# Patient Record
Sex: MALE | Race: WHITE | Employment: OTHER | URBAN - METROPOLITAN AREA
[De-identification: names, ages, dates, MRNs, and addresses within clinical notes are randomized per-mention and may not be internally consistent; named-entity substitution may affect disease eponyms.]

---

## 2018-05-01 ENCOUNTER — HOSPITAL ENCOUNTER (EMERGENCY)
Age: 68
Discharge: HOME | End: 2018-05-01
Attending: EMERGENCY MEDICINE
Payer: MEDICARE

## 2018-05-01 ENCOUNTER — APPOINTMENT (OUTPATIENT)
Dept: GENERAL RADIOLOGY | Age: 68
End: 2018-05-01
Attending: EMERGENCY MEDICINE
Payer: MEDICARE

## 2018-05-01 ENCOUNTER — HOSPITAL ENCOUNTER (EMERGENCY)
Age: 68
Discharge: STILL ADMITTED OR ADMINISTRATIVE | End: 2018-05-01
Attending: EMERGENCY MEDICINE

## 2018-05-01 VITALS
WEIGHT: 257 LBS | SYSTOLIC BLOOD PRESSURE: 110 MMHG | RESPIRATION RATE: 13 BRPM | HEART RATE: 75 BPM | OXYGEN SATURATION: 95 % | TEMPERATURE: 98 F | HEIGHT: 70 IN | DIASTOLIC BLOOD PRESSURE: 99 MMHG | BODY MASS INDEX: 36.79 KG/M2

## 2018-05-01 DIAGNOSIS — R07.9 CHEST PAIN, UNSPECIFIED TYPE: Primary | ICD-10-CM

## 2018-05-01 DIAGNOSIS — I48.0 PAROXYSMAL ATRIAL FIBRILLATION (HCC): ICD-10-CM

## 2018-05-01 LAB
ANION GAP SERPL CALC-SCNC: 12 MMOL/L (ref 5–15)
ATRIAL RATE: 109 BPM
ATRIAL RATE: 71 BPM
BASOPHILS # BLD: 0 K/UL (ref 0–0.1)
BASOPHILS NFR BLD: 0 % (ref 0–1)
BUN SERPL-MCNC: 19 MG/DL (ref 6–20)
BUN/CREAT SERPL: 14 (ref 12–20)
CALCIUM SERPL-MCNC: 8.8 MG/DL (ref 8.5–10.1)
CALCULATED P AXIS, ECG09: 37 DEGREES
CALCULATED R AXIS, ECG10: 30 DEGREES
CALCULATED R AXIS, ECG10: 42 DEGREES
CALCULATED T AXIS, ECG11: 26 DEGREES
CALCULATED T AXIS, ECG11: 46 DEGREES
CHLORIDE SERPL-SCNC: 106 MMOL/L (ref 97–108)
CO2 SERPL-SCNC: 24 MMOL/L (ref 21–32)
CREAT SERPL-MCNC: 1.39 MG/DL (ref 0.7–1.3)
DIAGNOSIS, 93000: NORMAL
DIAGNOSIS, 93000: NORMAL
DIFFERENTIAL METHOD BLD: ABNORMAL
EOSINOPHIL # BLD: 0 K/UL (ref 0–0.4)
EOSINOPHIL NFR BLD: 0 % (ref 0–7)
ERYTHROCYTE [DISTWIDTH] IN BLOOD BY AUTOMATED COUNT: 14.7 % (ref 11.5–14.5)
GLUCOSE SERPL-MCNC: 108 MG/DL (ref 65–100)
HCT VFR BLD AUTO: 38.4 % (ref 36.6–50.3)
HEMOCCULT STL QL: NEGATIVE
HGB BLD-MCNC: 12.9 G/DL (ref 12.1–17)
IMM GRANULOCYTES # BLD: 0 K/UL (ref 0–0.04)
IMM GRANULOCYTES NFR BLD AUTO: 0 % (ref 0–0.5)
LYMPHOCYTES # BLD: 0.9 K/UL (ref 0.8–3.5)
LYMPHOCYTES NFR BLD: 15 % (ref 12–49)
MAGNESIUM SERPL-MCNC: 1.9 MG/DL (ref 1.6–2.4)
MCH RBC QN AUTO: 31.1 PG (ref 26–34)
MCHC RBC AUTO-ENTMCNC: 33.6 G/DL (ref 30–36.5)
MCV RBC AUTO: 92.5 FL (ref 80–99)
MONOCYTES # BLD: 0.5 K/UL (ref 0–1)
MONOCYTES NFR BLD: 8 % (ref 5–13)
NEUTS SEG # BLD: 4.9 K/UL (ref 1.8–8)
NEUTS SEG NFR BLD: 77 % (ref 32–75)
NRBC # BLD: 0 K/UL (ref 0–0.01)
NRBC BLD-RTO: 0 PER 100 WBC
P-R INTERVAL, ECG05: 150 MS
PLATELET # BLD AUTO: 167 K/UL (ref 150–400)
PMV BLD AUTO: 11.1 FL (ref 8.9–12.9)
POTASSIUM SERPL-SCNC: 3.9 MMOL/L (ref 3.5–5.1)
Q-T INTERVAL, ECG07: 342 MS
Q-T INTERVAL, ECG07: 386 MS
QRS DURATION, ECG06: 76 MS
QRS DURATION, ECG06: 78 MS
QTC CALCULATION (BEZET), ECG08: 419 MS
QTC CALCULATION (BEZET), ECG08: 460 MS
RBC # BLD AUTO: 4.15 M/UL (ref 4.1–5.7)
SODIUM SERPL-SCNC: 142 MMOL/L (ref 136–145)
TROPONIN I BLD-MCNC: <0.04 NG/ML (ref 0–0.08)
TROPONIN I BLD-MCNC: <0.04 NG/ML (ref 0–0.08)
TSH SERPL DL<=0.05 MIU/L-ACNC: 1.25 UIU/ML (ref 0.36–3.74)
VENTRICULAR RATE, ECG03: 109 BPM
VENTRICULAR RATE, ECG03: 71 BPM
WBC # BLD AUTO: 6.3 K/UL (ref 4.1–11.1)

## 2018-05-01 PROCEDURE — 99285 EMERGENCY DEPT VISIT HI MDM: CPT

## 2018-05-01 PROCEDURE — 74011250636 HC RX REV CODE- 250/636: Performed by: EMERGENCY MEDICINE

## 2018-05-01 PROCEDURE — 82272 OCCULT BLD FECES 1-3 TESTS: CPT | Performed by: EMERGENCY MEDICINE

## 2018-05-01 PROCEDURE — 85025 COMPLETE CBC W/AUTO DIFF WBC: CPT | Performed by: EMERGENCY MEDICINE

## 2018-05-01 PROCEDURE — 96372 THER/PROPH/DIAG INJ SC/IM: CPT

## 2018-05-01 PROCEDURE — 83735 ASSAY OF MAGNESIUM: CPT | Performed by: EMERGENCY MEDICINE

## 2018-05-01 PROCEDURE — 84443 ASSAY THYROID STIM HORMONE: CPT | Performed by: EMERGENCY MEDICINE

## 2018-05-01 PROCEDURE — 36415 COLL VENOUS BLD VENIPUNCTURE: CPT | Performed by: EMERGENCY MEDICINE

## 2018-05-01 PROCEDURE — 71045 X-RAY EXAM CHEST 1 VIEW: CPT

## 2018-05-01 PROCEDURE — 93005 ELECTROCARDIOGRAM TRACING: CPT

## 2018-05-01 PROCEDURE — 84484 ASSAY OF TROPONIN QUANT: CPT

## 2018-05-01 PROCEDURE — 80048 BASIC METABOLIC PNL TOTAL CA: CPT | Performed by: EMERGENCY MEDICINE

## 2018-05-01 RX ORDER — ESMOLOL HYDROCHLORIDE 10 MG/ML
0-200 INJECTION, SOLUTION INTRAVENOUS
Status: DISCONTINUED | OUTPATIENT
Start: 2018-05-01 | End: 2018-05-01 | Stop reason: HOSPADM

## 2018-05-01 RX ORDER — METFORMIN HYDROCHLORIDE 1000 MG/1
1500 TABLET, FILM COATED, EXTENDED RELEASE ORAL 2 TIMES DAILY
COMMUNITY
End: 2020-06-16

## 2018-05-01 RX ORDER — ENOXAPARIN SODIUM 100 MG/ML
1 INJECTION SUBCUTANEOUS
Status: COMPLETED | OUTPATIENT
Start: 2018-05-01 | End: 2018-05-01

## 2018-05-01 RX ADMIN — ENOXAPARIN SODIUM 120 MG: 60 INJECTION SUBCUTANEOUS at 14:48

## 2018-05-01 NOTE — ED TRIAGE NOTES
Pt having chest pain that began during golf. EKG has been done, given to MD. Pt has no hx of irregular heart beat.

## 2018-05-01 NOTE — DISCHARGE INSTRUCTIONS
Atrial Fibrillation: Care Instructions  Your Care Instructions    Atrial fibrillation is an irregular and often fast heartbeat. Treating this condition is important for several reasons. It can cause blood clots, which can travel from your heart to your brain and cause a stroke. If you have a fast heartbeat, you may feel lightheaded, dizzy, and weak. An irregular heartbeat can also increase your risk for heart failure. Atrial fibrillation is often the result of another heart condition, such as high blood pressure or coronary artery disease. Making changes to improve your heart condition will help you stay healthy and active. Follow-up care is a key part of your treatment and safety. Be sure to make and go to all appointments, and call your doctor if you are having problems. It's also a good idea to know your test results and keep a list of the medicines you take. How can you care for yourself at home? Medicines  ? · Take your medicines exactly as prescribed. Call your doctor if you think you are having a problem with your medicine. You will get more details on the specific medicines your doctor prescribes. ? · If your doctor has given you a blood thinner to prevent a stroke, be sure you get instructions about how to take your medicine safely. Blood thinners can cause serious bleeding problems. ? · Do not take any vitamins, over-the-counter drugs, or herbal products without talking to your doctor first.   ? Lifestyle changes  ? · Do not smoke. Smoking can increase your chance of a stroke and heart attack. If you need help quitting, talk to your doctor about stop-smoking programs and medicines. These can increase your chances of quitting for good. ? · Eat a heart-healthy diet. ? · Stay at a healthy weight. Lose weight if you need to.   ? · Limit alcohol to 2 drinks a day for men and 1 drink a day for women. Too much alcohol can cause health problems. ? · Avoid colds and flu.  Get a pneumococcal vaccine shot. If you have had one before, ask your doctor whether you need another dose. Get a flu shot every year. If you must be around people with colds or flu, wash your hands often. Activity  ? · If your doctor recommends it, get more exercise. Walking is a good choice. Bit by bit, increase the amount you walk every day. Try for at least 30 minutes on most days of the week. You also may want to swim, bike, or do other activities. Your doctor may suggest that you join a cardiac rehabilitation program so that you can have help increasing your physical activity safely. ? · Start light exercise if your doctor says it is okay. Even a small amount will help you get stronger, have more energy, and manage stress. Walking is an easy way to get exercise. Start out by walking a little more than you did in the hospital. Gradually increase the amount you walk. ? · When you exercise, watch for signs that your heart is working too hard. You are pushing too hard if you cannot talk while you are exercising. If you become short of breath or dizzy or have chest pain, sit down and rest immediately. ? · Check your pulse regularly. Place two fingers on the artery at the palm side of your wrist, in line with your thumb. If your heartbeat seems uneven or fast, talk to your doctor. When should you call for help? Call 911 anytime you think you may need emergency care. For example, call if:  ? · You have symptoms of a heart attack. These may include:  ¨ Chest pain or pressure, or a strange feeling in the chest.  ¨ Sweating. ¨ Shortness of breath. ¨ Nausea or vomiting. ¨ Pain, pressure, or a strange feeling in the back, neck, jaw, or upper belly or in one or both shoulders or arms. ¨ Lightheadedness or sudden weakness. ¨ A fast or irregular heartbeat. After you call 911, the  may tell you to chew 1 adult-strength or 2 to 4 low-dose aspirin. Wait for an ambulance. Do not try to drive yourself.    ? · You have symptoms of a stroke. These may include:  ¨ Sudden numbness, tingling, weakness, or loss of movement in your face, arm, or leg, especially on only one side of your body. ¨ Sudden vision changes. ¨ Sudden trouble speaking. ¨ Sudden confusion or trouble understanding simple statements. ¨ Sudden problems with walking or balance. ¨ A sudden, severe headache that is different from past headaches. ? · You passed out (lost consciousness). ?Call your doctor now or seek immediate medical care if:  ? · You have new or increased shortness of breath. ? · You feel dizzy or lightheaded, or you feel like you may faint. ? · Your heart rate becomes irregular. ? · You can feel your heart flutter in your chest or skip heartbeats. Tell your doctor if these symptoms are new or worse. ? Watch closely for changes in your health, and be sure to contact your doctor if you have any problems. Where can you learn more? Go to http://kris-noman.info/. Enter U020 in the search box to learn more about \"Atrial Fibrillation: Care Instructions. \"  Current as of: September 21, 2016  Content Version: 11.4  © 3299-4784 [employer]. Care instructions adapted under license by [employer] (which disclaims liability or warranty for this information). If you have questions about a medical condition or this instruction, always ask your healthcare professional. [address] any warranty or liability for your use of this information. Chest Pain: Care Instructions  Your Care Instructions    There are many things that can cause chest pain. Some are not serious and will get better on their own in a few days. But some kinds of chest pain need more testing and treatment. Your doctor may have recommended a follow-up visit in the next 8 to 12 hours. If you are not getting better, you may need more tests or treatment.   Even though your doctor has released you, you still need to watch for any problems. The doctor carefully checked you, but sometimes problems can develop later. If you have new symptoms or if your symptoms do not get better, get medical care right away. If you have worse or different chest pain or pressure that lasts more than 5 minutes or you passed out (lost consciousness), call 911 or seek other emergency help right away. A medical visit is only one step in your treatment. Even if you feel better, you still need to do what your doctor recommends, such as going to all suggested follow-up appointments and taking medicines exactly as directed. This will help you recover and help prevent future problems. How can you care for yourself at home? · Rest until you feel better. · Take your medicine exactly as prescribed. Call your doctor if you think you are having a problem with your medicine. · Do not drive after taking a prescription pain medicine. When should you call for help? Call 911 if:  ? · You passed out (lost consciousness). ? · You have severe difficulty breathing. ? · You have symptoms of a heart attack. These may include:  ¨ Chest pain or pressure, or a strange feeling in your chest.  ¨ Sweating. ¨ Shortness of breath. ¨ Nausea or vomiting. ¨ Pain, pressure, or a strange feeling in your back, neck, jaw, or upper belly or in one or both shoulders or arms. ¨ Lightheadedness or sudden weakness. ¨ A fast or irregular heartbeat. After you call 911, the  may tell you to chew 1 adult-strength or 2 to 4 low-dose aspirin. Wait for an ambulance. Do not try to drive yourself. ?Call your doctor today if:  ? · You have any trouble breathing. ? · Your chest pain gets worse. ? · You are dizzy or lightheaded, or you feel like you may faint. ? · You are not getting better as expected. ? · You are having new or different chest pain. Where can you learn more? Go to http://kris-noman.info/.   Enter A120 in the search box to learn more about \"Chest Pain: Care Instructions. \"  Current as of: March 20, 2017  Content Version: 11.4  © 3616-9186 [employer], [employer]. Care instructions adapted under license by [employer] (which disclaims liability or warranty for this information). If you have questions about a medical condition or this instruction, always ask your healthcare professional. [place] [address] any warranty or liability for your use of this information.

## 2018-05-01 NOTE — ED PROVIDER NOTES
HPI Comments: 76 y.o. male with past medical history significant for DM, HTN, high cholesterol, hernia, sleep apnea, BPH, and obesity who presents from home with chief complaint of chest pain. Pt was playing golf this morning when he had a sudden onset of tightness across his chest lasting for ~2 hours. His pain resolved after taking \"9 Tums\" and he then began experiencing epigastric pain. He experienced some associated diaphoresis and dizziness at that time as well. Once home, he took his BP which was 82/64. His symptoms have now all resolved and he just feels fatigued. He states that he has also noticed some recent dark stools. Pt takes Amlodipine and recently decreased his dose from 2x/day to one pill/day. No hx of a-fib. No daily use of blood thinners. Pt denies nausea, SOB, leg swelling, abd pain, N/V/D, or changes in BM/urine. There are no other acute medical concerns at this time. Social hx: current some day tobacco smoker; (+) EtOH use (3 drinks/night)  PCP: Shawna Cárdenas MD    Note written by Travis Pride, as dictated by Anne Plascencia. Rio Means MD 1:02 PM    The history is provided by the patient. No  was used. Past Medical History:   Diagnosis Date    BPH (benign prostatic hyperplasia)     Diabetes mellitus     Hernia     High cholesterol     Hypertension     Obesity     Sleep apnea        Past Surgical History:   Procedure Laterality Date    RECTUM SURGERY PROCEDURE UNLISTED           Family History:   Problem Relation Age of Onset    Stroke Mother        Social History     Social History    Marital status:      Spouse name: N/A    Number of children: N/A    Years of education: N/A     Occupational History    Not on file.      Social History Main Topics    Smoking status: Current Some Day Smoker    Smokeless tobacco: Not on file    Alcohol use Yes      Comment: 3 drinks per night    Drug use: No    Sexual activity: Not on file     Other Topics Concern    Not on file     Social History Narrative    No narrative on file         ALLERGIES: Review of patient's allergies indicates no known allergies. Review of Systems   Constitutional: Positive for diaphoresis (resolved). Negative for chills and fever. HENT: Negative for ear pain and sore throat. Eyes: Negative for pain. Respiratory: Negative for chest tightness and shortness of breath. Cardiovascular: Positive for chest pain (resolved). Negative for leg swelling. Gastrointestinal: Negative for abdominal pain, diarrhea, nausea and vomiting. Genitourinary: Negative for dysuria and flank pain. Musculoskeletal: Negative for back pain. Skin: Negative for rash. Neurological: Positive for dizziness (resolved). Negative for headaches. All other systems reviewed and are negative. Vitals:    05/01/18 1515 05/01/18 1530 05/01/18 1545 05/01/18 1615   BP: 134/68 133/78 139/77 (!) 110/99   Pulse: 69 66 66 75   Resp: 16 16 17 13   Temp:       SpO2: 92% 94% 94% 95%   Weight:       Height:                Physical Exam   Constitutional: He appears well-developed and well-nourished. No distress. HENT:   Head: Normocephalic and atraumatic. Eyes: Conjunctivae are normal. Pupils are equal, round, and reactive to light. No scleral icterus. Pupils are equal. Conjunctiva normal.    Neck: Normal range of motion. Neck supple. No tracheal deviation present. Cardiovascular: Normal heart sounds and intact distal pulses. An irregularly irregular rhythm present. Tachycardia present. Exam reveals no gallop and no friction rub. No murmur heard. Radial pulses are 2+ and equal.    Pulmonary/Chest: Effort normal and breath sounds normal. No respiratory distress. He has no wheezes. He has no rales. Lungs clear. Abdominal: Soft. He exhibits no distension. There is no tenderness. There is no rebound and no guarding. Musculoskeletal: He exhibits no edema. No pedal edema.     Neurological: He is alert. Skin: Skin is warm and dry. Psychiatric: He has a normal mood and affect. Nursing note and vitals reviewed. Note written by Travis Anderson, as dictated by Abhishek Rivera. Misbah Laird MD 1:01 PM    MDM  Number of Diagnoses or Management Options  Chest pain, unspecified type:   Paroxysmal atrial fibrillation [facility]):   Diagnosis management comments: 75 yo male with rapid afib and chest pain. Converted with meds. Given lovenox as he has multiple risk factors. Chads2 score of 2    Plan: eliquis, f/u with cardiology, Return to the emergency department for new/ worsening symptoms or other concerns     Abhishek Rivera. Misbah Laird MD          ED Course       Procedures    ED EKG interpretation #1:  Rhythm: atrial fib and with rapid ventricular response; and irregular. Rate (approx.): 113; ST/T wave: nonspecific T wave abnormality. Note written by Travis Anderson, as dictated by Abhishek Rivera. Misbah Laird MD 12:31 PM    CONSULT NOTE:  2:53 PM Abhishek Rivera. Misbah Laird MD spoke with Dr. Augusta Gannon, Consult for Cardiology. Discussed available diagnostic tests and clinical findings. Dr. Augusta Gannon agrees with starting the pt on Eliquis and will f/u with him in his office on Thursday. Repeat ED EKG interpretation:  Rhythm: normal sinus rhythm; and regular . Rate (approx.): 71; Axis: normal; ST/T wave: normal.  Note written by Travis Anderson, as dictated by Abhishek Rivera.  Misbah Laird MD 1:50 AM

## 2018-05-03 ENCOUNTER — OFFICE VISIT (OUTPATIENT)
Dept: CARDIOLOGY CLINIC | Age: 68
End: 2018-05-03

## 2018-05-03 VITALS
DIASTOLIC BLOOD PRESSURE: 62 MMHG | HEIGHT: 70 IN | SYSTOLIC BLOOD PRESSURE: 150 MMHG | HEART RATE: 68 BPM | WEIGHT: 253 LBS | BODY MASS INDEX: 36.22 KG/M2

## 2018-05-03 DIAGNOSIS — D50.9 IRON DEFICIENCY ANEMIA, UNSPECIFIED IRON DEFICIENCY ANEMIA TYPE: ICD-10-CM

## 2018-05-03 DIAGNOSIS — I48.0 PAROXYSMAL ATRIAL FIBRILLATION (HCC): Primary | ICD-10-CM

## 2018-05-03 DIAGNOSIS — R07.9 CHEST PAIN, UNSPECIFIED TYPE: ICD-10-CM

## 2018-05-03 RX ORDER — DILTIAZEM HYDROCHLORIDE 180 MG/1
180 CAPSULE, COATED, EXTENDED RELEASE ORAL DAILY
Qty: 30 CAP | Refills: 12 | Status: SHIPPED | OUTPATIENT
Start: 2018-05-03 | End: 2018-05-03 | Stop reason: SDUPTHER

## 2018-05-03 RX ORDER — ASCORBIC ACID 500 MG
TABLET ORAL
COMMUNITY

## 2018-05-03 RX ORDER — TIOTROPIUM BROMIDE 18 UG/1
1 CAPSULE ORAL; RESPIRATORY (INHALATION) DAILY
COMMUNITY
End: 2018-12-10

## 2018-05-03 RX ORDER — ALBUTEROL SULFATE 90 UG/1
AEROSOL, METERED RESPIRATORY (INHALATION) AS NEEDED
COMMUNITY

## 2018-05-03 RX ORDER — RANITIDINE HCL 150 MG
150 TABLET ORAL 2 TIMES DAILY
COMMUNITY
End: 2020-06-16

## 2018-05-03 RX ORDER — ERGOCALCIFEROL 1.25 MG/1
50000 CAPSULE ORAL
COMMUNITY

## 2018-05-03 RX ORDER — GLUCOSAMINE/CHONDR SU A SOD 167-133 MG
500 CAPSULE ORAL
COMMUNITY
End: 2020-06-16

## 2018-05-03 RX ORDER — SERTRALINE HYDROCHLORIDE 100 MG/1
100 TABLET, FILM COATED ORAL DAILY
COMMUNITY

## 2018-05-03 RX ORDER — MONTELUKAST SODIUM 10 MG/1
10 TABLET ORAL DAILY
COMMUNITY

## 2018-05-03 RX ORDER — LANOLIN ALCOHOL/MO/W.PET/CERES
1000 CREAM (GRAM) TOPICAL
COMMUNITY

## 2018-05-03 RX ORDER — LOSARTAN POTASSIUM 100 MG/1
100 TABLET ORAL DAILY
COMMUNITY
End: 2018-06-26

## 2018-05-03 RX ORDER — AMLODIPINE BESYLATE 5 MG/1
5 TABLET ORAL DAILY
COMMUNITY
End: 2018-05-03

## 2018-05-03 NOTE — MR AVS SNAPSHOT
[facility] 
[phone] Patient: Giselle Hilliard MRN: [id number] VFO:[birth year] Visit Information Date & Time Provider Department Dept. Phone Encounter #  
 5/3/2018 10:00 AM Shashi Colin MD [facility] [phone] [id number] Your Appointments 5/21/2018  9:00 AM  
ECHO CARDIOGRAMS 2D with ECHO STGUILLERMINA  
[facility] ([facility] SCHEDULING) Appt Note: echo at 9am per dr Quintin Cohen dx cp 2 day s-card  ht 5'10 wt 512 [facility] [address] [place] [address]  
[phone]  
  
   
 [address] [address]  
  
    
 5/21/2018 10:00 AM  
NUCLEAR MEDICINE with NUCLEAR, JOSE MANUEL  
[facility] ([address]) Appt Note: echo at 9am per dr Quintin Cohen dx cp 2 day s-card  ht 5'10 wt 512 [facility] 600 [facility] [address]  
[phone]  
  
   
 [address] [address]  
  
    
 6/26/2018 10:00 AM  
ESTABLISHED PATIENT with Shashi Colin MD  
[facility] ([address]) Appt Note: 6 mo fu  
 [address]  
[address] [facility] [address] [address] Upcoming Health Maintenance Date Due Hepatitis C Screening 1950 HEMOGLOBIN A1C Q6M 1950 FOOT EXAM Q1 3/2/1960 MICROALBUMIN Q1 3/2/1960 EYE EXAM RETINAL OR DILATED Q1 3/2/1960 DTaP/Tdap/Td series (1 - Tdap) 3/2/1971 ZOSTER VACCINE AGE 60> 1/2/2010 LIPID PANEL Q1 12/12/2012 GLAUCOMA SCREENING Q2Y 3/2/2015 Pneumococcal 65+ Low/Medium Risk (1 of 2 - PCV13) 3/2/2015 MEDICARE YEARLY EXAM 5/1/2018 Influenza Age 5 to Adult 8/1/2018 FOBT Q 1 YEAR AGE 50-75 5/1/2019 Allergies as of 5/3/2018  Review Complete On: 5/3/2018 By: Master Jacome MD  
 No Known Allergies Current Immunizations  Never Reviewed No immunizations on file. Not reviewed this visit You Were Diagnosed With   
  
 Codes Comments Paroxysmal atrial fibrillation (HCC)    -  Primary ICD-10-CM: I48.0 ICD-9-CM: 427.31 Iron deficiency anemia, unspecified iron deficiency anemia type     ICD-10-CM: D50.9 ICD-9-CM: 280. 9 Chest pain, unspecified type     ICD-10-CM: R07.9 ICD-9-CM: 786.50 Vitals BP Pulse Height(growth percentile) Weight(growth percentile) BMI Smoking Status 150/62 68 5' 10\" (1.778 m) 253 lb (114.8 kg) 36.3 kg/m2 Former Smoker Vitals History BMI and BSA Data Body Mass Index Body Surface Area  
 36.3 kg/m 2 2.38 m 2 Preferred Pharmacy Pharmacy Name Phone [address] [phone] Your Updated Medication List  
  
   
This list is accurate as of 5/3/18 10:46 AM.  Always use your most recent med list.  
  
  
  
  
 ALLER-BOSTON PO Take  by mouth. apixaban 5 mg tablet Commonly known as:  Verlee Infield Take 1 Tab by mouth two (2) times a day for 30 days. CRESTOR 40 mg tablet Generic drug:  rosuvastatin Take 40 mg by mouth daily. NON FORMULARY  
  
 cyanocobalamin 1,000 mcg tablet Take 1,000 mcg by mouth daily. dilTIAZem  mg ER capsule Commonly known as:  CARDIZEM CD Take 1 Cap by mouth daily. ergocalciferol 50,000 unit capsule Commonly known as:  ERGOCALCIFEROL Take 50,000 Units by mouth. FISH OIL 1,000 mg Cap Generic drug:  omega-3 fatty acids-vitamin e Take 1 Cap by mouth daily. losartan 100 mg tablet Commonly known as:  COZAAR Take 100 mg by mouth daily. metFORMIN 1,000 mg Tg24 24 hour tablet Commonly known as:  Leeta Malcolm Take 2,000 mg by mouth.  
  
 montelukast 10 mg tablet Commonly known as:  SINGULAIR  
 Take 10 mg by mouth daily. nicotinic acid 500 mg tablet Commonly known as:  NIACIN Take 500 mg by mouth Daily (before breakfast). NOVOLIN N SC  
25 Units by SubCUTAneous route two (2) times a day. omeprazole 40 mg capsule Commonly known as:  PRILOSEC Take 40 mg by mouth daily. POLY-IRON PO Take  by mouth. PROAIR HFA 90 mcg/actuation inhaler Generic drug:  albuterol Take  by inhalation. sertraline 100 mg tablet Commonly known as:  ZOLOFT Take 50 mg by mouth daily. SPIRIVA WITH HANDIHALER 18 mcg inhalation capsule Generic drug:  tiotropium Take 1 Cap by inhalation daily. tamsulosin 0.4 mg capsule Commonly known as:  FLOMAX Take 0.4 mg by mouth two (2) times a day. VITAMIN C 500 mg tablet Generic drug:  ascorbic acid (vitamin C) Take  by mouth. ZANTAC 150 mg tablet Generic drug:  raNITIdine Take 150 mg by mouth two (2) times a day. Prescriptions Sent to Pharmacy Refills  
 dilTIAZem CD (CARDIZEM CD) 180 mg ER capsule 12 Sig: Take 1 Cap by mouth daily. Class: Normal  
 Pharmacy: [facility] [address] [place] [address] [address] AT [address] Ph #: [phone] Route: Oral  
 apixaban (ELIQUIS) 5 mg tablet 3 Sig: Take 1 Tab by mouth two (2) times a day for 30 days. Class: Normal  
 Pharmacy: [address] [place] [address], [address] [place] [address] Ph #: [phone] Route: Oral  
  
Introducing [facility]! [employer] introduces TxCell patient portal. Now you can access parts of your medical record, email your doctor's office, and request medication refills online. 1. In your internet browser, go to [email or web address] 2. Click on the First Time User? Click Here link in the Sign In box. You will see the New Member Sign Up page. 3. Enter your TxCell Access Code exactly as it appears below.  You will not need to use this code after youve completed the sign-up process. If you do not sign up before the expiration date, you must request a new code. · [employer] Access Code: [email or web address] Expires: 7/30/2018 12:38 PM 
 
4. Enter the last four digits of your Social Security Number (xxxx) and Date of Birth (mm/dd/yyyy) as indicated and click Submit. You will be taken to the next sign-up page. 5. Create a [employer] ID. This will be your [employer] login ID and cannot be changed, so think of one that is secure and easy to remember. 6. Create a [employer] password. You can change your password at any time. 7. Enter your Password Reset Question and Answer. This can be used at a later time if you forget your password. 8. Enter your e-mail address. You will receive e-mail notification when new information is available in [address]. 9. Click Sign Up. You can now view and download portions of your medical record. 10. Click the Download Summary menu link to download a portable copy of your medical information. If you have questions, please visit the Frequently Asked Questions section of the [employer] website. Remember, [employer] is NOT to be used for urgent needs. For medical emergencies, dial 911. Now available from your iPhone and Android! Please provide this summary of care documentation to your next provider. Your primary care clinician is listed as [employer]. If you have any questions after today's visit, please call [phone].

## 2018-05-03 NOTE — PROGRESS NOTES
Tracee Gilliam MD. [facility]              Patient: Delmy Thomas  : 1950      Today's Date: 5/3/2018          HISTORY OF PRESENT ILLNESS:     History of Present Illness:  Mr. Angelica Tyler is referred for chest pain from ER. Dr. Santiago Doctor noted \"74 y.o. male with past medical history significant for DM, HTN, high cholesterol, hernia, sleep apnea, BPH, and obesity who presents from home with chief complaint of chest pain. Pt was playing golf this morning when he had a sudden onset of tightness across his chest lasting for ~2 hours. His pain resolved after taking \"9 Tums\" and he then began experiencing epigastric pain. He experienced some associated diaphoresis and dizziness at that time as well. Once home, he took his BP which was 82/64. His symptoms have now all resolved and he just feels fatigued. He states that he has also noticed some recent dark stools. Pt takes Amlodipine and recently decreased his dose from 2x/day to one pill/day\"    He says this has happened a few times - plays golf and has chest pain - he takes Tums and usually better - but that one day it persisted. Dizzy. No sig SOB or radiation.     EKG 18 in ER showed afib on arrival and was then back in NSR spontaneously,             PAST MEDICAL HISTORY:     Past Medical History:   Diagnosis Date    Atrial fibrillation ([address]     Afib 18 - spont converted to NSR    BPH (benign prostatic hyperplasia)     COPD (chronic obstructive pulmonary disease) (HCC)     mild COPD - 30 pack year smoking     Diabetes mellitus     Hernia of unspecified site of abdominal cavity without mention of obstruction or gangrene     High cholesterol     Hypertension     Iron deficiency anemia     JW visit     Obesity     Sleep apnea     uses CPAP       Past Surgical History:   Procedure Laterality Date    RECTUM SURGERY PROCEDURE UNLISTED             MEDICATIONS:     Current Outpatient Prescriptions   Medication Sig Dispense Refill    losartan (COZAAR) 100 mg tablet Take 100 mg by mouth daily.  sertraline (ZOLOFT) 100 mg tablet Take 50 mg by mouth daily.  cetirizine HCl (ALLER-BOSTON PO) Take  by mouth.  montelukast (SINGULAIR) 10 mg tablet Take 10 mg by mouth daily.  amLODIPine (NORVASC) 5 mg tablet Take 5 mg by mouth daily.  tiotropium (SPIRIVA WITH HANDIHALER) 18 mcg inhalation capsule Take 1 Cap by inhalation daily.  nicotinic acid (NIACIN) 500 mg tablet Take 500 mg by mouth Daily (before breakfast).  raNITIdine (ZANTAC) 150 mg tablet Take 150 mg by mouth two (2) times a day.  iron polysaccharide complex (POLY-IRON PO) Take  by mouth.  insulin NPH human isophane (NOVOLIN N SC) 25 Units by SubCUTAneous route two (2) times a day.  ascorbic acid, vitamin C, (VITAMIN C) 500 mg tablet Take  by mouth.  ergocalciferol (ERGOCALCIFEROL) 50,000 unit capsule Take 50,000 Units by mouth.  cyanocobalamin 1,000 mcg tablet Take 1,000 mcg by mouth daily.  albuterol (PROAIR HFA) 90 mcg/actuation inhaler Take  by inhalation.  metFORMIN (GLUMETZA) 1,000 mg TG24 24 hour tablet Take 2,000 mg by mouth.  apixaban (ELIQUIS) 5 mg tablet Take 1 Tab by mouth two (2) times a day for 30 days. 60 Tab 0    omeprazole (PRILOSEC) 40 mg capsule Take 40 mg by mouth daily.  tamsulosin (FLOMAX) 0.4 mg capsule Take 0.4 mg by mouth two (2) times a day.  rosuvastatin (CRESTOR) 40 mg tablet Take 40 mg by mouth daily. NON FORMULARY       omega-3 fatty acids-vitamin e (FISH OIL) 1,000 mg Cap Take 1 Cap by mouth daily.          No Known Allergies          SOCIAL HISTORY:     Social History   Substance Use Topics    Smoking status: Former Smoker     Quit date: 6/3/1990    Smokeless tobacco: Never Used    Alcohol use Yes      Comment: 3 drinks per night         FAMILY HISTORY:     Family History   Problem Relation Age of Onset    Stroke Mother              REVIEW OF SYMPTOMS:     Review of Symptoms:  Constitutional: Negative for fever, chills  HEENT: Negative for nosebleeds, tinnitus, and vision changes. Respiratory: Negative for cough, wheezing  +AUSTIN  Cardiovascular: Negative for orthopnea, claudication, syncope, and PND. Gastrointestinal: Negative for abdominal pain, diarrhea, melena. Genitourinary: Negative for dysuria  Musculoskeletal: Negative for myalgias. Skin: Negative for rash  Heme: No problems bleeding recently   Neurological: Negative for speech change and focal weakness. PHYSICAL EXAM:     Physical Exam:  Visit Vitals    /62    Pulse 68    Ht 5' 10\" (1.778 m)    Wt 253 lb (114.8 kg)    BMI 36.3 kg/m2     Patient appears generally well, mood and affect are appropriate and pleasant. HEENT:  Hearing intact, non-icteric, normocephalic, atraumatic. Neck Exam: Supple, No JVD or carotid bruits. Lung Exam: Clear to auscultation, even breath sounds. Cardiac Exam: Regular rate and rhythm with no murmur  Abdomen: Soft, non-tender, normal bowel sounds. No bruits or masses. Extremities: Moves all ext well. No lower extremity edema. Vascular: 2+ dorsalis pedis pulses bilaterally.   Psych: Appropriate affect  Neuro - Grossly intact        LABS / OTHER STUDIES:       Lab Results   Component Value Date/Time    Sodium 142 05/01/2018 12:58 PM    Potassium 3.9 05/01/2018 12:58 PM    Chloride 106 05/01/2018 12:58 PM    CO2 24 05/01/2018 12:58 PM    Anion gap 12 05/01/2018 12:58 PM    Glucose 108 (H) 05/01/2018 12:58 PM    BUN 19 05/01/2018 12:58 PM    Creatinine 1.39 (H) 05/01/2018 12:58 PM    BUN/Creatinine ratio 14 05/01/2018 12:58 PM    GFR est AA >60 05/01/2018 12:58 PM    GFR est non-AA 51 (L) 05/01/2018 12:58 PM    Calcium 8.8 05/01/2018 12:58 PM     Lab Results   Component Value Date/Time    WBC 6.3 05/01/2018 12:58 PM    HGB 12.9 05/01/2018 12:58 PM    HCT 38.4 05/01/2018 12:58 PM    PLATELET 176 75/37/3893 12:58 PM    MCV 92.5 05/01/2018 12:58 PM       Lab Results Component Value Date/Time    TSH 1.25 05/01/2018 12:59 PM             CARDIAC DIAGNOSTICS:     Cardiac Evaluation Includes:  Exercise cardiolite 2011 - 1. Objectively normal treadmill stress test (patient reached only 79% of  maximal predicted heart rate, thus decreasing the sensitivity of the test). His max workload was 9.1 METS. 2. Atypical, mild chest pain. 3. Normal exercise gated myocardial perfusion study without inducible  Ischemia. 4. Normal systolic function with an left ventricular ejection fraction of   77%.        EKG 5/1/18 - Afib with RVR,   EKG 5/1/18 - NSR, normal           ASSESSMENT AND PLAN:     Assessment and Plan:  1) Afib 5/1/18 on ER presentation - converted to NSR on his own  - [id number] score is 3 (has 5% stroke risk per year)   - switch norvasc to cardizem 180 mg    - had anemia in past - he says workup was OK years ago and has been on iron since then - will get prior records to review from Dr. Juares Los office   - Continue Eliquis 5 mg BID (reviewed risks/benefits)  - Check an echo     2) Chest pain   - symptoms worrisome for angina   - Check 2 day exercise cardiolite   - he has many CAD risk factors     3) HTN  - med change as above    4) Dyslipidemia  - on Crestor     5) Phone FU after testing. See me back in 6 weeks   Vacation next week to [place]. Is retired. Ksenia Holt MD, [address]  Ph: [phone]   Ph [phone]        ADDENDUM   5/4/2018  Labs 2/5/16 - Iron 52, ferritin 20, Hgb 12.8  Labs 2/27/18 - A1c 7.2,ferritin 29, iron 77, Hgb 13.4, plt 187, chol 140, , LDL 63, HDL 42, CMP OK,     Records from Dr. Lora Adair reviewed - her note states that patient had iron deficiency anemia - but had negative GI workup with Dr. Evangelina Ly and had multiple stools that were negative heme. ADDENDUM   5/23/2018  Echo 5/21/18 - LVEF 60%. RV normal.  Mild MR.  AV sclerosis     Lexiscan Cardiolite 5/21/18 - normal MPI, LVEF 68%. Will have nurse call with essentially normal results.

## 2018-05-21 ENCOUNTER — CLINICAL SUPPORT (OUTPATIENT)
Dept: CARDIOLOGY CLINIC | Age: 68
End: 2018-05-21

## 2018-05-21 DIAGNOSIS — R07.9 CHEST PAIN, UNSPECIFIED TYPE: Primary | ICD-10-CM

## 2018-05-21 DIAGNOSIS — I48.0 PAROXYSMAL ATRIAL FIBRILLATION (HCC): ICD-10-CM

## 2018-05-21 DIAGNOSIS — E78.5 HYPERLIPIDEMIA, UNSPECIFIED HYPERLIPIDEMIA TYPE: Chronic | ICD-10-CM

## 2018-05-21 DIAGNOSIS — I10 HYPERTENSION, UNSPECIFIED TYPE: Chronic | ICD-10-CM

## 2018-05-21 NOTE — PROGRESS NOTES
See scanned report. Dr. Boss Husbands ordered study and Dr. Boss Husbands read study. ID verified per protocol. Explained procedure to patient. Obtaining IV access, radiation exposure, risks and discomforts (for exercise- change to lexiwalk stress). , waiting between injection(s) and obtaining images. All concerns and questions addressed prior to obtaining consent test. Patient developed dizziness and dyspnea during test. O2 3L/M nc applied during sterssing portion and removed in recovery. At 4 minutes in recovery, patient without symptoms or complaints voiced.

## 2018-05-30 ENCOUNTER — TELEPHONE (OUTPATIENT)
Dept: CARDIOLOGY CLINIC | Age: 68
End: 2018-05-30

## 2018-05-30 NOTE — TELEPHONE ENCOUNTER
He was calling pleased his test results are normal but is still questioning where his chest pain may have come from.

## 2018-05-30 NOTE — TELEPHONE ENCOUNTER
----- Message from Celina Gee MD sent at 5/23/2018 10:55 PM EDT -----  Regarding: please call patient  Please call patient. Echo 5/21/18 - LVEF 60%. RV normal.  Mild MR.  AV sclerosis     Lexiscan Cardiolite 5/21/18 - normal MPI, LVEF 68%. Will have nurse call with essentially normal results.      Thanks,  Nando Marques

## 2018-05-31 NOTE — TELEPHONE ENCOUNTER
MD Edwige Zafar, RN       Caller: Unspecified (Yesterday, 12:23 PM)                     Called him - if chest pain worsens - then call me and we could workup further - see me in 6 weeks.

## 2018-06-07 ENCOUNTER — TELEPHONE (OUTPATIENT)
Dept: CARDIOLOGY CLINIC | Age: 68
End: 2018-06-07

## 2018-06-07 NOTE — TELEPHONE ENCOUNTER
Pt is calling in regards to his prescription that was sent to [facility] He can be reached @ [phone].      Thanks

## 2018-06-08 DIAGNOSIS — I48.0 PAROXYSMAL ATRIAL FIBRILLATION (HCC): Primary | ICD-10-CM

## 2018-06-08 NOTE — TELEPHONE ENCOUNTER
Talked to pt about an hour ago, going to [facility] for samples of eliquis.

## 2018-06-26 ENCOUNTER — OFFICE VISIT (OUTPATIENT)
Dept: CARDIOLOGY CLINIC | Age: 68
End: 2018-06-26

## 2018-06-26 VITALS
HEART RATE: 76 BPM | WEIGHT: 249.4 LBS | SYSTOLIC BLOOD PRESSURE: 142 MMHG | DIASTOLIC BLOOD PRESSURE: 76 MMHG | BODY MASS INDEX: 35.71 KG/M2 | HEIGHT: 70 IN

## 2018-06-26 DIAGNOSIS — I10 HYPERTENSION, UNSPECIFIED TYPE: Primary | Chronic | ICD-10-CM

## 2018-06-26 DIAGNOSIS — I48.0 PAROXYSMAL ATRIAL FIBRILLATION (HCC): ICD-10-CM

## 2018-06-26 DIAGNOSIS — E78.5 HYPERLIPIDEMIA, UNSPECIFIED HYPERLIPIDEMIA TYPE: Chronic | ICD-10-CM

## 2018-06-26 RX ORDER — AMLODIPINE BESYLATE 5 MG/1
5 TABLET ORAL DAILY
COMMUNITY
End: 2018-06-26

## 2018-06-26 RX ORDER — LOSARTAN POTASSIUM 50 MG/1
50 TABLET ORAL DAILY
Qty: 90 TAB | Refills: 3 | Status: SHIPPED | OUTPATIENT
Start: 2018-06-26 | End: 2019-01-10

## 2018-06-26 NOTE — MR AVS SNAPSHOT
[address] [facility] 600 70 [facility] 
[phone] Patient: Matthias Sheehan MRN: [id number] TWO:0/3/9932 Visit Information Date & Time Provider Department Dept. Phone Encounter #  
 6/26/2018 10:00 AM Isaiah Swain MD [facility] Jyoti Westfall [phone] [id number] Your Appointments 1/25/2019  9:40 AM  
ESTABLISHED PATIENT with Isaiah Swain MD  
[facility] Appt Note: 6 mo fu  
 [address] [facility] [address] [address] [address] Upcoming Health Maintenance Date Due Hepatitis C Screening 1950 FOOT EXAM Q1 3/2/1960 EYE EXAM RETINAL OR DILATED Q1 3/2/1960 DTaP/Tdap/Td series (1 - Tdap) 3/2/1971 ZOSTER VACCINE AGE 60> 1/2/2010 LIPID PANEL Q1 12/12/2012 GLAUCOMA SCREENING Q2Y 3/2/2015 Pneumococcal 65+ Low/Medium Risk (1 of 2 - PCV13) 3/2/2015 MEDICARE YEARLY EXAM 5/1/2018 Influenza Age 5 to Adult 8/1/2018 HEMOGLOBIN A1C Q6M 8/28/2018 MICROALBUMIN Q1 2/28/2019 FOBT Q 1 YEAR AGE 50-75 5/1/2019 Allergies as of 6/26/2018  Review Complete On: 6/26/2018 By: Isaiah Swain MD  
 No Known Allergies Current Immunizations  Never Reviewed No immunizations on file. Not reviewed this visit You Were Diagnosed With   
  
 Codes Comments Hypertension, unspecified type    -  Primary ICD-10-CM: I10 
ICD-9-CM: 401.9 Hyperlipidemia, unspecified hyperlipidemia type     ICD-10-CM: E78.5 ICD-9-CM: 272.4 Paroxysmal atrial fibrillation (HCC)     ICD-10-CM: I48.0 ICD-9-CM: 427.31 Vitals BP Pulse Height(growth percentile) Weight(growth percentile) BMI Smoking Status 142/76 (BP 1 Location: Left arm) 76 5' 10\" (1.778 m) 249 lb 6.4 oz (113.1 kg) 35.79 kg/m2 Former Smoker Vitals History BMI and BSA Data Body Mass Index Body Surface Area 35.79 kg/m 2 2.36 m 2 Preferred Pharmacy Pharmacy Name Phone [facility] [address] [place] [address] [address] [phone] Your Updated Medication List  
  
   
This list is accurate as of 6/26/18 10:44 AM.  Always use your most recent med list.  
  
  
  
  
 ALLER-BOSTON PO Take  by mouth two (2) times a day. apixaban 5 mg tablet Commonly known as:  Verl Nailer Take 1 Tab by mouth two (2) times a day for 30 days. CARTIA  mg ER capsule Generic drug:  dilTIAZem CD  
TAKE 1 CAPSULE BY MOUTH DAILY CRESTOR 40 mg tablet Generic drug:  rosuvastatin Take 40 mg by mouth daily. NON FORMULARY  
  
 cyanocobalamin 1,000 mcg tablet Take 1,000 mcg by mouth every seven (7) days. ergocalciferol 50,000 unit capsule Commonly known as:  ERGOCALCIFEROL Take 50,000 Units by mouth every seven (7) days. FISH OIL 1,000 mg Cap Generic drug:  omega-3 fatty acids-vitamin e Take 1 Cap by mouth daily. losartan 50 mg tablet Commonly known as:  COZAAR Take 1 Tab by mouth daily. metFORMIN 1,000 mg Tg24 24 hour tablet Commonly known as:  Torsten August Take 2,000 mg by mouth.  
  
 montelukast 10 mg tablet Commonly known as:  SINGULAIR Take 10 mg by mouth daily. nicotinic acid 500 mg tablet Commonly known as:  NIACIN Take 500 mg by mouth Daily (before breakfast). NOVOLIN N SC  
25 Units by SubCUTAneous route two (2) times a day. omeprazole 40 mg capsule Commonly known as:  PRILOSEC Take 40 mg by mouth two (2) times a day. POLY-IRON PO Take 150 mg by mouth two (2) times a day. PROAIR HFA 90 mcg/actuation inhaler Generic drug:  albuterol Take  by inhalation as needed. sertraline 100 mg tablet Commonly known as:  ZOLOFT Take 50 mg by mouth daily. SPIRIVA WITH HANDIHALER 18 mcg inhalation capsule Generic drug:  tiotropium Take 1 Cap by inhalation daily. tamsulosin 0.4 mg capsule Commonly known as:  FLOMAX Take 0.4 mg by mouth two (2) times a day. VITAMIN C 500 mg tablet Generic drug:  ascorbic acid (vitamin C) Take  by mouth every seven (7) days. ZANTAC 150 mg tablet Generic drug:  raNITIdine Take 150 mg by mouth two (2) times a day. Prescriptions Sent to Pharmacy Refills  
 losartan (COZAAR) 50 mg tablet 3 Sig: Take 1 Tab by mouth daily. Class: Normal  
 Pharmacy: [address] [place] [address] Ph #: [phone] Route: Oral  
 apixaban (ELIQUIS) 5 mg tablet 3 Sig: Take 1 Tab by mouth two (2) times a day for 30 days. Class: Normal  
 Pharmacy: [address] [place] [address] Ph #: [phone] Route: Oral  
  
Introducing [facility]! [employer] introduces [employer] patient portal. Now you can access parts of your medical record, email your doctor's office, and request medication refills online. 1. In your internet browser, go to [email or web address] 2. Click on the First Time User? Click Here link in the Sign In box. You will see the New Member Sign Up page. 3. Enter your [employer] Access Code exactly as it appears below. You will not need to use this code after youve completed the sign-up process. If you do not sign up before the expiration date, you must request a new code. · [employer] Access Code: [email or web address] Expires: 7/30/2018 12:38 PM 
 
4. Enter the last four digits of your Social Security Number (xxxx) and Date of Birth (mm/dd/yyyy) as indicated and click Submit. You will be taken to the next sign-up page. 5. Create a [employer] ID. This will be your [employer] login ID and cannot be changed, so think of one that is secure and easy to remember. 6. Create a [employer] password. You can change your password at any time. 7. Enter your Password Reset Question and Answer. This can be used at a later time if you forget your password. 8. Enter your e-mail address. You will receive e-mail notification when new information is available in [address] 9. Click Sign Up. You can now view and download portions of your medical record. 10. Click the Download Summary menu link to download a portable copy of your medical information. If you have questions, please visit the Frequently Asked Questions section of the [employer] website. Remember, [employer] is NOT to be used for urgent needs. For medical emergencies, dial 911. Now available from your iPhone and Android! Please provide this summary of care documentation to your next provider. Your primary care clinician is listed as Inocencia Choudhury. If you have any questions after today's visit, please call [phone].

## 2018-06-26 NOTE — PROGRESS NOTES
Linda Brown MD. [facility]              Patient: Alex Dejesus  : 1950      Today's Date: 2018            HISTORY OF PRESENT ILLNESS:     History of Present Illness:  Here for follow-up. He is doing OK he thinks. A little dizzy when he gets up - goes away quickly. No CP with Tums. Breathing is good. BP a little high at home. Feels well overall. PAST MEDICAL HISTORY:     Past Medical History:   Diagnosis Date    Atrial fibrillation ([address]     Afib 18 - spont converted to NSR    BPH (benign prostatic hyperplasia)     COPD (chronic obstructive pulmonary disease) (HCC)     mild COPD - 30 pack year smoking     Diabetes mellitus     Hernia of unspecified site of abdominal cavity without mention of obstruction or gangrene     High cholesterol     Hypertension     Iron deficiency anemia     JW visit     Obesity     Sleep apnea     uses CPAP         Past Surgical History:   Procedure Laterality Date    RECTUM SURGERY PROCEDURE UNLISTED             MEDICATIONS:     Current Outpatient Prescriptions   Medication Sig Dispense Refill    amLODIPine (NORVASC) 5 mg tablet Take 5 mg by mouth daily.  apixaban (ELIQUIS) 5 mg tablet Take 1 Tab by mouth two (2) times a day for 30 days. 180 Tab 3    sertraline (ZOLOFT) 100 mg tablet Take 50 mg by mouth daily.  cetirizine HCl (ALLER-BOSTON PO) Take  by mouth two (2) times a day.  montelukast (SINGULAIR) 10 mg tablet Take 10 mg by mouth daily.  tiotropium (SPIRIVA WITH HANDIHALER) 18 mcg inhalation capsule Take 1 Cap by inhalation daily.  nicotinic acid (NIACIN) 500 mg tablet Take 500 mg by mouth Daily (before breakfast).  raNITIdine (ZANTAC) 150 mg tablet Take 150 mg by mouth two (2) times a day.  iron polysaccharide complex (POLY-IRON PO) Take 150 mg by mouth two (2) times a day.  insulin NPH human isophane (NOVOLIN N SC) 25 Units by SubCUTAneous route two (2) times a day.       ascorbic acid, vitamin C, (VITAMIN C) 500 mg tablet Take  by mouth every seven (7) days.  ergocalciferol (ERGOCALCIFEROL) 50,000 unit capsule Take 50,000 Units by mouth every seven (7) days.  cyanocobalamin 1,000 mcg tablet Take 1,000 mcg by mouth every seven (7) days.  albuterol (PROAIR HFA) 90 mcg/actuation inhaler Take  by inhalation as needed.  CARTIA  mg ER capsule TAKE 1 CAPSULE BY MOUTH DAILY 90 Cap 3    metFORMIN (GLUMETZA) 1,000 mg TG24 24 hour tablet Take 2,000 mg by mouth.  omeprazole (PRILOSEC) 40 mg capsule Take 40 mg by mouth two (2) times a day.  tamsulosin (FLOMAX) 0.4 mg capsule Take 0.4 mg by mouth two (2) times a day.  rosuvastatin (CRESTOR) 40 mg tablet Take 40 mg by mouth daily. NON FORMULARY       omega-3 fatty acids-vitamin e (FISH OIL) 1,000 mg Cap Take 1 Cap by mouth daily. No Known Allergies          SOCIAL HISTORY:     Social History   Substance Use Topics    Smoking status: Former Smoker     Quit date: 6/3/1990    Smokeless tobacco: Never Used    Alcohol use Yes      Comment: 3 drinks per night           FAMILY HISTORY:     Family History   Problem Relation Age of Onset    Stroke Mother             REVIEW OF SYMPTOMS:      Review of Symptoms:  Constitutional: Negative for fever, chills  HEENT: Negative for nosebleeds, tinnitus, and vision changes. Respiratory: Negative for cough, wheezing  +AUSTIN  Cardiovascular: Negative for orthopnea, claudication, syncope, and PND. Gastrointestinal: Negative for abdominal pain, diarrhea, melena. Genitourinary: Negative for dysuria  Musculoskeletal: Negative for myalgias.    Skin: Negative for rash  Heme: No problems bleeding recently   Neurological: Negative for speech change and focal weakness.            PHYSICAL EXAM:      Physical Exam:  Visit Vitals    /76 (BP 1 Location: Left arm)    Pulse 76    Ht 5' 10\" (1.778 m)    Wt 249 lb 6.4 oz (113.1 kg)    BMI 35.79 kg/m2       Patient appears generally well, mood and affect are appropriate and pleasant. HEENT:  Hearing intact, non-icteric, normocephalic, atraumatic. Neck Exam: Supple, No JVD or carotid bruits. Lung Exam: Clear to auscultation, even breath sounds. Cardiac Exam: Regular rate and rhythm with 3/6 systolic murmur  Abdomen: Soft, non-tender, normal bowel sounds. No bruits or masses. Extremities: Moves all ext well. No lower extremity edema. Vascular: 2+ dorsalis pedis pulses bilaterally. Psych: Appropriate affect  Neuro - Grossly intact           LABS / OTHER STUDIES:               Lab Results   Component Value Date/Time     Sodium 142 05/01/2018 12:58 PM     Potassium 3.9 05/01/2018 12:58 PM     Chloride 106 05/01/2018 12:58 PM     CO2 24 05/01/2018 12:58 PM     Anion gap 12 05/01/2018 12:58 PM     Glucose 108 (H) 05/01/2018 12:58 PM     BUN 19 05/01/2018 12:58 PM     Creatinine 1.39 (H) 05/01/2018 12:58 PM     BUN/Creatinine ratio 14 05/01/2018 12:58 PM     GFR est AA >60 05/01/2018 12:58 PM     GFR est non-AA 51 (L) 05/01/2018 12:58 PM     Calcium 8.8 05/01/2018 12:58 PM            Lab Results   Component Value Date/Time     WBC 6.3 05/01/2018 12:58 PM     HGB 12.9 05/01/2018 12:58 PM     HCT 38.4 05/01/2018 12:58 PM     PLATELET 949 73/09/1890 12:58 PM     MCV 92.5 05/01/2018 12:58 PM               Lab Results   Component Value Date/Time     TSH 1.25 05/01/2018 12:59 PM      Component      Latest Ref Rng & Units 5/1/2018           1:22 PM   Occult blood, stool      NEG   NEGATIVE        Labs 2/5/16 - Iron 52, ferritin 20, Hgb 12.8  Labs 2/27/18 - A1c 7.2,ferritin 29, iron 77, Hgb 13.4, plt 187, chol 140, , LDL 63, HDL 42, CMP OK,            CARDIAC DIAGNOSTICS:      Cardiac Evaluation Includes:  Exercise cardiolite 2011 - 1. Objectively normal treadmill stress test (patient reached only 79% of  maximal predicted heart rate, thus decreasing the sensitivity of the test). His max workload was 9.1 METS.  2. Atypical, mild chest pain. 3. Normal exercise gated myocardial perfusion study without inducible  Ischemia. 4. Normal systolic function with an left ventricular ejection fraction of   77%.    Echo 5/21/18 - LVEF 60%. RV normal.  Mild MR.  AV sclerosis   Lexiscan Cardiolite 5/21/18 - normal MPI, LVEF 68%.       EKG 5/1/18 - Afib with RVR,   EKG 5/1/18 - NSR, normal               ASSESSMENT AND PLAN:      Assessment and Plan:  1) Afib 5/1/18 on ER presentation - converted to NSR on his own  - [id number] score is 3 (has 5% stroke risk per year)   - Stop Norvasc since on Cardizem (he was confused about med changes)   - Continue Eliquis 5 mg BID (reviewed risks/benefits)     2) Chest pain   - he has many CAD risk factors   - Lexiscan Cardiolite 5/21/18 - normal MPI, LVEF 68%. - Symptoms got better with Tums and is now playing golf without chest pain      3) HTN  - BP a little high at home sometimes   - will stop norvasc since he is on cardizem (also has some constipation)   - restart losartan at 50 mg daily. - if dizziness gets worse, he is to let me know - goal BP ideally < 130/80      4) Dyslipidemia  - on Crestor   - prior lipids OK      5) Anemia  - Records from Dr. Varsha Mccain reviewed - her note states that patient had iron deficiency anemia - but had negative GI workup with Dr. Melisa Morgan and had multiple stools that were negative heme. - hgb has been stable on iron   - follow CBC on Eliquis     6) See me in 6 months. Patient expressed understanding of the plan - questions were answered. Vacationed in [place] 2018. Is retired.   [address] Ludivina Castillo MD, [address]  [address] [place] [address], [facility]               [address]  [address], [place] Juarez[address]  Ph: [phone]  [phone]        ADDENDUM   8/30/2018  Reviewed his home BP's. Home BP looks good. Will have nurse let him know.

## 2018-07-09 ENCOUNTER — TELEPHONE (OUTPATIENT)
Dept: CARDIOLOGY CLINIC | Age: 68
End: 2018-07-09

## 2018-07-09 RX ORDER — DILTIAZEM HYDROCHLORIDE 180 MG/1
CAPSULE, COATED, EXTENDED RELEASE ORAL
Qty: 90 CAP | Refills: 3 | Status: SHIPPED | OUTPATIENT
Start: 2018-07-09 | End: 2018-10-03 | Stop reason: DRUGHIGH

## 2018-07-09 NOTE — TELEPHONE ENCOUNTER
Pt needing Felicitas Ackerman to call him back about his medication and [employer]. He can be reached at [phone].  [employer]

## 2018-07-10 NOTE — TELEPHONE ENCOUNTER
Per Angel Givens is available w/o PA. The prescription was cxl'd but does not state by whom or why. Pharm tech will reactivate rx.  copay will be approx $369.11 and pt has not met deductible yet and is considered tier 3 .   Pt notified and info faxed to [address] [place] [address] card   Group: [id number]  ID: [id number]

## 2018-07-23 ENCOUNTER — TELEPHONE (OUTPATIENT)
Dept: CARDIOLOGY CLINIC | Age: 68
End: 2018-07-23

## 2018-07-23 NOTE — TELEPHONE ENCOUNTER
Returned call. Patient requested information on Eliquis alternatives. Gave him Xarelto, Pradaxa, and coumadin. Patient will contact his prescription provider and notify our office of his choice. Patient also needs to verify that if he chooses coumadin that he will not be charged a copay since he is not seeing a physician.

## 2018-07-23 NOTE — TELEPHONE ENCOUNTER
Pt is requesting alternative medication for Eliquis due to it being too expensive. Wants to ask questions about getting on another medication.    Phone: [phone]

## 2018-07-24 NOTE — TELEPHONE ENCOUNTER
Pt calling to speak to you regarding the conversation he had yesterday about his medication. He said you would know what he's talking about. He can be reached @  [phone].      Thanks

## 2018-07-25 RX ORDER — WARFARIN SODIUM 5 MG/1
5 TABLET ORAL DAILY
Qty: 90 TAB | Refills: 0 | Status: SHIPPED | OUTPATIENT
Start: 2018-07-25 | End: 2018-09-14 | Stop reason: ALTCHOICE

## 2018-07-25 NOTE — TELEPHONE ENCOUNTER
Pt following up on yesterdays call, requesting his Coumadin(new medication) to be sent to THE [facility], to replace his Eliquis due to a cost difference. Phone #[phone].   Thanks

## 2018-08-09 ENCOUNTER — TELEPHONE (OUTPATIENT)
Dept: CARDIOLOGY CLINIC | Age: 68
End: 2018-08-09

## 2018-08-09 ENCOUNTER — CLINICAL SUPPORT (OUTPATIENT)
Dept: CARDIOLOGY CLINIC | Age: 68
End: 2018-08-09

## 2018-08-09 DIAGNOSIS — I48.91 ATRIAL FIBRILLATION, UNSPECIFIED TYPE (HCC): ICD-10-CM

## 2018-08-09 DIAGNOSIS — I10 ESSENTIAL HYPERTENSION: Chronic | ICD-10-CM

## 2018-08-09 DIAGNOSIS — Z79.01 LONG TERM (CURRENT) USE OF ANTICOAGULANTS: Primary | ICD-10-CM

## 2018-08-09 LAB
INR BLD: 1.2
PT POC: 14 SECONDS
VALID INTERNAL CONTROL?: YES

## 2018-08-09 NOTE — TELEPHONE ENCOUNTER
Pt was in the office for protime. Tuesday, playing golf. Dizzy, blurred vision, weak-quit playing and rode in cart . Drank 1 quart PowerAid Zero (no sugar added), then 2 quarts water (so he doesn't believe he was dehydrated). BP was 90/56     Been ok since.

## 2018-08-09 NOTE — TELEPHONE ENCOUNTER
Called [facility] to attempt a tier reduction for pt's Eliquis. As of now, the copay for 30 day supply is approx $48 per mo. Ref # is [id number], response may take 3 days.

## 2018-08-09 NOTE — TELEPHONE ENCOUNTER
MD Manolo Ortiz, RN        Caller: Unspecified (Today,  3:06 PM)                     Follow and let us know what BP is in a couple of days.

## 2018-08-09 NOTE — TELEPHONE ENCOUNTER
He will wait right now because he as picked up an additional insurance and will see what happens with that.   Will let me know

## 2018-08-14 ENCOUNTER — CLINICAL SUPPORT (OUTPATIENT)
Dept: CARDIOLOGY CLINIC | Age: 68
End: 2018-08-14

## 2018-08-14 DIAGNOSIS — I48.91 ATRIAL FIBRILLATION, UNSPECIFIED TYPE (HCC): ICD-10-CM

## 2018-08-14 DIAGNOSIS — Z79.01 LONG TERM (CURRENT) USE OF ANTICOAGULANTS: Primary | ICD-10-CM

## 2018-08-14 DIAGNOSIS — I10 ESSENTIAL HYPERTENSION: Chronic | ICD-10-CM

## 2018-08-14 LAB
INR BLD: 3.4
INR, EXTERNAL: 3.4 (ref 2–3)
PT POC: 40.5 SECONDS
VALID INTERNAL CONTROL?: YES

## 2018-08-17 ENCOUNTER — TELEPHONE (OUTPATIENT)
Dept: CARDIOLOGY CLINIC | Age: 68
End: 2018-08-17

## 2018-08-17 NOTE — TELEPHONE ENCOUNTER
Pt notified I have not seen BP report as I am in a different location today but will give a call Monday when I get them. He is fine with this.

## 2018-08-17 NOTE — TELEPHONE ENCOUNTER
Pt is requesting a call back in regards to his bp readings that he states he gave you.    Phone: [phone]

## 2018-08-20 ENCOUNTER — DOCUMENTATION ONLY (OUTPATIENT)
Dept: CARDIOLOGY CLINIC | Age: 68
End: 2018-08-20

## 2018-08-20 NOTE — PROGRESS NOTES
He said he waited 3 days to allow warfarin to be out of his system and went to [facility] to get the eliquis based on the [facility] letter of approval on the tier reduction. It now only costs $2.00!

## 2018-08-31 ENCOUNTER — TELEPHONE (OUTPATIENT)
Dept: CARDIOLOGY CLINIC | Age: 68
End: 2018-08-31

## 2018-08-31 NOTE — TELEPHONE ENCOUNTER
If he is having more recurrent afib, then needs to see me to see what we can do about that.    Thanks,   [employer]

## 2018-08-31 NOTE — TELEPHONE ENCOUNTER
He said if the  would call either tues or Wednesday AM to set up something for later that would be great. He is going away Thursday so it will have to be when he returns. BUT CALL TUES OR WED TO SET UP.

## 2018-08-31 NOTE — TELEPHONE ENCOUNTER
----- Message from Zhou Morris MD sent at 8/30/2018 10:52 PM EDT -----  Regarding: please call pt  Please call. Reviewed his home BP's. Home BP looks good. Will have nurse let him know.        Thanks,  SK

## 2018-08-31 NOTE — TELEPHONE ENCOUNTER
He said he is pleased the BP is okay, however, he still has a problem with going into a fib when he gets active. Is questioning what he can do about that?

## 2018-09-14 ENCOUNTER — TELEPHONE (OUTPATIENT)
Dept: CARDIOLOGY CLINIC | Age: 68
End: 2018-09-14

## 2018-09-14 NOTE — TELEPHONE ENCOUNTER
He needs samples until he can get worked out why his script went from $2.00 to $140. I tried to explain to him about the \"donut hole\" gap. He still wants to speak w/[employer] and I provided samples.

## 2018-09-14 NOTE — TELEPHONE ENCOUNTER
Pt would like to know if there are any Eliquis samples in the office that he can come by and .   Phone: [phone]

## 2018-09-20 ENCOUNTER — OFFICE VISIT (OUTPATIENT)
Dept: CARDIOLOGY CLINIC | Age: 68
End: 2018-09-20

## 2018-09-20 VITALS
BODY MASS INDEX: 36.08 KG/M2 | HEART RATE: 72 BPM | DIASTOLIC BLOOD PRESSURE: 72 MMHG | WEIGHT: 252 LBS | HEIGHT: 70 IN | SYSTOLIC BLOOD PRESSURE: 146 MMHG | RESPIRATION RATE: 16 BRPM

## 2018-09-20 DIAGNOSIS — I48.91 ATRIAL FIBRILLATION, UNSPECIFIED TYPE (HCC): Primary | ICD-10-CM

## 2018-09-20 DIAGNOSIS — I10 HYPERTENSION, UNSPECIFIED TYPE: Chronic | ICD-10-CM

## 2018-09-20 RX ORDER — TIOTROPIUM BROMIDE INHALATION SPRAY 3.12 UG/1
2 SPRAY, METERED RESPIRATORY (INHALATION) DAILY
COMMUNITY
Start: 2018-09-12 | End: 2020-06-16

## 2018-09-20 NOTE — PROGRESS NOTES
Chief Complaint Patient presents with  Irregular Heart Beat  
  AFIB, pt states he was golfing a month ago and felt like he was going into AFIB  Hypertension  Cholesterol Problem Hyperlipidemia 1. Have you been to the ER, urgent care clinic since your last visit? Hospitalized since your last visit? No 
 
2. Have you seen or consulted any other health care providers outside of the [facility] since your last visit? Include any pap smears or colon screening.  No

## 2018-09-20 NOTE — PROGRESS NOTES
Danielle Harley MD. [facility] Patient: Ellie Kwan : 1950 Today's Date: 2018 HISTORY OF PRESENT ILLNESS:  
 
History of Present Illness: 
Here for follow-up. About a month ago - He was golfing one day - started getting dizzy - pulse was fast - resolved on its own. BP ~ 142/75 at home. Has indigestion type complaints - better with Tums. No exertional chest pain. PAST MEDICAL HISTORY:  
 
Past Medical History:  
Diagnosis Date  Atrial fibrillation ([address] Afib 18 - spont converted to NSR  
 BPH (benign prostatic hyperplasia)  COPD (chronic obstructive pulmonary disease) (HCC) mild COPD - 30 pack year smoking  Diabetes mellitus  Hernia of unspecified site of abdominal cavity without mention of obstruction or gangrene  High cholesterol  Hypertension  Iron deficiency anemia [facility] visit  Obesity  Sleep apnea   
 uses CPAP Past Surgical History:  
Procedure Laterality Date  RECTUM SURGERY PROCEDURE UNLISTED MEDICATIONS:  
 
Current Outpatient Prescriptions Medication Sig Dispense Refill  sodium chloride (SALINE NASAL NA) by Nasal route as needed.  SPIRIVA RESPIMAT 2.5 mcg/actuation inhaler 2 Puffs daily.  apixaban (ELIQUIS) 5 mg tablet Take 1 Tab by mouth two (2) times a day. 56 Tab 0  
 dilTIAZem CD (CARTIA XT) 180 mg ER capsule TAKE 1 CAPSULE BY MOUTH DAILY 90 Cap 3  
 losartan (COZAAR) 50 mg tablet Take 1 Tab by mouth daily. 90 Tab 3  
 sertraline (ZOLOFT) 100 mg tablet Take 50 mg by mouth daily.  cetirizine HCl (ALLER-BOSTON PO) Take  by mouth daily.  montelukast (SINGULAIR) 10 mg tablet Take 10 mg by mouth daily.  tiotropium (SPIRIVA WITH HANDIHALER) 18 mcg inhalation capsule Take 1 Cap by inhalation daily.  nicotinic acid (NIACIN) 500 mg tablet Take 500 mg by mouth Daily (before breakfast).  raNITIdine (ZANTAC) 150 mg tablet Take 150 mg by mouth two (2) times a day.  iron polysaccharide complex (POLY-IRON PO) Take 150 mg by mouth two (2) times a day.  insulin NPH human isophane (NOVOLIN N SC) 25 Units by SubCUTAneous route two (2) times a day.  ascorbic acid, vitamin C, (VITAMIN C) 500 mg tablet Take  by mouth every seven (7) days.  ergocalciferol (ERGOCALCIFEROL) 50,000 unit capsule Take 50,000 Units by mouth every seven (7) days. Indications: PREVENTION OF VITAMIN D DEFICIENCY  cyanocobalamin 1,000 mcg tablet Take 1,000 mcg by mouth every seven (7) days. Indications: PREVENTION OF VITAMIN B12 DEFICIENCY    
 albuterol (PROAIR HFA) 90 mcg/actuation inhaler Take  by inhalation as needed.  metFORMIN (GLUMETZA) 1,000 mg TG24 24 hour tablet Take 1,500 mg by mouth.  omeprazole (PRILOSEC) 40 mg capsule Take 40 mg by mouth two (2) times a day.  tamsulosin (FLOMAX) 0.4 mg capsule Take 0.4 mg by mouth two (2) times a day.  rosuvastatin (CRESTOR) 40 mg tablet Take 40 mg by mouth daily. NON FORMULARY  omega-3 fatty acids-vitamin e (FISH OIL) 1,000 mg Cap Take 1 Cap by mouth daily. No Known Allergies SOCIAL HISTORY:  
 
Social History Substance Use Topics  Smoking status: Former Smoker Quit date: 6/3/1990  Smokeless tobacco: Never Used  Alcohol use Yes Comment: 3 drinks per night FAMILY HISTORY:  
 
Family History Problem Relation Age of Onset  Stroke Mother   
 
 
 
  
  
REVIEW OF SYMPTOMS:  
   
Review of Symptoms: 
Constitutional: Negative for fever, chills HEENT: Negative for nosebleeds, tinnitus, and vision changes. Respiratory: Negative for cough, wheezing  +[place] Cardiovascular: Negative for orthopnea, claudication, syncope, and PND. Gastrointestinal: Negative for abdominal pain, diarrhea, melena. Genitourinary: Negative for dysuria Musculoskeletal: Negative for myalgias. Skin: Negative for rash Heme: No problems bleeding recently Neurological: Negative for speech change and focal weakness.  
   
   
   
PHYSICAL EXAM:  
   
Physical Exam: 
Visit Vitals  /72 (BP 1 Location: Right arm, BP Patient Position: Sitting)  Pulse 72  Resp 16  
 Ht 5' 10\" (1.778 m)  Wt 252 lb (114.3 kg)  BMI 36.16 kg/m2  
 
 
  
Patient appears generally well, mood and affect are appropriate and pleasant. HEENT:  Hearing intact, non-icteric, normocephalic, atraumatic. Neck Exam: Supple, No JVD or carotid bruits. Lung Exam: Clear to auscultation, even breath sounds. Cardiac Exam: Regular rate and rhythm with 3/6 systolic murmur Abdomen: Soft, non-tender, normal bowel sounds. No bruits or masses. Extremities: Moves all ext well. No lower extremity edema. Vascular: 2+ dorsalis pedis pulses bilaterally. Psych: Appropriate affect Neuro - Grossly intact    
   
   
LABS / OTHER STUDIES:  
   
   
         
Lab Results Component Value Date/Time  
   Sodium 142 05/01/2018 12:58 PM  
   Potassium 3.9 05/01/2018 12:58 PM  
   Chloride 106 05/01/2018 12:58 PM  
   CO2 24 05/01/2018 12:58 PM  
   Anion gap 12 05/01/2018 12:58 PM  
   Glucose 108 (H) 05/01/2018 12:58 PM  
   BUN 19 05/01/2018 12:58 PM  
   Creatinine 1.39 (H) 05/01/2018 12:58 PM  
   BUN/Creatinine ratio 14 05/01/2018 12:58 PM  
   GFR est AA >60 05/01/2018 12:58 PM  
   GFR est non-AA 51 (L) 05/01/2018 12:58 PM  
   Calcium 8.8 05/01/2018 12:58 PM  
   
         
Lab Results Component Value Date/Time  
   WBC 6.3 05/01/2018 12:58 PM  
   HGB 12.9 05/01/2018 12:58 PM  
   HCT 38.4 05/01/2018 12:58 PM  
   PLATELET 418 47/57/1272 12:58 PM  
   MCV 92.5 05/01/2018 12:58 PM  
   
   
         
Lab Results Component Value Date/Time  
   TSH 1.25 05/01/2018 12:59 PM  
   
Component Latest Ref Rng & Units 5/1/2018  
    1:22 PM  
Occult blood, stool     NEG   NEGATIVE  
  
   
 Labs 2/5/16 - Iron 52, ferritin 20, Hgb 12.8 Labs 2/27/18 - A1c 7.2,ferritin 29, iron 77, Hgb 13.4, plt 187, chol 140, , LDL 63, HDL 42, CMP OK,  
   
 
   
   
CARDIAC DIAGNOSTICS:  
   
Cardiac Evaluation Includes: 
Exercise cardiolite 2011 - 1. Objectively normal treadmill stress test (patient reached only 79% of  maximal predicted heart rate, thus decreasing the sensitivity of the test). His max workload was 9.1 METS. 2. Atypical, mild chest pain.  3. Normal exercise gated myocardial perfusion study without inducible  Ischemia. 4. Normal systolic function with an left ventricular ejection fraction of   77%.   
Echo 5/21/18 - LVEF 60%.  RV normal.  Mild MR.  AV sclerosis Lexiscan Cardiolite 5/21/18 - normal MPI, LVEF 68%.    
   
EKG 5/1/18 - Afib with RVR,  EKG 5/1/18 - NSR, normal  
   
   
   
   
ASSESSMENT AND PLAN:  
   
Assessment and Plan: 
1) Afib 5/1/18 on ER presentation - converted to NSR on his own 
- He thinks he has had 8-10 other episodes of Afib which have been undocumented - [id number] score is 3 (has 5% stroke risk per year) - Continue Cardizem and Eliquis 5 mg BID (reviewed risks/benefits) - Check a 30 day event monitor to confirm that he indeed is having PAF (and then refer to EP for further management). 2) Chest pain  
- he has many CAD risk factors - Lexiscan Cardiolite 5/21/18 - normal MPI, LVEF 68%. - Symptoms got better with Tums and is now playing golf without chest pain  
   
3) HTN 
- BP ~ 142/75 at home and then drops after taking his meds - he thinks it may go down when active (but does not check it) - cont meds and follow   
   
4) Dyslipidemia 
- on Crestor - prior lipids OK  
   
5) Anemia 
- Records from Dr. Sukhi Kulkarni reviewed - her note states that patient had iron deficiency anemia - but had negative GI workup with Dr. Pardeep Frost and had multiple stools that were negative heme.   
- hgb has been stable on iron  
- follow CBC on Eliquis  
  
 6) See me in 3 months. Patient expressed understanding of the plan - questions were answered. Vacationed in [place] [facility] [place] retired.  
Dia Patel MD, [address] [facility] [address] [id number] [facility], [address]                [id number] [facility], [address] [place] [address] Ph: [phone]                                                              [phone] 
   
 
ADDENDUM   10/3/2018 Event Monitor 10/2/18 - Afib at at 6:30 PM (-110) and at 7 PM (HR 80-90 bpm) Event monitor shows PAF. Will increase Cardizem to 300 mg daily for tighter HR control. Follow BP at home. Will have nurse call with results and plan. Have him see EP in a few weeks to help manage Afib further.

## 2018-09-21 ENCOUNTER — CLINICAL SUPPORT (OUTPATIENT)
Dept: CARDIOLOGY CLINIC | Age: 68
End: 2018-09-21

## 2018-09-21 DIAGNOSIS — R00.0 TACHYCARDIA: Primary | ICD-10-CM

## 2018-09-21 DIAGNOSIS — I48.91 ATRIAL FIBRILLATION, UNSPECIFIED TYPE (HCC): ICD-10-CM

## 2018-09-27 ENCOUNTER — TELEPHONE (OUTPATIENT)
Dept: CARDIOLOGY CLINIC | Age: 68
End: 2018-09-27

## 2018-09-27 NOTE — TELEPHONE ENCOUNTER
Called pt back about his loop monitor ordered by Dr Rip Chaparro. He thought he only had to wear loop for 3 days. Explained it is for 30 days to rule out afib. Explained how the monitor works & what to do. He will call office to see if Dr Rip Chaparro is getting anything along the way to see if he can stop wearing earlier. Pt verified he understands instructions.

## 2018-10-03 ENCOUNTER — TELEPHONE (OUTPATIENT)
Dept: CARDIOLOGY CLINIC | Age: 68
End: 2018-10-03

## 2018-10-03 RX ORDER — DILTIAZEM HYDROCHLORIDE 300 MG/1
300 CAPSULE, COATED, EXTENDED RELEASE ORAL DAILY
Qty: 90 CAP | Refills: 2 | Status: SHIPPED | OUTPATIENT
Start: 2018-10-03 | End: 2021-06-24 | Stop reason: SDUPTHER

## 2018-10-03 NOTE — TELEPHONE ENCOUNTER
----- Message from Nola Moreland MD sent at 10/3/2018  4:29 PM EDT -----  Regarding: please call patient and get Rx and appt  Please call. Needs Rx below and appt. Event Monitor 10/2/18 - Afib at at 6:30 PM (-110) and at 7 PM (HR 80-90 bpm)    Event monitor shows PAF. Will increase Cardizem to 300 mg daily for tighter HR control. Follow BP at home. Will have nurse call with results and plan. Have him see EP in a few weeks to help manage Afib further.      Thanks,  SK

## 2018-10-15 ENCOUNTER — TELEPHONE (OUTPATIENT)
Dept: CARDIOLOGY CLINIC | Age: 68
End: 2018-10-15

## 2018-10-15 NOTE — TELEPHONE ENCOUNTER
Pt called asking about the results of his heart monitor. He said he wanted to make sure the results were okay before he sent the monitor back. Pt can be reached @ [phone].      Thanks

## 2018-10-15 NOTE — TELEPHONE ENCOUNTER
Will forward to Dr Dasia Meehan. Pt has had monitor since 9/30/18 and is seeing Dr Aide Hutchins 11/7/18 for a fib.

## 2018-10-18 ENCOUNTER — TELEPHONE (OUTPATIENT)
Dept: CARDIOLOGY CLINIC | Age: 68
End: 2018-10-18

## 2018-10-18 NOTE — TELEPHONE ENCOUNTER
Patient stated his insurance would not fill his amlodipine so he needs the approval that he no longer needs to take this   Phone: [phone]

## 2018-10-22 NOTE — TELEPHONE ENCOUNTER
Informed pt that last week I had rec'd a faxed request for the amlodipine and I manually faxed back to them that this was stopped in June.

## 2018-11-01 NOTE — PROGRESS NOTES
LOV:9/20/18(Arabella)  Reason For Visit: New Patient, Atrial Fib  Referred By: Dr. Boston Torres  EKG:NO   Event Monitor:9/30/2018 - 10/30/2018

## 2018-11-05 ENCOUNTER — TELEPHONE (OUTPATIENT)
Dept: CARDIOLOGY CLINIC | Age: 68
End: 2018-11-05

## 2018-11-05 NOTE — TELEPHONE ENCOUNTER
pt is calling in regards to his colonoscopy. Unfortunately pt is experiencing blood in his urine and patient had to r/s his colonosopy. Pt stated that since he is on eliquis he wanted to know when will be the best time to start back on his medication.  And pt also wanted nurse to know that he is also in the process of making an appointment w/ a urologist.   phone# [phone]

## 2018-11-05 NOTE — TELEPHONE ENCOUNTER
Hold eliquis until bleeding stops.  When better, restart next day and see what happens - if bleeds again, then would have  To stop again. Stephanie Javed him keep us updated.    Thanks,   SK

## 2018-11-05 NOTE — TELEPHONE ENCOUNTER
He stopped eliquis Saturday pm in prep for colonoscopy this AM, however, yesterday he made two trips to bathroom to void and second time there was blood. He said the first time he went to bathroom, it did not feel like he finished going and when he voided the second time is when he noted the blood. He contacted his PCP on call- was instructed to go to [facility] for urinalysis which he did and it came back positive. He is in the process of getting a urology appt. Questioning if he should continue with eliquis.  He DID take last night's dose but none yet this AM.

## 2018-11-07 ENCOUNTER — OFFICE VISIT (OUTPATIENT)
Dept: CARDIOLOGY CLINIC | Age: 68
End: 2018-11-07

## 2018-11-07 VITALS
RESPIRATION RATE: 12 BRPM | WEIGHT: 249 LBS | HEIGHT: 70 IN | HEART RATE: 73 BPM | BODY MASS INDEX: 35.65 KG/M2 | OXYGEN SATURATION: 99 % | DIASTOLIC BLOOD PRESSURE: 68 MMHG | SYSTOLIC BLOOD PRESSURE: 138 MMHG

## 2018-11-07 DIAGNOSIS — I48.91 ATRIAL FIBRILLATION, UNSPECIFIED TYPE (HCC): Primary | ICD-10-CM

## 2018-11-07 RX ORDER — SULFAMETHOXAZOLE AND TRIMETHOPRIM 800; 160 MG/1; MG/1
TABLET ORAL
COMMUNITY
Start: 2018-11-04 | End: 2018-12-10

## 2018-11-07 RX ORDER — FLECAINIDE ACETATE 50 MG/1
50 TABLET ORAL 2 TIMES DAILY
Qty: 60 TAB | Refills: 3 | Status: SHIPPED | OUTPATIENT
Start: 2018-11-07 | End: 2018-11-07 | Stop reason: SDUPTHER

## 2018-11-07 NOTE — PROGRESS NOTES
1. Have you been to the ER, urgent care clinic since your last visit? Hospitalized since your last visit? Yes When: 11/4/2018 Where: [facility] Reason for visit: Back pain/ Blood in Urine    2. Have you seen or consulted any other health care providers outside of the [address] [place] [address] since your last visit? Include any pap smears or colon screening. No    Chief Complaint   Patient presents with    Irregular Heart Beat     77 y/o male reports to office from visit with Dr. Leonid Benoit. LOV: 9/20/2018. Pt denies any dizziness, Chest pain, SOB. Pt reports Med Rec. Completed. Pt reports would like to discuss pre-op for future colonoscopy.        Visit Vitals  /68 (BP 1 Location: Left arm, BP Patient Position: Sitting)   Pulse 73   Resp 12   Ht 5' 10\" (1.778 m)   Wt 249 lb (112.9 kg)   SpO2 99%   BMI 35.73 kg/m²

## 2018-11-07 NOTE — PROGRESS NOTES
HISTORY OF PRESENTING ILLNESS      Ruben Hayes is a 76 y.o. male with hypertenstion, diabetes, COPD, obesity and PAF who is refererred for further management of atrial fibrillation. He was seen in the ER in 5/2018 for AF with RVR; however, converted to NSR on his own. He reports previous episodes of palpitations. He was started on Cardizem and Eliquis 5 mg BID for a CHADsVASC of 3. Previous echocardiogram demonstrated preserved LV function and stress testing failed to demonstrated ischemia. He reports recent hematuria for which she was evaluated at an urgent care. He was started on Bactrim for possible urinary tract infection and was advised to hold his Eliquis. He is due to restart Eliquis today. He also has a history of anemia for which a colonoscopy in the past was unrevealing. He has a possible hiatal hernia and is planned for upper and lower scope. He has had multiple episodes of atrial fibrillation on his 30-day monitor associated with tachycardia. He is asymptomatic during these episodes.          ACTIVE PROBLEM LIST     Patient Active Problem List    Diagnosis Date Noted    Atrial fibrillation ([address]     COPD (chronic obstructive pulmonary disease) ([address]     Hypertension     Iron deficiency anemia     Chest pain, unspecified 12/12/2011    DM (diabetes mellitus) ([address] 12/12/2011    HTN (hypertension) 12/12/2011    Hyperlipidemia 12/12/2011    Obesity (BMI 30-39.9) 12/12/2011    Tobacco abuse 12/12/2011           PAST MEDICAL HISTORY     Past Medical History:   Diagnosis Date    Atrial fibrillation ([address]     Afib 5/1/18 - spont converted to NSR    BPH (benign prostatic hyperplasia)     COPD (chronic obstructive pulmonary disease) (HCC)     mild COPD - 30 pack year smoking     Diabetes mellitus     Hernia of unspecified site of abdominal cavity without mention of obstruction or gangrene     High cholesterol     Hypertension     Iron deficiency anemia     JW visit     Obesity  Sleep apnea     uses CPAP           PAST SURGICAL HISTORY     Past Surgical History:   Procedure Laterality Date    RECTUM SURGERY PROCEDURE UNLISTED            ALLERGIES     No Known Allergies       FAMILY HISTORY     Family History   Problem Relation Age of Onset    Stroke Mother     negative for cardiac disease       SOCIAL HISTORY     Social History     Socioeconomic History    Marital status:      Spouse name: Not on file    Number of children: Not on file    Years of education: Not on file    Highest education level: Not on file   Social Needs    Financial resource strain: Not on file    Food insecurity - worry: Not on file    Food insecurity - inability: Not on file   [employer] needs - medical: Not on file   [employer] needs - non-medical: Not on file   Occupational History    Not on file   Tobacco Use    Smoking status: Former Smoker     Last attempt to quit: 6/3/1990     Years since quittin.4    Smokeless tobacco: Never Used   Substance and Sexual Activity    Alcohol use: Yes     Comment: 3 drinks per night    Drug use: No    Sexual activity: Not on file   Other Topics Concern    Not on file   Social History Narrative    Not on file         MEDICATIONS     Current Outpatient Medications   Medication Sig    dilTIAZem CD (CARDIZEM CD) 300 mg ER capsule Take 1 Cap by mouth daily.  sodium chloride (SALINE NASAL NA) by Nasal route as needed.  SPIRIVA RESPIMAT 2.5 mcg/actuation inhaler 2 Puffs daily.  apixaban (ELIQUIS) 5 mg tablet Take 1 Tab by mouth two (2) times a day.  losartan (COZAAR) 50 mg tablet Take 1 Tab by mouth daily.  sertraline (ZOLOFT) 100 mg tablet Take 50 mg by mouth daily.  cetirizine HCl (ALLER-BOSTON PO) Take  by mouth daily.  montelukast (SINGULAIR) 10 mg tablet Take 10 mg by mouth daily.  tiotropium (SPIRIVA WITH HANDIHALER) 18 mcg inhalation capsule Take 1 Cap by inhalation daily.     nicotinic acid (NIACIN) 500 mg tablet Take 500 mg by mouth Daily (before breakfast).  raNITIdine (ZANTAC) 150 mg tablet Take 150 mg by mouth two (2) times a day.  iron polysaccharide complex (POLY-IRON PO) Take 150 mg by mouth two (2) times a day.  insulin NPH human isophane (NOVOLIN N SC) 25 Units by SubCUTAneous route two (2) times a day.  ascorbic acid, vitamin C, (VITAMIN C) 500 mg tablet Take  by mouth every seven (7) days.  ergocalciferol (ERGOCALCIFEROL) 50,000 unit capsule Take 50,000 Units by mouth every seven (7) days. Indications: PREVENTION OF VITAMIN D DEFICIENCY    cyanocobalamin 1,000 mcg tablet Take 1,000 mcg by mouth every seven (7) days. Indications: PREVENTION OF VITAMIN B12 DEFICIENCY    albuterol (PROAIR HFA) 90 mcg/actuation inhaler Take  by inhalation as needed.  metFORMIN (GLUMETZA) 1,000 mg TG24 24 hour tablet Take 1,500 mg by mouth.  omeprazole (PRILOSEC) 40 mg capsule Take 40 mg by mouth two (2) times a day.  tamsulosin (FLOMAX) 0.4 mg capsule Take 0.4 mg by mouth two (2) times a day.  rosuvastatin (CRESTOR) 40 mg tablet Take 40 mg by mouth daily. NON FORMULARY     omega-3 fatty acids-vitamin e (FISH OIL) 1,000 mg Cap Take 1 Cap by mouth daily. No current facility-administered medications for this visit. I have reviewed the nurses notes, vitals, problem list, allergy list, medical history, family, social history and medications. REVIEW OF SYMPTOMS      General: Pt denies excessive weight gain or loss. Pt is able to conduct ADL's  HEENT: Denies blurred vision, headaches, hearing loss, epistaxis and difficulty swallowing. Respiratory: Denies cough, congestion, shortness of breath, AUSTIN, wheezing or stridor.   Cardiovascular: Denies precordial pain, palpitations, edema or PND  Gastrointestinal: Denies poor appetite, indigestion, abdominal pain or blood in stool  Genitourinary: Denies hematuria, dysuria, increased urinary frequency  Musculoskeletal: Denies joint pain or swelling from muscles or joints  Neurologic: Denies tremor, paresthesias, headache, or sensory motor disturbance  Psychiatric: Denies confusion, insomnia, depression  Integumentray: Denies rash, itching or ulcers. Hematologic: Denies easy bruising, bleeding       PHYSICAL EXAMINATION      There were no vitals filed for this visit. General: Well developed, in no acute distress. HEENT: No jaundice, oral mucosa moist, no oral ulcers  Neck: Supple, no stiffness, no lymphadenopathy, supple  Heart:  Normal S1/S2 negative S3 or S4. Regular, no murmur, gallop or rub, no jugular venous distention  Respiratory: Clear bilaterally x 4, no wheezing or rales  Abdomen:   Soft, non-tender, bowel sounds are active.   Extremities:  No edema, normal cap refill, no cyanosis. Musculoskeletal: No clubbing, no deformities  Neuro: A&Ox3, speech clear, gait stable, cooperative, no focal neurologic deficits  Skin: Skin color is normal. No rashes or lesions. Non diaphoretic, moist.  Vascular: 2+ pulses symmetric in all extremities       DIAGNOSTIC DATA      EKG:        LABORATORY DATA      Lab Results   Component Value Date/Time    WBC 6.3 05/01/2018 12:58 PM    HGB 12.9 05/01/2018 12:58 PM    HCT 38.4 05/01/2018 12:58 PM    PLATELET 405 16/65/2721 12:58 PM    MCV 92.5 05/01/2018 12:58 PM      Lab Results   Component Value Date/Time    Sodium 142 05/01/2018 12:58 PM    Potassium 3.9 05/01/2018 12:58 PM    Chloride 106 05/01/2018 12:58 PM    CO2 24 05/01/2018 12:58 PM    Anion gap 12 05/01/2018 12:58 PM    Glucose 108 (H) 05/01/2018 12:58 PM    BUN 19 05/01/2018 12:58 PM    Creatinine 1.39 (H) 05/01/2018 12:58 PM    BUN/Creatinine ratio 14 05/01/2018 12:58 PM    GFR est AA >60 05/01/2018 12:58 PM    GFR est non-AA 51 (L) 05/01/2018 12:58 PM    Calcium 8.8 05/01/2018 12:58 PM           ASSESSMENT      1. Atrial fibrillation   A. Paroxysmal  2. Hypertension  3. Dyslipidemia  4. Anemia  5. Hematuria  6. Urinary tract infection?        PLAN     Trial of restarting Eliquis. If intolerant may consider left atrial appendage occlusion in the future. Discussed options of a trial of an antiarrhythmic drug versus AF ablation due to his recurrent episodes of atrial fibrillation with rapid ventricular response. He wishes to proceed with a trial of drug therapy. We will start flecainide 50 mg twice daily and repeat a 30-day monitor to evaluate for efficacy. FOLLOW-UP     1 month      Thank you, Jerry Velasco MD and Dr. Matthew Flores for allowing me to participate in the care of this extraordinarily pleasant male. Please do not hesitate to contact me for further questions/concerns.          Trent Gilbert MD  Cardiac Electrophysiology / Cardiology    [facility]  [address] [facility], [address]  [phone] / [phone] Fax   [phone] / [phone] Fax

## 2018-11-08 RX ORDER — FLECAINIDE ACETATE 50 MG/1
50 TABLET ORAL 2 TIMES DAILY
Qty: 180 TAB | Refills: 3 | Status: SHIPPED | OUTPATIENT
Start: 2018-11-08 | End: 2018-11-16 | Stop reason: ALTCHOICE

## 2018-11-08 NOTE — TELEPHONE ENCOUNTER
Requested Prescriptions     Signed Prescriptions Disp Refills    flecainide (TAMBOCOR) 50 mg tablet 180 Tab 3     Sig: Take 1 Tab by mouth two (2) times a day. Authorizing Provider: Anjali Ivan     Ordering User: Geno LING     90 day refill per patient request and verbal order Dr. Alphonzo Nissen.

## 2018-11-14 ENCOUNTER — TELEPHONE (OUTPATIENT)
Dept: CARDIOLOGY CLINIC | Age: 68
End: 2018-11-14

## 2018-11-14 NOTE — TELEPHONE ENCOUNTER
Returned call. Spoke with patient. Stated since starting Flecainide he has noticed increased SOB, dizziness and body aches. Dizziness noticed when he gets up or turns his head. Held morning dose of  Flecainide. Requesting to try new medication as he feels he is intolerant of Flecainide. Please advise.

## 2018-11-14 NOTE — TELEPHONE ENCOUNTER
Pt called to discuss new medication he was recently prescribed that is causing SOB, Dizziness, back pian, & sleep concerns. Pt expressed he currently doesn't experience any SOB, but wanted to discus symptoms with nurse.   Phone #[phone]  Thanks

## 2018-11-16 ENCOUNTER — TELEPHONE (OUTPATIENT)
Dept: CARDIOLOGY CLINIC | Age: 68
End: 2018-11-16

## 2018-11-16 DIAGNOSIS — I48.91 ATRIAL FIBRILLATION, UNSPECIFIED TYPE (HCC): Primary | ICD-10-CM

## 2018-11-16 DIAGNOSIS — I48.91 ATRIAL FIBRILLATION, UNSPECIFIED TYPE (HCC): ICD-10-CM

## 2018-11-16 RX ORDER — PROPAFENONE HYDROCHLORIDE 225 MG/1
225 CAPSULE, EXTENDED RELEASE ORAL 2 TIMES DAILY
Qty: 60 CAP | Refills: 3 | Status: SHIPPED | OUTPATIENT
Start: 2018-11-16 | End: 2018-11-16 | Stop reason: SDUPTHER

## 2018-11-16 NOTE — TELEPHONE ENCOUNTER
Returned call. Spoke with patient. Patient confirmed he has been off of Flecainide since our last telephone conversation. Will start rythmol 225 bid. Instructed to call office with any concerns. Requested Prescriptions     Signed Prescriptions Disp Refills    propafenone SR (RYTHMOL SR) 225 mg SR capsule 60 Cap 3     Sig: Take 1 Cap by mouth two (2) times a day.      Authorizing Provider: Eligio Becerra     Ordering User: Holger Arenas

## 2018-11-19 RX ORDER — PROPAFENONE HYDROCHLORIDE 225 MG/1
CAPSULE, EXTENDED RELEASE ORAL
Qty: 180 CAP | Refills: 3 | Status: SHIPPED | OUTPATIENT
Start: 2018-11-19 | End: 2018-12-10

## 2018-11-19 NOTE — TELEPHONE ENCOUNTER
Requested Prescriptions     Signed Prescriptions Disp Refills    propafenone SR (RYTHMOL SR) 225 mg SR capsule 180 Cap 3     Sig: TAKE 1 CAPSULE BY MOUTH TWICE DAILY     Authorizing Provider: Eligio Becerra     Ordering User: Paul Persaud     Refill per verbal order Dr. Caleb Keys.

## 2018-12-04 NOTE — PROGRESS NOTES
LOV:11/7/18  Reason For Visit: Follow Up, Paroxysmal Atrial Fib  EKG: YES (Propafenone started 11/191/8)  Event Monitor:9/30/2018 - 10/30/2018

## 2018-12-10 ENCOUNTER — TELEPHONE (OUTPATIENT)
Dept: CARDIOLOGY CLINIC | Age: 68
End: 2018-12-10

## 2018-12-10 ENCOUNTER — OFFICE VISIT (OUTPATIENT)
Dept: CARDIOLOGY CLINIC | Age: 68
End: 2018-12-10

## 2018-12-10 VITALS
HEART RATE: 74 BPM | RESPIRATION RATE: 16 BRPM | SYSTOLIC BLOOD PRESSURE: 158 MMHG | DIASTOLIC BLOOD PRESSURE: 64 MMHG | BODY MASS INDEX: 36.22 KG/M2 | HEIGHT: 70 IN | WEIGHT: 253 LBS

## 2018-12-10 DIAGNOSIS — I48.0 PAROXYSMAL ATRIAL FIBRILLATION (HCC): ICD-10-CM

## 2018-12-10 DIAGNOSIS — I10 HYPERTENSION, UNSPECIFIED TYPE: Primary | Chronic | ICD-10-CM

## 2018-12-10 RX ORDER — FLECAINIDE ACETATE 50 MG/1
50 TABLET ORAL 2 TIMES DAILY
COMMUNITY
Start: 2018-11-08 | End: 2019-01-10

## 2018-12-10 NOTE — PROGRESS NOTES
HISTORY OF PRESENTING ILLNESS      Veria Sever is a 76 y.o. male with hypertenstion, diabetes, COPD, obesity and PAF who is refererred for further management of atrial fibrillation. He was seen in the ER in 5/2018 for AF with RVR; however, converted to NSR on his own. He reports previous episodes of palpitations. He was started on Cardizem and Eliquis 5 mg BID for a CHADsVASC of 3. Previous echocardiogram demonstrated preserved LV function and stress testing failed to demonstrated ischemia. He experienced some hematuria during a urinary tract infection which is now resolved. He has resumed Eliquis without recurrent hematuria. Previous monitoring demonstrated episodes of atrial fibrillation that were asymptomatic. He is currently on flecainide and diltiazem however is somewhat uncertain whether he is taking both of these.        ACTIVE PROBLEM LIST     Patient Active Problem List    Diagnosis Date Noted    Atrial fibrillation ([address]     COPD (chronic obstructive pulmonary disease) ([address]     Hypertension     Iron deficiency anemia     Chest pain, unspecified 12/12/2011    DM (diabetes mellitus) ([address] 12/12/2011    HTN (hypertension) 12/12/2011    Hyperlipidemia 12/12/2011    Obesity (BMI 30-39.9) 12/12/2011    Tobacco abuse 12/12/2011           PAST MEDICAL HISTORY     Past Medical History:   Diagnosis Date    Atrial fibrillation ([address]     Afib 5/1/18 - spont converted to NSR    BPH (benign prostatic hyperplasia)     COPD (chronic obstructive pulmonary disease) (HCC)     mild COPD - 30 pack year smoking     Diabetes mellitus     Hernia of unspecified site of abdominal cavity without mention of obstruction or gangrene     High cholesterol     Hypertension     Iron deficiency anemia     JW visit     Obesity     Sleep apnea     uses CPAP           PAST SURGICAL HISTORY     Past Surgical History:   Procedure Laterality Date    RECTUM SURGERY PROCEDURE UNLISTED            ALLERGIES No Known Allergies       FAMILY HISTORY     Family History   Problem Relation Age of Onset    Stroke Mother     negative for cardiac disease       SOCIAL HISTORY     Social History     Socioeconomic History    Marital status:      Spouse name: Not on file    Number of children: Not on file    Years of education: Not on file    Highest education level: Not on file   Tobacco Use    Smoking status: Former Smoker     Last attempt to quit: 6/3/1990     Years since quittin.5    Smokeless tobacco: Never Used   Substance and Sexual Activity    Alcohol use: Yes     Comment: 3 drinks per night    Drug use: No         MEDICATIONS     Current Outpatient Medications   Medication Sig    flecainide (TAMBOCOR) 50 mg tablet Take 50 mg by mouth two (2) times a day.  dilTIAZem CD (CARDIZEM CD) 300 mg ER capsule Take 1 Cap by mouth daily.  sodium chloride (SALINE NASAL NA) by Nasal route as needed.  SPIRIVA RESPIMAT 2.5 mcg/actuation inhaler 2 Puffs daily.  apixaban (ELIQUIS) 5 mg tablet Take 1 Tab by mouth two (2) times a day.  losartan (COZAAR) 50 mg tablet Take 1 Tab by mouth daily.  sertraline (ZOLOFT) 100 mg tablet Take 50 mg by mouth daily.  cetirizine HCl (ALLER-BOSTON PO) Take  by mouth two (2) times daily as needed.  montelukast (SINGULAIR) 10 mg tablet Take 10 mg by mouth daily.  nicotinic acid (NIACIN) 500 mg tablet Take 500 mg by mouth Daily (before breakfast).  raNITIdine (ZANTAC) 150 mg tablet Take 150 mg by mouth two (2) times a day.  iron polysaccharide complex (POLY-IRON PO) Take 150 mg by mouth two (2) times a day.  insulin NPH human isophane (NOVOLIN N SC) 30 Units by SubCUTAneous route two (2) times a day.  ascorbic acid, vitamin C, (VITAMIN C) 500 mg tablet Take  by mouth every seven (7) days.  ergocalciferol (ERGOCALCIFEROL) 50,000 unit capsule Take 50,000 Units by mouth every seven (7) days.  Indications: PREVENTION OF VITAMIN D DEFICIENCY    cyanocobalamin 1,000 mcg tablet Take 1,000 mcg by mouth every seven (7) days. Indications: PREVENTION OF VITAMIN B12 DEFICIENCY    albuterol (PROAIR HFA) 90 mcg/actuation inhaler Take  by inhalation as needed.  metFORMIN (GLUMETZA) 1,000 mg TG24 24 hour tablet Take 1,500 mg by mouth.  omeprazole (PRILOSEC) 40 mg capsule Take 40 mg by mouth two (2) times a day.  tamsulosin (FLOMAX) 0.4 mg capsule Take 0.4 mg by mouth two (2) times a day.  rosuvastatin (CRESTOR) 40 mg tablet Take 40 mg by mouth daily. NON FORMULARY     omega-3 fatty acids-vitamin e (FISH OIL) 1,000 mg Cap Take 1 Cap by mouth daily. No current facility-administered medications for this visit. I have reviewed the nurses notes, vitals, problem list, allergy list, medical history, family, social history and medications. REVIEW OF SYMPTOMS      General: Pt denies excessive weight gain or loss. Pt is able to conduct ADL's  HEENT: Denies blurred vision, headaches, hearing loss, epistaxis and difficulty swallowing. Respiratory: Denies cough, congestion, shortness of breath, AUSTIN, wheezing or stridor. Cardiovascular: Denies precordial pain, palpitations, edema or PND  Gastrointestinal: Denies poor appetite, indigestion, abdominal pain or blood in stool  Genitourinary: Denies hematuria, dysuria, increased urinary frequency  Musculoskeletal: Denies joint pain or swelling from muscles or joints  Neurologic: Denies tremor, paresthesias, headache, or sensory motor disturbance  Psychiatric: Denies confusion, insomnia, depression  Integumentray: Denies rash, itching or ulcers. Hematologic: Denies easy bruising, bleeding       PHYSICAL EXAMINATION      Vitals:    12/10/18 1100   BP: 158/64   Pulse: 74   Resp: 16   Weight: 253 lb (114.8 kg)   Height: 5' 10\" (1.778 m)     General: Well developed, in no acute distress.   HEENT: No jaundice, oral mucosa moist, no oral ulcers  Neck: Supple, no stiffness, no lymphadenopathy, supple  Heart:  Normal S1/S2 negative S3 or S4. Regular, no murmur, gallop or rub, no jugular venous distention  Respiratory: Clear bilaterally x 4, no wheezing or rales  Abdomen:   Soft, non-tender, bowel sounds are active.   Extremities:  No edema, normal cap refill, no cyanosis. Musculoskeletal: No clubbing, no deformities  Neuro: A&Ox3, speech clear, gait stable, cooperative, no focal neurologic deficits  Skin: Skin color is normal. No rashes or lesions. Non diaphoretic, moist.  Vascular: 2+ pulses symmetric in all extremities       DIAGNOSTIC DATA      EKG: Sinus rhythm       LABORATORY DATA      Lab Results   Component Value Date/Time    WBC 6.3 05/01/2018 12:58 PM    HGB 12.9 05/01/2018 12:58 PM    HCT 38.4 05/01/2018 12:58 PM    PLATELET 257 60/77/4451 12:58 PM    MCV 92.5 05/01/2018 12:58 PM      Lab Results   Component Value Date/Time    Sodium 142 05/01/2018 12:58 PM    Potassium 3.9 05/01/2018 12:58 PM    Chloride 106 05/01/2018 12:58 PM    CO2 24 05/01/2018 12:58 PM    Anion gap 12 05/01/2018 12:58 PM    Glucose 108 (H) 05/01/2018 12:58 PM    BUN 19 05/01/2018 12:58 PM    Creatinine 1.39 (H) 05/01/2018 12:58 PM    BUN/Creatinine ratio 14 05/01/2018 12:58 PM    GFR est AA >60 05/01/2018 12:58 PM    GFR est non-AA 51 (L) 05/01/2018 12:58 PM    Calcium 8.8 05/01/2018 12:58 PM           ASSESSMENT       1. Atrial fibrillation              A. Paroxysmal  2. Hypertension  3. Dyslipidemia  4. Anemia  5. Hematuria - resolved       PLAN     Continue current drug regimen. Patient will notify us of whether he is actually taking diltiazem as well as flecainide. FOLLOW-UP       Thank you, Patrice Buckner MD for allowing me to participate in the care of this extraordinarily pleasant male. Please do not hesitate to contact me for further questions/concerns.          Margaret Lyon MD  Cardiac Electrophysiology / Cardiology    [address]  [address] [address], [facility], [address] [place] [address],[address]  [address]    [facility]  [phone] / [phone] Fax   [phone] / [phone] Fax

## 2018-12-10 NOTE — TELEPHONE ENCOUNTER
Called patient. Unable to take phone call. Called to verify his current medications. Also wanted to discuss having a 2 week monitor set up for him in January per Dr. Flower Cline.

## 2018-12-10 NOTE — PROGRESS NOTES
Chief Complaint   Patient presents with    Irregular Heart Beat     PAF    Other     Montior never ordered    Medication Evaluation     Patient states he stopped taking medication prescribed while taking anitobiotics for kidney infection few weeeks ago      Visit Vitals  /64 (BP 1 Location: Right arm, BP Patient Position: Sitting)   Pulse 74   Resp 16   Ht 5' 10\" (1.778 m)   Wt 253 lb (114.8 kg)   BMI 36.30 kg/m²

## 2018-12-27 ENCOUNTER — APPOINTMENT (OUTPATIENT)
Dept: CT IMAGING | Age: 68
End: 2018-12-27
Attending: EMERGENCY MEDICINE
Payer: MEDICARE

## 2018-12-27 ENCOUNTER — HOSPITAL ENCOUNTER (EMERGENCY)
Age: 68
Discharge: HOME | End: 2018-12-27
Attending: EMERGENCY MEDICINE
Payer: MEDICARE

## 2018-12-27 ENCOUNTER — APPOINTMENT (OUTPATIENT)
Dept: GENERAL RADIOLOGY | Age: 68
End: 2018-12-27
Attending: EMERGENCY MEDICINE
Payer: MEDICARE

## 2018-12-27 VITALS
TEMPERATURE: 97.8 F | OXYGEN SATURATION: 96 % | BODY MASS INDEX: 30.06 KG/M2 | WEIGHT: 210 LBS | SYSTOLIC BLOOD PRESSURE: 152 MMHG | DIASTOLIC BLOOD PRESSURE: 88 MMHG | RESPIRATION RATE: 18 BRPM | HEART RATE: 90 BPM | HEIGHT: 70 IN

## 2018-12-27 DIAGNOSIS — R00.2 PALPITATIONS: Primary | ICD-10-CM

## 2018-12-27 LAB
ALBUMIN SERPL-MCNC: 3.7 G/DL (ref 3.5–5)
ALBUMIN/GLOB SERPL: 1.1 {RATIO} (ref 1.1–2.2)
ALP SERPL-CCNC: 71 U/L (ref 45–117)
ALT SERPL-CCNC: 26 U/L (ref 12–78)
ANION GAP SERPL CALC-SCNC: 13 MMOL/L (ref 5–15)
APPEARANCE UR: CLEAR
AST SERPL-CCNC: 23 U/L (ref 15–37)
ATRIAL RATE: 102 BPM
BACTERIA URNS QL MICRO: NEGATIVE /HPF
BASOPHILS # BLD: 0 K/UL (ref 0–0.1)
BASOPHILS NFR BLD: 0 % (ref 0–1)
BILIRUB SERPL-MCNC: 0.2 MG/DL (ref 0.2–1)
BILIRUB UR QL: NEGATIVE
BNP SERPL-MCNC: 77 PG/ML (ref 0–125)
BUN SERPL-MCNC: 15 MG/DL (ref 6–20)
BUN/CREAT SERPL: 13 (ref 12–20)
CALCIUM SERPL-MCNC: 8.9 MG/DL (ref 8.5–10.1)
CALCULATED R AXIS, ECG10: 46 DEGREES
CALCULATED T AXIS, ECG11: 45 DEGREES
CHLORIDE SERPL-SCNC: 106 MMOL/L (ref 97–108)
CO2 SERPL-SCNC: 26 MMOL/L (ref 21–32)
COLOR UR: ABNORMAL
COMMENT, HOLDF: NORMAL
CREAT SERPL-MCNC: 1.16 MG/DL (ref 0.7–1.3)
DIAGNOSIS, 93000: NORMAL
DIFFERENTIAL METHOD BLD: ABNORMAL
EOSINOPHIL # BLD: 0.1 K/UL (ref 0–0.4)
EOSINOPHIL NFR BLD: 1 % (ref 0–7)
EPITH CASTS URNS QL MICRO: ABNORMAL /LPF
ERYTHROCYTE [DISTWIDTH] IN BLOOD BY AUTOMATED COUNT: 14.3 % (ref 11.5–14.5)
GLOBULIN SER CALC-MCNC: 3.5 G/DL (ref 2–4)
GLUCOSE SERPL-MCNC: 104 MG/DL (ref 65–100)
GLUCOSE UR STRIP.AUTO-MCNC: NEGATIVE MG/DL
HCT VFR BLD AUTO: 37.3 % (ref 36.6–50.3)
HGB BLD-MCNC: 11.7 G/DL (ref 12.1–17)
HGB UR QL STRIP: NEGATIVE
IMM GRANULOCYTES # BLD: 0 K/UL (ref 0–0.04)
IMM GRANULOCYTES NFR BLD AUTO: 0 % (ref 0–0.5)
KETONES UR QL STRIP.AUTO: ABNORMAL MG/DL
LEUKOCYTE ESTERASE UR QL STRIP.AUTO: NEGATIVE
LYMPHOCYTES # BLD: 1 K/UL (ref 0.8–3.5)
LYMPHOCYTES NFR BLD: 13 % (ref 12–49)
MAGNESIUM SERPL-MCNC: 1.8 MG/DL (ref 1.6–2.4)
MCH RBC QN AUTO: 28 PG (ref 26–34)
MCHC RBC AUTO-ENTMCNC: 31.4 G/DL (ref 30–36.5)
MCV RBC AUTO: 89.2 FL (ref 80–99)
MONOCYTES # BLD: 0.6 K/UL (ref 0–1)
MONOCYTES NFR BLD: 8 % (ref 5–13)
MUCOUS THREADS URNS QL MICRO: ABNORMAL /LPF
NEUTS SEG # BLD: 5.9 K/UL (ref 1.8–8)
NEUTS SEG NFR BLD: 78 % (ref 32–75)
NITRITE UR QL STRIP.AUTO: NEGATIVE
NRBC # BLD: 0 K/UL (ref 0–0.01)
NRBC BLD-RTO: 0 PER 100 WBC
PH UR STRIP: 6 [PH] (ref 5–8)
PLATELET # BLD AUTO: 185 K/UL (ref 150–400)
PMV BLD AUTO: 10.7 FL (ref 8.9–12.9)
POTASSIUM SERPL-SCNC: 3.7 MMOL/L (ref 3.5–5.1)
PROT SERPL-MCNC: 7.2 G/DL (ref 6.4–8.2)
PROT UR STRIP-MCNC: 30 MG/DL
Q-T INTERVAL, ECG07: 408 MS
QRS DURATION, ECG06: 92 MS
QTC CALCULATION (BEZET), ECG08: 467 MS
RBC # BLD AUTO: 4.18 M/UL (ref 4.1–5.7)
RBC #/AREA URNS HPF: ABNORMAL /HPF (ref 0–5)
SAMPLES BEING HELD,HOLD: NORMAL
SODIUM SERPL-SCNC: 145 MMOL/L (ref 136–145)
SP GR UR REFRACTOMETRY: 1.02 (ref 1–1.03)
TROPONIN I SERPL-MCNC: <0.05 NG/ML
TROPONIN I SERPL-MCNC: <0.05 NG/ML
TSH SERPL DL<=0.05 MIU/L-ACNC: 1.84 UIU/ML (ref 0.36–3.74)
UR CULT HOLD, URHOLD: NORMAL
UROBILINOGEN UR QL STRIP.AUTO: 1 EU/DL (ref 0.2–1)
VENTRICULAR RATE, ECG03: 79 BPM
WBC # BLD AUTO: 7.6 K/UL (ref 4.1–11.1)
WBC URNS QL MICRO: ABNORMAL /HPF (ref 0–4)

## 2018-12-27 PROCEDURE — 71045 X-RAY EXAM CHEST 1 VIEW: CPT

## 2018-12-27 PROCEDURE — 80053 COMPREHEN METABOLIC PANEL: CPT

## 2018-12-27 PROCEDURE — 81001 URINALYSIS AUTO W/SCOPE: CPT

## 2018-12-27 PROCEDURE — 99285 EMERGENCY DEPT VISIT HI MDM: CPT

## 2018-12-27 PROCEDURE — 84484 ASSAY OF TROPONIN QUANT: CPT

## 2018-12-27 PROCEDURE — 93005 ELECTROCARDIOGRAM TRACING: CPT

## 2018-12-27 PROCEDURE — 83735 ASSAY OF MAGNESIUM: CPT

## 2018-12-27 PROCEDURE — 36415 COLL VENOUS BLD VENIPUNCTURE: CPT

## 2018-12-27 PROCEDURE — 84443 ASSAY THYROID STIM HORMONE: CPT

## 2018-12-27 PROCEDURE — 85025 COMPLETE CBC W/AUTO DIFF WBC: CPT

## 2018-12-27 PROCEDURE — 83880 ASSAY OF NATRIURETIC PEPTIDE: CPT

## 2018-12-27 PROCEDURE — 70450 CT HEAD/BRAIN W/O DYE: CPT

## 2018-12-27 NOTE — DISCHARGE INSTRUCTIONS
Palpitations: Care Instructions  Your Care Instructions    Heart palpitations are the uncomfortable sensation that your heart is beating fast or irregularly. You might feel pounding or fluttering in your chest. It might feel like your heart is skipping a beat. Although palpitations may be caused by a heart problem, they also occur because of stress, fatigue, or use of alcohol, caffeine, or nicotine. Many medicines, including diet pills, antihistamines, decongestants, and some herbal products, can cause heart palpitations. Nearly everyone has palpitations from time to time. Depending on your symptoms, your doctor may need to do more tests to try to find the cause of your palpitations. Follow-up care is a key part of your treatment and safety. Be sure to make and go to all appointments, and call your doctor if you are having problems. It's also a good idea to know your test results and keep a list of the medicines you take. How can you care for yourself at home? · Avoid caffeine, nicotine, and excess alcohol. · Do not take illegal drugs, such as methamphetamines and cocaine. · Do not take weight loss or diet medicines unless you talk with your doctor first.  · Get plenty of sleep. · Do not overeat. · If you have palpitations again, take deep breaths and try to relax. This may slow a racing heart. · If you start to feel lightheaded, lie down to avoid injuries that might result if you pass out and fall down. · Keep a record of your palpitations and bring it to your next doctor's appointment. Write down:  ? The date and time. ? Your pulse. (If your heart is beating fast, it may be hard to count your pulse.)  ? What you were doing when the palpitations started. ? How long the palpitations lasted. ? Any other symptoms. · If an activity causes palpitations, slow down or stop. Talk to your doctor before you do that activity again. · Take your medicines exactly as prescribed.  Call your doctor if you think you are having a problem with your medicine. When should you call for help? Call 911 anytime you think you may need emergency care. For example, call if:    · You passed out (lost consciousness).     · You have symptoms of a heart attack. These may include:  ? Chest pain or pressure, or a strange feeling in the chest.  ? Sweating. ? Shortness of breath. ? Pain, pressure, or a strange feeling in the back, neck, jaw, or upper belly or in one or both shoulders or arms. ? Lightheadedness or sudden weakness. ? A fast or irregular heartbeat. After you call 911, the  may tell you to chew 1 adult-strength or 2 to 4 low-dose aspirin. Wait for an ambulance. Do not try to drive yourself.     · You have symptoms of a stroke. These may include:  ? Sudden numbness, tingling, weakness, or loss of movement in your face, arm, or leg, especially on only one side of your body. ? Sudden vision changes. ? Sudden trouble speaking. ? Sudden confusion or trouble understanding simple statements. ? Sudden problems with walking or balance. ? A sudden, severe headache that is different from past headaches.    Call your doctor now or seek immediate medical care if:    · You have heart palpitations and:  ? Are dizzy or lightheaded, or you feel like you may faint. ? Have new or increased shortness of breath.    Watch closely for changes in your health, and be sure to contact your doctor if:    · You continue to have heart palpitations. Where can you learn more? Go to http://kris-noman.info/. Enter R508 in the search box to learn more about \"Palpitations: Care Instructions. \"  Current as of: December 6, 2017  Content Version: 11.8  © 8765-3006 [employer]. Care instructions adapted under license by [employer] (which disclaims liability or warranty for this information).  If you have questions about a medical condition or this instruction, always ask your healthcare professional. [employer] disclaims any warranty or liability for your use of this information.

## 2018-12-27 NOTE — ED TRIAGE NOTES
Patient c/o an episode after awakening this morning feeling like he was in \"the zone\". Patient states he felt \"woozy, weak all over, sweating, and shaky\". Patient states it lasted about 30minutes. Patient states he was concerned he was having a heart attack.

## 2018-12-27 NOTE — ED PROVIDER NOTES
76 y.o. male with past medical history significant for diabetes, hypercholesterolemia, HTN, hernia, sleep apnea, BPH, obesity, iron deficiency anemia, COPD, and atrial fibrillation who presents from home with chief complaint of fatigue. Patient states that he woke up early this morning to go deer hunting. While stopping at a [employer] this morning, patient claims that he \"zoned out\" and was forced to sit in his truck for 30 minutes outside of the store. He recalls that he \"knew where he was\" but did not think that he \"could get up. \"  He complains of diaphoresis at that time as well as a \"whoozy\" and dizzy feeling. He adds that his hands were shaking and that his heart was \"quivering\" and racing. He mentions having associated fatigue and generalized weakness. He also notes that he had dry mouth during this episode. Patient states that he drove home once he felt better. Of note, patient admits to previous h/o atrial fibrillation. He states that he went into \"a-fib 13 times when evaluated for a month with a monitor. \"  He takes Eliquis for his a-fib. Patient adds that he has a hiatal hernia that has not yet been repaired and has a colonoscopy scheduled for next month. Patient denies taking medications that make him feel weak or dizzy often. He does note, however, that he experiences dizziness occasionally. Patient has previously visited the ED on 5/1/18 for chest pain and paroxysmal a-fib. Patient denies chest pain, SOB, vision changes, headache, nausea, and vomiting. There are no other acute medical concerns at this time. Social hx: former tobacco use; positive alcohol use (patient states that he has 2 to 4 single mixed drinks every evening); negative drug use   PCP: Tracy Kan MD    Note written by Travis Mishra, as dictated by Zachariah Jara MD 7:55 AM        The history is provided by the patient. No  was used.         Past Medical History:   Diagnosis Date    Atrial fibrillation ([facility] [address]     Afib 18 - spont converted to NSR    BPH (benign prostatic hyperplasia)     COPD (chronic obstructive pulmonary disease) ([facility])     mild COPD - 30 pack year smoking     Diabetes mellitus     Hernia of unspecified site of abdominal cavity without mention of obstruction or gangrene     High cholesterol     Hypertension     Iron deficiency anemia     JW visit     Obesity     Sleep apnea     uses CPAP       Past Surgical History:   Procedure Laterality Date    RECTUM SURGERY PROCEDURE UNLISTED           Family History:   Problem Relation Age of Onset    Stroke Mother        Social History     Socioeconomic History    Marital status:      Spouse name: Not on file    Number of children: Not on file    Years of education: Not on file    Highest education level: Not on file   Social Needs    Financial resource strain: Not on file    Food insecurity - worry: Not on file    Food insecurity - inability: Not on file   [employer] needs - medical: Not on file   [employer] needs - non-medical: Not on file   Occupational History    Not on file   Tobacco Use    Smoking status: Former Smoker     Last attempt to quit: 6/3/1990     Years since quittin.5    Smokeless tobacco: Never Used   Substance and Sexual Activity    Alcohol use: Yes     Comment: 3 drinks per night    Drug use: No    Sexual activity: Not on file   Other Topics Concern    Not on file   Social History Narrative    Not on file         ALLERGIES: Patient has no known allergies. Review of Systems   Constitutional: Positive for diaphoresis and fatigue. Negative for activity change, chills and fever. HENT: Negative for nosebleeds, sore throat, trouble swallowing and voice change. Dry mouth   Eyes: Negative for visual disturbance. Respiratory: Negative for chest tightness and shortness of breath.     Cardiovascular: Positive for palpitations (\"heart racing and quivering\"). Negative for chest pain. Gastrointestinal: Negative for abdominal pain, constipation, diarrhea, nausea and vomiting. Genitourinary: Negative for difficulty urinating, dysuria, hematuria and urgency. Musculoskeletal: Negative for back pain and neck stiffness. Skin: Negative for color change. Allergic/Immunologic: Negative for immunocompromised state. Neurological: Positive for dizziness, tremors (\"hands shaking\") and weakness (generalized). Negative for seizures, syncope, numbness and headaches. Psychiatric/Behavioral: Negative for behavioral problems, confusion, hallucinations, self-injury and suicidal ideas. All other systems reviewed and are negative. There were no vitals filed for this visit. Physical Exam   Constitutional: He is oriented to person, place, and time. He appears well-developed and well-nourished. No distress. HENT:   Head: Normocephalic and atraumatic. Eyes: Pupils are equal, round, and reactive to light. Neck: Normal range of motion. Neck supple. Cardiovascular: Normal rate and normal heart sounds. An irregular rhythm present. Exam reveals no gallop and no friction rub. No murmur heard. Pulmonary/Chest: Effort normal and breath sounds normal. No respiratory distress. He has no wheezes. Abdominal: Soft. Bowel sounds are normal. He exhibits no distension. There is no tenderness. There is no rebound and no guarding. Musculoskeletal: Normal range of motion. Neurological: He is alert and oriented to person, place, and time. Skin: Skin is warm. No rash noted. He is not diaphoretic. Psychiatric: He has a normal mood and affect. His behavior is normal. Judgment and thought content normal.   Nursing note and vitals reviewed.      Note written by Travis hPan, as dictated by Carmelo Hall MD 7:55 AM      MDM     This is a 71-year-old male with past medical history, review of systems, physical exam as above, presenting with complaints of a 30 minute episode approximately 2 hours prior to arrival, where he describes \"being in a zone\" further described as disorientation, that resolved spontaneously. The patient endorses waking in his usual state of health, dry mouth this morning, without other symptoms such as chest pain, shortness of breath, nausea or vomiting, focal weakness, or paresthesia. He does endorse an episode of palpitations during this period. Patient denies recent illness. Physical exam is remarkable for well-appearing male, in no acute distress, with nonfocal neurologic exam, clear breath sounds, nontender abdomen, regular rate, irregular rhythm without murmurs gallops or rubs. Differential includes electrolyte abnormality, dehydration, arrhythmia, CVA, versus TIA. Patient's exam is reassuring, no report of focal neurologic signs during this episode, less likely to be CNS in nature. Plan to obtain CMP, CBC, EKG chest x-ray, cardiac enzymes, magnesium, BNP, head CT. We will reevaluate, and make a disposition based on the patient's diagnostics and response to therapy. Procedures    ED EKG interpretation:  Rhythm: atrial fib;  Rate (approx.): 79 bpm;  ST/T wave: No ST changes; Upright T waves; No signs of ischemia; Note written by Travis Clark, as dictated by Trena Carnes MD 7:54 AM      PROGRESS NOTE:  11:14 AM  No acute changes in labwork. 2 negative troponin tests. Patient remains asymptomatic. Will discharge home with PCP and cardiology follow-up. Return precautions given. 11:15 AM  Patient's results have been reviewed with them. Patient and/or family have verbally conveyed their understanding and agreement of the patient's signs, symptoms, diagnosis, treatment and prognosis and additionally agree to follow up as recommended or return to the Emergency Room should their condition change prior to follow-up.   Discharge instructions have also been provided to the patient with some educational information regarding their diagnosis as well a list of reasons why they would want to return to the ER prior to their follow-up appointment should their condition change.

## 2018-12-31 ENCOUNTER — CLINICAL SUPPORT (OUTPATIENT)
Dept: CARDIOLOGY CLINIC | Age: 68
End: 2018-12-31

## 2018-12-31 DIAGNOSIS — I48.0 PAROXYSMAL ATRIAL FIBRILLATION (HCC): ICD-10-CM

## 2019-01-09 ENCOUNTER — TELEPHONE (OUTPATIENT)
Dept: CARDIOLOGY CLINIC | Age: 69
End: 2019-01-09

## 2019-01-09 NOTE — TELEPHONE ENCOUNTER
Patient stated [facility] will be sending over a tier reduction  For for his eliquis he wanted to be sure we got it and we are working on it   Phone: [phone]

## 2019-01-10 ENCOUNTER — DOCUMENTATION ONLY (OUTPATIENT)
Dept: CARDIOLOGY CLINIC | Age: 69
End: 2019-01-10

## 2019-01-10 ENCOUNTER — OFFICE VISIT (OUTPATIENT)
Dept: CARDIOLOGY CLINIC | Age: 69
End: 2019-01-10

## 2019-01-10 VITALS
HEIGHT: 70 IN | DIASTOLIC BLOOD PRESSURE: 64 MMHG | WEIGHT: 256 LBS | HEART RATE: 68 BPM | BODY MASS INDEX: 36.65 KG/M2 | RESPIRATION RATE: 16 BRPM | SYSTOLIC BLOOD PRESSURE: 150 MMHG

## 2019-01-10 DIAGNOSIS — I10 ESSENTIAL HYPERTENSION: ICD-10-CM

## 2019-01-10 DIAGNOSIS — I48.0 PAROXYSMAL ATRIAL FIBRILLATION (HCC): Primary | ICD-10-CM

## 2019-01-10 PROBLEM — E66.01 SEVERE OBESITY (HCC): Status: ACTIVE | Noted: 2019-01-10

## 2019-01-10 RX ORDER — FLECAINIDE ACETATE 100 MG/1
100 TABLET ORAL 2 TIMES DAILY
Qty: 180 TAB | Refills: 3 | Status: SHIPPED | OUTPATIENT
Start: 2019-01-10 | End: 2021-11-01 | Stop reason: SDUPTHER

## 2019-01-10 RX ORDER — LOSARTAN POTASSIUM 100 MG/1
TABLET ORAL
Qty: 30 TAB | Refills: 6
Start: 2019-01-10 | End: 2019-02-21

## 2019-01-10 NOTE — PROGRESS NOTES
Kerry Dc MD. [facility] [place]              Patient: Aylin Ward  : 1950      Today's Date: 1/10/2019            HISTORY OF PRESENT ILLNESS:     History of Present Illness:  Here for FU. He was in the ER on 18 - felt weak in a zone for 30 min - lorraine to ER - felt fine, but EKG showed Afib. Wearing heart monitor since then. He feels fine now. Played golf Tuesday. No CP or SOB past week. PAST MEDICAL HISTORY:     Past Medical History:   Diagnosis Date    Atrial fibrillation ([address]     Afib 18 - spont converted to NSR    BPH (benign prostatic hyperplasia)     COPD (chronic obstructive pulmonary disease) ([facility])     mild COPD - 30 pack year smoking     Diabetes mellitus     Hernia of unspecified site of abdominal cavity without mention of obstruction or gangrene     High cholesterol     Hypertension     Iron deficiency anemia     JW visit     Obesity     Sleep apnea     uses CPAP       Past Surgical History:   Procedure Laterality Date    RECTUM SURGERY PROCEDURE UNLISTED             MEDICATIONS:     Current Outpatient Medications   Medication Sig Dispense Refill    losartan (COZAAR) 100 mg tablet Take half a tablet 30 Tab 6    flecainide (TAMBOCOR) 50 mg tablet Take 50 mg by mouth two (2) times a day.  dilTIAZem CD (CARDIZEM CD) 300 mg ER capsule Take 1 Cap by mouth daily. 90 Cap 2    SPIRIVA RESPIMAT 2.5 mcg/actuation inhaler 2 Puffs daily.  apixaban (ELIQUIS) 5 mg tablet Take 1 Tab by mouth two (2) times a day. 56 Tab 0    sertraline (ZOLOFT) 100 mg tablet Take 50 mg by mouth daily.  cetirizine HCl (ALLER-BOSTON PO) Take  by mouth two (2) times daily as needed.  montelukast (SINGULAIR) 10 mg tablet Take 10 mg by mouth daily.  nicotinic acid (NIACIN) 500 mg tablet Take 500 mg by mouth Daily (before breakfast).  raNITIdine (ZANTAC) 150 mg tablet Take 150 mg by mouth two (2) times a day.       iron polysaccharide complex (POLY-IRON PO) Take 150 mg by mouth two (2) times a day.  insulin NPH human isophane (NOVOLIN N SC) 30 Units by SubCUTAneous route two (2) times a day.  ascorbic acid, vitamin C, (VITAMIN C) 500 mg tablet Take  by mouth every seven (7) days.  ergocalciferol (ERGOCALCIFEROL) 50,000 unit capsule Take 50,000 Units by mouth every seven (7) days. Indications: PREVENTION OF VITAMIN D DEFICIENCY      cyanocobalamin 1,000 mcg tablet Take 1,000 mcg by mouth every seven (7) days. Indications: PREVENTION OF VITAMIN B12 DEFICIENCY      albuterol (PROAIR HFA) 90 mcg/actuation inhaler Take  by inhalation as needed.  metFORMIN (GLUMETZA) 1,000 mg TG24 24 hour tablet Take 1,500 mg by mouth two (2) times a day.  omeprazole (PRILOSEC) 40 mg capsule Take 40 mg by mouth two (2) times a day.  tamsulosin (FLOMAX) 0.4 mg capsule Take 0.4 mg by mouth two (2) times a day.  rosuvastatin (CRESTOR) 40 mg tablet Take 40 mg by mouth daily. NON FORMULARY       omega-3 fatty acids-vitamin e (FISH OIL) 1,000 mg Cap Take 1 Cap by mouth daily.  sodium chloride (SALINE NASAL NA) by Nasal route as needed. No Known Allergies        SOCIAL HISTORY:     Social History     Tobacco Use    Smoking status: Former Smoker     Last attempt to quit: 6/3/1990     Years since quittin.6    Smokeless tobacco: Never Used   Substance Use Topics    Alcohol use: Yes     Comment: 3 drinks per night    Drug use: No         FAMILY HISTORY:     Family History   Problem Relation Age of Onset    Stroke Mother                 REVIEW OF SYMPTOMS:       Review of Symptoms:  Constitutional: Negative for fever, chills  HEENT: Negative for nosebleeds, tinnitus, and vision changes. Respiratory: Negative for cough, wheezing  +[place]  Cardiovascular: Negative for orthopnea, claudication, syncope, and PND. Gastrointestinal: Negative for abdominal pain, diarrhea, melena.    Genitourinary: Negative for dysuria  Musculoskeletal: Negative for myalgias. Skin: Negative for rash  Heme: No problems bleeding recently   Neurological: Negative for speech change and focal weakness.               PHYSICAL EXAM:       Physical Exam:  Visit Vitals  /64 (BP 1 Location: Right arm, BP Patient Position: Sitting)   Pulse 68   Resp 16   Ht 5' 10\" (1.778 m)   Wt 256 lb (116.1 kg)   BMI 36.73 kg/m²        Patient appears generally well, mood and affect are appropriate and pleasant. HEENT:  Hearing intact, non-icteric, normocephalic, atraumatic. Neck Exam: Supple, No JVD or carotid bruits. Lung Exam: Clear to auscultation, even breath sounds. Cardiac Exam: Regular rate and rhythm with 3/6 systolic murmur  Abdomen: Soft, non-tender, normal bowel sounds. No bruits or masses. Extremities: Moves all ext well. No lower extremity edema. Psych: Appropriate affect  Neuro - Grossly intact              LABS / OTHER STUDIES:         Lab Results   Component Value Date/Time    Sodium 145 12/27/2018 08:02 AM    Potassium 3.7 12/27/2018 08:02 AM    Chloride 106 12/27/2018 08:02 AM    CO2 26 12/27/2018 08:02 AM    Anion gap 13 12/27/2018 08:02 AM    Glucose 104 (H) 12/27/2018 08:02 AM    BUN 15 12/27/2018 08:02 AM    Creatinine 1.16 12/27/2018 08:02 AM    BUN/Creatinine ratio 13 12/27/2018 08:02 AM    GFR est AA >60 12/27/2018 08:02 AM    GFR est non-AA >60 12/27/2018 08:02 AM    Calcium 8.9 12/27/2018 08:02 AM    Bilirubin, total 0.2 12/27/2018 08:02 AM    AST (SGOT) 23 12/27/2018 08:02 AM    Alk.  phosphatase 71 12/27/2018 08:02 AM    Protein, total 7.2 12/27/2018 08:02 AM    Albumin 3.7 12/27/2018 08:02 AM    Globulin 3.5 12/27/2018 08:02 AM    A-G Ratio 1.1 12/27/2018 08:02 AM    ALT (SGPT) 26 12/27/2018 08:02 AM     Lab Results   Component Value Date/Time    WBC 7.6 12/27/2018 08:02 AM    HGB 11.7 (L) 12/27/2018 08:02 AM    HCT 37.3 12/27/2018 08:02 AM    PLATELET 171 58/99/5906 08:02 AM    MCV 89.2 12/27/2018 08:02 AM       Lab Results   Component Value Date/Time Cholesterol, total 113 12/12/2011 06:15 AM    HDL Cholesterol 37 12/12/2011 06:15 AM    LDL, calculated 11.8 12/12/2011 06:15 AM    VLDL, calculated 64.2 12/12/2011 06:15 AM    Triglyceride 321 (H) 12/12/2011 06:15 AM    CHOL/HDL Ratio 3.1 12/12/2011 06:15 AM           Labs 2/5/16 - Iron 52, ferritin 20, Hgb 12.8  Labs 2/27/18 - A1c 7.2,ferritin 29, iron 77, Hgb 13.4, plt 187, chol 140, , LDL 63, HDL 42, CMP OK,                  CARDIAC DIAGNOSTICS:       Cardiac Evaluation Includes:  Exercise cardiolite 2011 - 1. Objectively normal treadmill stress test (patient reached only 79% of  maximal predicted heart rate, thus decreasing the sensitivity of the test). His max workload was 9.1 METS. 2. Atypical, mild chest pain.  3. Normal exercise gated myocardial perfusion study without inducible  Ischemia. 4. Normal systolic function with an left ventricular ejection fraction of   77%.    Echo 5/21/18 - LVEF 60%.  RV normal.  Mild MR.  AV sclerosis   Lexiscan Cardiolite 5/21/18 - normal MPI, LVEF 68%. Event Monitor 10/2/18 - Afib at at 6:30 PM (-110) and at 7 PM (HR 80-90 bpm)          EKG 5/1/18 - Afib with RVR,   EKG 5/1/18 - NSR, normal   EKG 12/10/18 - NSR, normal  EKG 12/27/18 - Afib,. HR 79                  ASSESSMENT AND PLAN:       Assessment and Plan:  1) PAF  - Afib 5/1/18 on ER presentation - converted to NSR on his own  - He had 10 episodes of symptoms prior then   - [id number] score is 3 (has 5% stroke risk per year)   - Continue Eliquis 5 mg BID   - Event Monitor 10/2/18 - Afib at at 6:30 PM (-110) and at 7 PM (HR 80-90 bpm)  - Eventually increased Cardizem and added flecainide to manage Afib. He has seen Dr. Kelly Lennon   - On 12/27/18 he was in ER for weakness - was in Afib then - doing fine since then --> wearing a heart monitor now   - On 1/10/19 - feels well. HR is regular. Will increase flecainide to 100 mg BID to help prevent Afib (EKG check 3 days).      2) History of chest pain   - he has many CAD risk factors   - Lexiscan Cardiolite 5/21/18 - normal MPI, LVEF 68%. - Symptoms got better with Tums and is now playing golf without chest pain       3) HTN  - BP mildly high   - Will work on diet to help lower BP  - Will consider med changes later       4) Dyslipidemia  - on Crestor   - prior lipids OK       5) Anemia  - Records from Dr. Alie Clark reviewed - her note states that patient had iron deficiency anemia - but had negative GI workup with Dr. Terence Freitas and had multiple stools that were negative heme. - hgb has been stable on iron   - follow CBC on Eliquis      6) See me in 1 month.   Patient expressed understanding of the plan - questions were answered.      Vacationed in [employer].  Is retired.   Jeannine Jeffery MD, [facility] [address], [address]                [id number] [place] [address]. [address] [facility], [facility] [address] [facility]  Ph: [phone]                                                             [phone]        ADDENDUM   1/31/2019  Event Monitor 1/7/19-1/20/19 - Had multiple episodes of Afib with RVR; Did have Afib with -130 on 1/11, 1/17, and 1/19. Will have nurse call. Will try and add metoprolol 25 mg BID to his regimen if he is agreeable. Will see him in a couple of weeks as planned for further management.

## 2019-01-10 NOTE — PROGRESS NOTES
Faxed completed renewal application for [place] for pt's eliquis [place] [facility]

## 2019-01-10 NOTE — PROGRESS NOTES
Chief Complaint   Patient presents with    Hypertension    Irregular Heart Beat     PAF     Visit Vitals  /64 (BP 1 Location: Right arm, BP Patient Position: Sitting)   Pulse 68   Resp 16   Ht 5' 10\" (1.778 m)   Wt 256 lb (116.1 kg)   BMI 36.73 kg/m²

## 2019-01-18 ENCOUNTER — CLINICAL SUPPORT (OUTPATIENT)
Dept: CARDIOLOGY CLINIC | Age: 69
End: 2019-01-18

## 2019-01-18 DIAGNOSIS — I48.91 ATRIAL FIBRILLATION, UNSPECIFIED TYPE (HCC): Primary | ICD-10-CM

## 2019-01-22 ENCOUNTER — TELEPHONE (OUTPATIENT)
Dept: CARDIOLOGY CLINIC | Age: 69
End: 2019-01-22

## 2019-01-22 NOTE — TELEPHONE ENCOUNTER
----- Message from Celina Gee MD sent at 1/19/2019  2:35 AM EST -----  Regarding: please let patient know  EKG looks fine - NSR and QTc OK - will have my nurse let patient know.     Thanks,  SK

## 2019-02-01 ENCOUNTER — TELEPHONE (OUTPATIENT)
Dept: CARDIOLOGY CLINIC | Age: 69
End: 2019-02-01

## 2019-02-01 RX ORDER — METOPROLOL TARTRATE 25 MG/1
25 TABLET, FILM COATED ORAL 2 TIMES DAILY
Qty: 60 TAB | Refills: 3 | Status: SHIPPED | OUTPATIENT
Start: 2019-02-01 | End: 2019-02-01 | Stop reason: SDUPTHER

## 2019-02-01 NOTE — TELEPHONE ENCOUNTER
----- Message from Celina Gee MD sent at 1/31/2019 11:28 PM EST -----  Regarding: please call patient  Please call. Event Monitor 1/7/19-1/20/19 - Had multiple episodes of Afib with RVR; Did have Afib with -130 on 1/11, 1/17, and 1/19. Will have nurse call. Will try and add metoprolol 25 mg BID to his regimen if he is agreeable. Will see him in a couple of weeks as planned for further management.        Thanks,  SK

## 2019-02-03 RX ORDER — METOPROLOL TARTRATE 25 MG/1
TABLET, FILM COATED ORAL
Qty: 180 TAB | Refills: 3 | Status: SHIPPED | OUTPATIENT
Start: 2019-02-03 | End: 2019-02-21

## 2019-02-21 ENCOUNTER — OFFICE VISIT (OUTPATIENT)
Dept: CARDIOLOGY CLINIC | Age: 69
End: 2019-02-21

## 2019-02-21 VITALS
WEIGHT: 256.2 LBS | DIASTOLIC BLOOD PRESSURE: 60 MMHG | OXYGEN SATURATION: 98 % | HEART RATE: 54 BPM | BODY MASS INDEX: 36.68 KG/M2 | HEIGHT: 70 IN | SYSTOLIC BLOOD PRESSURE: 120 MMHG

## 2019-02-21 DIAGNOSIS — I48.91 ATRIAL FIBRILLATION, UNSPECIFIED TYPE (HCC): Primary | ICD-10-CM

## 2019-02-21 DIAGNOSIS — I10 HYPERTENSION, UNSPECIFIED TYPE: ICD-10-CM

## 2019-02-21 DIAGNOSIS — D50.9 IRON DEFICIENCY ANEMIA, UNSPECIFIED IRON DEFICIENCY ANEMIA TYPE: ICD-10-CM

## 2019-02-21 NOTE — PROGRESS NOTES
Patient has shortness of breath occasionally Patient says he gets lightheaded occasionally Visit Vitals /60 (BP 1 Location: Right arm, BP Patient Position: Sitting) Pulse (!) 54 Ht 5' 10\" (1.778 m) Wt 256 lb 3.2 oz (116.2 kg) SpO2 98% BMI 36.76 kg/m²

## 2019-02-21 NOTE — PROGRESS NOTES
Kerry Dc MD. [facility] Patient: Aylin Ward : 1950 Today's Date: 2019 HISTORY OF PRESENT ILLNESS:  
 
History of Present Illness: 
Here for follow-up. Last Afib spell was probably 18. No HR sensations since then. Feels pretty goof past month. PAST MEDICAL HISTORY:  
 
Past Medical History:  
Diagnosis Date  Atrial fibrillation ([address] Afib 18 - spont converted to NSR  
 BPH (benign prostatic hyperplasia)  COPD (chronic obstructive pulmonary disease) (HCC) mild COPD - 30 pack year smoking  Diabetes mellitus  Hernia of unspecified site of abdominal cavity without mention of obstruction or gangrene  High cholesterol  Hypertension  Iron deficiency anemia [facility] visit  Obesity  Sleep apnea   
 uses CPAP Past Surgical History:  
Procedure Laterality Date  RECTUM SURGERY PROCEDURE UNLISTED MEDICATIONS:  
 
Current Outpatient Medications Medication Sig Dispense Refill  losartan (COZAAR) 100 mg tablet Take half a tablet 30 Tab 6  flecainide (TAMBOCOR) 100 mg tablet Take 1 Tab by mouth two (2) times a day. 180 Tab 3  
 dilTIAZem CD (CARDIZEM CD) 300 mg ER capsule Take 1 Cap by mouth daily. 90 Cap 2  
 sodium chloride (SALINE NASAL NA) by Nasal route as needed.  SPIRIVA RESPIMAT 2.5 mcg/actuation inhaler 2 Puffs daily.  apixaban (ELIQUIS) 5 mg tablet Take 1 Tab by mouth two (2) times a day. 56 Tab 0  
 sertraline (ZOLOFT) 100 mg tablet Take 50 mg by mouth daily.  cetirizine HCl (ALLER-BOSTON PO) Take  by mouth two (2) times daily as needed.  montelukast (SINGULAIR) 10 mg tablet Take 10 mg by mouth daily.  nicotinic acid (NIACIN) 500 mg tablet Take 500 mg by mouth Daily (before breakfast).  raNITIdine (ZANTAC) 150 mg tablet Take 150 mg by mouth two (2) times a day.     
 iron polysaccharide complex (POLY-IRON PO) Take 150 mg by mouth two (2) times a day.  insulin NPH human isophane (NOVOLIN N SC) 30 Units by SubCUTAneous route two (2) times a day.  ascorbic acid, vitamin C, (VITAMIN C) 500 mg tablet Take  by mouth every seven (7) days.  ergocalciferol (ERGOCALCIFEROL) 50,000 unit capsule Take 50,000 Units by mouth every seven (7) days. Indications: PREVENTION OF VITAMIN D DEFICIENCY  cyanocobalamin 1,000 mcg tablet Take 1,000 mcg by mouth every seven (7) days. Indications: PREVENTION OF VITAMIN B12 DEFICIENCY    
 albuterol (PROAIR HFA) 90 mcg/actuation inhaler Take  by inhalation as needed.  metFORMIN (GLUMETZA) 1,000 mg TG24 24 hour tablet Take 1,500 mg by mouth two (2) times a day.  omeprazole (PRILOSEC) 40 mg capsule Take 40 mg by mouth two (2) times a day.  tamsulosin (FLOMAX) 0.4 mg capsule Take 0.4 mg by mouth two (2) times a day.  rosuvastatin (CRESTOR) 40 mg tablet Take 40 mg by mouth daily. NON FORMULARY  omega-3 fatty acids-vitamin e (FISH OIL) 1,000 mg Cap Take 1 Cap by mouth daily. No Known Allergies SOCIAL HISTORY:  
 
Social History Tobacco Use  Smoking status: Former Smoker Last attempt to quit: 6/3/1990 Years since quittin.7  Smokeless tobacco: Never Used Substance Use Topics  Alcohol use: Yes Comment: 3 drinks per night  Drug use: No  
 
 
 
FAMILY HISTORY:  
 
Family History Problem Relation Age of Onset  Stroke Mother   
 
 
 
  
REVIEW OF SYMPTOMS:  
   
Review of Symptoms: 
Constitutional: Negative for fever, chills HEENT: Negative for nosebleeds, tinnitus, and vision changes. Respiratory: Negative for cough, wheezing  +AUSTIN Cardiovascular: Negative for orthopnea, claudication, syncope, and PND. Gastrointestinal: Negative for abdominal pain, diarrhea, melena. Genitourinary: Negative for dysuria Musculoskeletal: Negative for myalgias. Skin: Negative for rash Heme: No problems bleeding recently Neurological: Negative for speech change and focal weakness.  
   
   
   
PHYSICAL EXAM:  
   
Physical Exam: 
Visit Vitals /60 (BP 1 Location: Right arm, BP Patient Position: Sitting) Pulse (!) 54 Ht 5' 10\" (1.778 m) Wt 256 lb 3.2 oz (116.2 kg) SpO2 98% BMI 36.76 kg/m²  
 
 
  
Patient appears generally well, mood and affect are appropriate and pleasant. HEENT:  Hearing intact, non-icteric, normocephalic, atraumatic. Neck Exam: Supple, No JVD Lung Exam: Clear to auscultation, even breath sounds. Cardiac Exam: Regular rate and rhythm with 2/6 systolic murmur Abdomen: Soft, non-tender, normal bowel sounds. + obese Extremities: Moves all ext well. No lower extremity edema. Psych: Appropriate affect Neuro - Grossly intact    
   
   
LABS / OTHER STUDIES:  
   
  
     
Lab Results Component Value Date/Time  
  Sodium 145 12/27/2018 08:02 AM  
  Potassium 3.7 12/27/2018 08:02 AM  
  Chloride 106 12/27/2018 08:02 AM  
  CO2 26 12/27/2018 08:02 AM  
  Anion gap 13 12/27/2018 08:02 AM  
  Glucose 104 (H) 12/27/2018 08:02 AM  
  BUN 15 12/27/2018 08:02 AM  
  Creatinine 1.16 12/27/2018 08:02 AM  
  BUN/Creatinine ratio 13 12/27/2018 08:02 AM  
  GFR est AA >60 12/27/2018 08:02 AM  
  GFR est non-AA >60 12/27/2018 08:02 AM  
  Calcium 8.9 12/27/2018 08:02 AM  
  Bilirubin, total 0.2 12/27/2018 08:02 AM  
  AST (SGOT) 23 12/27/2018 08:02 AM  
  Alk. phosphatase 71 12/27/2018 08:02 AM  
  Protein, total 7.2 12/27/2018 08:02 AM  
  Albumin 3.7 12/27/2018 08:02 AM  
  Globulin 3.5 12/27/2018 08:02 AM  
  A-G Ratio 1.1 12/27/2018 08:02 AM  
  ALT (SGPT) 26 12/27/2018 08:02 AM  
  
     
Lab Results Component Value Date/Time  
  WBC 7.6 12/27/2018 08:02 AM  
  HGB 11.7 (L) 12/27/2018 08:02 AM  
  HCT 37.3 12/27/2018 08:02 AM  
  PLATELET 504 28/27/8427 08:02 AM  
  MCV 89.2 12/27/2018 08:02 AM  
  
  
     
Lab Results Component Value Date/Time  
  Cholesterol, total 113 12/12/2011 06:15 AM  
   HDL Cholesterol 37 12/12/2011 06:15 AM  
  LDL, calculated 11.8 12/12/2011 06:15 AM  
  VLDL, calculated 64.2 12/12/2011 06:15 AM  
  Triglyceride 321 (H) 12/12/2011 06:15 AM  
  CHOL/HDL Ratio 3.1 12/12/2011 06:15 AM  
  
   
  
Labs 2/5/16 - Iron 52, ferritin 20, Hgb 12.8 Labs 2/27/18 - A1c 7.2,ferritin 29, iron 77, Hgb 13.4, plt 187, chol 140, , LDL 63, HDL 42, CMP OK,  
   
  
   
   
CARDIAC DIAGNOSTICS:  
   
Cardiac Evaluation Includes: 
Exercise cardiolite 2011 - 1. Objectively normal treadmill stress test (patient reached only 79% of  maximal predicted heart rate, thus decreasing the sensitivity of the test). His max workload was 9.1 METS. 2. Atypical, mild chest pain.  3. Normal exercise gated myocardial perfusion study without inducible  Ischemia. 4. Normal systolic function with an left ventricular ejection fraction of   77%.   
Echo 5/21/18 - LVEF 60%.  RV normal.  Mild MR.  AV sclerosis Lexiscan Cardiolite 5/21/18 - normal MPI, LVEF 68%.  
  
Event Monitor 10/2/18 - Afib at at 6:30 PM (-110) and at 7 PM (HR 80-90 bpm)   Event Monitor 1/7/19-1/20/19 - Had multiple episodes of Afib with RVR; Did have Afib with -130 on 1/11, 1/17, and 1/19.  
    
  
EKG 5/1/18 - Afib with RVR,  EKG 5/1/18 - NSR, normal  
EKG 12/10/18 - NSR, normal 
EKG 12/27/18 - Afib,. HR 79 EKG 1/18/19 - NSR, normal.  HR 68. EKG 2/21/19 - NSR, normal  
   
   
   
   
ASSESSMENT AND PLAN:  
   
Assessment and Plan: 
1) PAF 
- Afib 5/1/18 on ER presentation - converted to NSR on his own 
- He had 10 episodes of symptoms prior then - [id number] score is 3 (has 5% stroke risk per year) - Continue Eliquis 5 mg BID  
- Event Monitor 10/2/18 - Afib at at 6:30 PM (-110) and at 7 PM (HR 80-90 bpm) - Eventually increased Cardizem and added flecainide to manage Afib. He has seen Dr. Cecilia Miller - On 12/27/18 he was in ER for weakness - was in Afib then - On 1/10/19 - feels well. HR is regular. Will increase flecainide to 100 mg BID to help prevent Afib (EKG check 3 days). - Event Monitor 1/7/19-1/20/19 - Had multiple episodes of Afib with RVR; Did have Afib with -130 on 1/11, 1/17, and 1/19.  
- On 2/21/19 - No symptomatic Afib since 12/27/18 but asymptomatic runs of Afib on his event monitor (see above). Will continue Cardizem and Flecainide. Will have him see Dr. Mariya Boyle for FU and further Afib management.   
2) History of chest pain  
- he has many CAD risk factors - Lexiscan Cardiolite 5/21/18 - normal MPI, LVEF 68%. - Symptoms got better with Tums and is now playing golf without chest pain - On 2/21/19 - No CP lately  
   
3) HTN 
- On 2/21/19 - BP running low at times - Will stop losartan for now - follow BP at home 
   
4) Dyslipidemia 
- on Crestor - prior lipids OK  
   
5) Anemia 
- Records from Dr. Daley Beams reviewed - her note states that patient had iron deficiency anemia - but had negative GI workup with Dr. Olga Plaza and had multiple stools that were negative heme. - hgb has been stable on iron  
- follow CBC on Eliquis   
6) See EP. See me in 6 month.   Patient expressed understanding of the plan - questions were answered.     
Vacationed in [address]  Is retired.  
Marshall Howe MD, [address], [address]                [id number] [facility], [address] [place] [address] Ph: [phone]                                                             [phone]

## 2019-03-02 LAB
BUN SERPL-MCNC: 15 MG/DL (ref 8–27)
BUN/CREAT SERPL: 15 (ref 10–24)
CALCIUM SERPL-MCNC: 9.2 MG/DL (ref 8.6–10.2)
CHLORIDE SERPL-SCNC: 106 MMOL/L (ref 96–106)
CO2 SERPL-SCNC: 23 MMOL/L (ref 20–29)
CREAT SERPL-MCNC: 0.98 MG/DL (ref 0.76–1.27)
ERYTHROCYTE [DISTWIDTH] IN BLOOD BY AUTOMATED COUNT: 17.2 % (ref 12.3–15.4)
GLUCOSE SERPL-MCNC: 121 MG/DL (ref 65–99)
HCT VFR BLD AUTO: 32.3 % (ref 37.5–51)
HGB BLD-MCNC: 10 G/DL (ref 13–17.7)
MCH RBC QN AUTO: 26.4 PG (ref 26.6–33)
MCHC RBC AUTO-ENTMCNC: 31 G/DL (ref 31.5–35.7)
MCV RBC AUTO: 85 FL (ref 79–97)
PLATELET # BLD AUTO: 213 X10E3/UL (ref 150–379)
POTASSIUM SERPL-SCNC: 4.9 MMOL/L (ref 3.5–5.2)
RBC # BLD AUTO: 3.79 X10E6/UL (ref 4.14–5.8)
SODIUM SERPL-SCNC: 144 MMOL/L (ref 134–144)
WBC # BLD AUTO: 5.2 X10E3/UL (ref 3.4–10.8)

## 2019-03-02 NOTE — PROGRESS NOTES
Please call patient and send him labs. Hgb has dropped from prior study. Please have him FU with his PCP for this.

## 2019-03-04 ENCOUNTER — TELEPHONE (OUTPATIENT)
Dept: CARDIOLOGY CLINIC | Age: 69
End: 2019-03-04

## 2019-03-04 NOTE — TELEPHONE ENCOUNTER
----- Message from Irlanda Goel MD sent at 3/2/2019  9:02 AM EST -----  Please call patient and send him labs. Hgb has dropped from prior study. Please have him FU with his PCP for this.

## 2019-03-08 ENCOUNTER — OFFICE VISIT (OUTPATIENT)
Dept: CARDIOLOGY CLINIC | Age: 69
End: 2019-03-08

## 2019-03-08 VITALS
SYSTOLIC BLOOD PRESSURE: 150 MMHG | HEART RATE: 56 BPM | BODY MASS INDEX: 36.94 KG/M2 | RESPIRATION RATE: 18 BRPM | HEIGHT: 70 IN | DIASTOLIC BLOOD PRESSURE: 70 MMHG | WEIGHT: 258 LBS | OXYGEN SATURATION: 95 %

## 2019-03-08 DIAGNOSIS — I48.91 ATRIAL FIBRILLATION, UNSPECIFIED TYPE (HCC): Primary | ICD-10-CM

## 2019-03-08 NOTE — PROGRESS NOTES
Room # 9  Referral Dr. Sarah Velez AFib  Visit Vitals  /70 (BP 1 Location: Left arm, BP Patient Position: Sitting)   Pulse (!) 56   Resp 18   Ht 5' 10\" (1.778 m)   Wt 258 lb (117 kg)   SpO2 95%   BMI 37.02 kg/m²

## 2019-03-08 NOTE — PROGRESS NOTES
HISTORY OF PRESENTING ILLNESS      Isabel Rios is a 71 y.o. male  with hypertension, diabetes, hyperlipidemia, obesity, COPD, PATRICIA and PAF who is referred for further management of atrial fibrillation. Previous echocardiogram demonstrated preserved LV function and stress testing failed to demonstrated ischemia. He favored a trial of drug therapy rather than ablation and thus was started on flecainide. His dose was increased to 100 mg twice daily after being noted to have episodes of asymptomatic rapid ventricular response on a monitor. He has been on Eliquis for CVA risk reduction however has had issues with anemia in the past for which workup has been unrevealing. His previous colonoscopies revealed polyps. He is also required holidays from Eliquis due to urinary tract infections. He recently presented to the emergency room for fatigue and was again noted to be anemic.        ACTIVE PROBLEM LIST     Patient Active Problem List    Diagnosis Date Noted    Severe obesity ([address] 01/10/2019    Atrial fibrillation (HCC)     COPD [facility]ronic obstructive [facility] disease) [facility]     Hypertension     Iron deficiency anemia     Chest pain, unspecified 12/12/2011    DM (diabetes mellitus) ([address] 12/12/2011    HTN (hypertension) 12/12/2011    Hyperlipidemia 12/12/2011    Obesity (BMI 30-39.9) 12/12/2011    Tobacco abuse 12/12/2011           PAST MEDICAL HISTORY     Past Medical History:   Diagnosis Date    Atrial fibrillation ([address]     Afib 5/1/18 - spont converted to NSR    BPH (benign prostatic hyperplasia)     COPD (chronic obstructive pulmonary disease) (HCC)     mild COPD - 30 pack year smoking     Diabetes mellitus     Hernia of unspecified site of abdominal cavity without mention of obstruction or gangrene     High cholesterol     Hypertension     Iron deficiency anemia     [facility] visit     Obesity     Sleep apnea     uses CPAP           PAST SURGICAL HISTORY     Past Surgical History: Procedure Laterality Date    RECTUM SURGERY PROCEDURE UNLISTED            ALLERGIES     No Known Allergies       FAMILY HISTORY     Family History   Problem Relation Age of Onset    Stroke Mother     negative for cardiac disease       SOCIAL HISTORY     Social History     Socioeconomic History    Marital status:      Spouse name: Not on file    Number of children: Not on file    Years of education: Not on file    Highest education level: Not on file   Tobacco Use    Smoking status: Former Smoker     Last attempt to quit: 6/3/1990     Years since quittin.7    Smokeless tobacco: Never Used   Substance and Sexual Activity    Alcohol use: Yes     Comment: 3 drinks per night    Drug use: No         MEDICATIONS     Current Outpatient Medications   Medication Sig    flecainide (TAMBOCOR) 100 mg tablet Take 1 Tab by mouth two (2) times a day.  dilTIAZem CD (CARDIZEM CD) 300 mg ER capsule Take 1 Cap by mouth daily.  sodium chloride (SALINE NASAL NA) by Nasal route as needed.  SPIRIVA RESPIMAT 2.5 mcg/actuation inhaler 2 Puffs daily.  apixaban (ELIQUIS) 5 mg tablet Take 1 Tab by mouth two (2) times a day.  sertraline (ZOLOFT) 100 mg tablet Take 50 mg by mouth daily.  cetirizine HCl (ALLER-BOSTON PO) Take  by mouth two (2) times daily as needed.  montelukast (SINGULAIR) 10 mg tablet Take 10 mg by mouth daily.  nicotinic acid (NIACIN) 500 mg tablet Take 500 mg by mouth Daily (before breakfast).  raNITIdine (ZANTAC) 150 mg tablet Take 150 mg by mouth two (2) times a day.  iron polysaccharide complex (POLY-IRON PO) Take 150 mg by mouth two (2) times a day.  insulin NPH human isophane (NOVOLIN N SC) 30 Units by SubCUTAneous route two (2) times a day.  ascorbic acid, vitamin C, (VITAMIN C) 500 mg tablet Take  by mouth every seven (7) days.  ergocalciferol (ERGOCALCIFEROL) 50,000 unit capsule Take 50,000 Units by mouth every seven (7) days.  Indications: PREVENTION OF VITAMIN D DEFICIENCY    cyanocobalamin 1,000 mcg tablet Take 1,000 mcg by mouth every seven (7) days. Indications: PREVENTION OF VITAMIN B12 DEFICIENCY    albuterol (PROAIR HFA) 90 mcg/actuation inhaler Take  by inhalation as needed.  metFORMIN (GLUMETZA) 1,000 mg TG24 24 hour tablet Take 1,500 mg by mouth two (2) times a day.  omeprazole (PRILOSEC) 40 mg capsule Take 40 mg by mouth two (2) times a day.  tamsulosin (FLOMAX) 0.4 mg capsule Take 0.4 mg by mouth two (2) times a day.  rosuvastatin (CRESTOR) 40 mg tablet Take 40 mg by mouth daily. NON FORMULARY     omega-3 fatty acids-vitamin e (FISH OIL) 1,000 mg Cap Take 1 Cap by mouth daily. No current facility-administered medications for this visit. I have reviewed the nurses notes, vitals, problem list, allergy list, medical history, family, social history and medications. REVIEW OF SYMPTOMS      General: Pt denies excessive weight gain or loss. Pt is able to conduct ADL's  HEENT: Denies blurred vision, headaches, hearing loss, epistaxis and difficulty swallowing. Respiratory: Denies cough, congestion, shortness of breath, AUSTIN, wheezing or stridor. Cardiovascular: Denies precordial pain, palpitations, edema or PND  Gastrointestinal: Denies poor appetite, indigestion, abdominal pain or blood in stool  Genitourinary: Denies hematuria, dysuria, increased urinary frequency  Musculoskeletal: Denies joint pain or swelling from muscles or joints  Neurologic: Denies tremor, paresthesias, headache, or sensory motor disturbance  Psychiatric: Denies confusion, insomnia, depression  Integumentray: Denies rash, itching or ulcers. Hematologic: Denies easy bruising, bleeding       PHYSICAL EXAMINATION      There were no vitals filed for this visit. General: Well developed, in no acute distress. HEENT: No jaundice, oral mucosa moist, no oral ulcers  Neck: Supple, no stiffness, no lymphadenopathy, supple  Heart:  Normal S1/S2 negative S3 or S4. Regular, no murmur, gallop or rub, no jugular venous distention  Respiratory: Clear bilaterally x 4, no wheezing or rales  Abdomen:   Soft, non-tender, bowel sounds are active.   Extremities:  No edema, normal cap refill, no cyanosis. Musculoskeletal: No clubbing, no deformities  Neuro: A&Ox3, speech clear, gait stable, cooperative, no focal neurologic deficits  Skin: Skin color is normal. No rashes or lesions. Non diaphoretic, moist.  Vascular: 2+ pulses symmetric in all extremities       DIAGNOSTIC DATA      EKG:        LABORATORY DATA      Lab Results   Component Value Date/Time    WBC 5.2 03/01/2019 11:20 AM    HGB 10.0 (L) 03/01/2019 11:20 AM    HCT 32.3 (L) 03/01/2019 11:20 AM    PLATELET 831 24/28/6362 11:20 AM    MCV 85 03/01/2019 11:20 AM      Lab Results   Component Value Date/Time    Sodium 144 03/01/2019 11:20 AM    Potassium 4.9 03/01/2019 11:20 AM    Chloride 106 03/01/2019 11:20 AM    CO2 23 03/01/2019 11:20 AM    Anion gap 13 12/27/2018 08:02 AM    Glucose 121 (H) 03/01/2019 11:20 AM    BUN 15 03/01/2019 11:20 AM    Creatinine 0.98 03/01/2019 11:20 AM    BUN/Creatinine ratio 15 03/01/2019 11:20 AM    GFR est AA 91 03/01/2019 11:20 AM    GFR est non-AA 79 03/01/2019 11:20 AM    Calcium 9.2 03/01/2019 11:20 AM    Bilirubin, total 0.2 12/27/2018 08:02 AM    AST (SGOT) 23 12/27/2018 08:02 AM    Alk. phosphatase 71 12/27/2018 08:02 AM    Protein, total 7.2 12/27/2018 08:02 AM    Albumin 3.7 12/27/2018 08:02 AM    Globulin 3.5 12/27/2018 08:02 AM    A-G Ratio 1.1 12/27/2018 08:02 AM    ALT (SGPT) 26 12/27/2018 08:02 AM           ASSESSMENT       1. Atrial fibrillation              A. Paroxysmal  2. Hypertension  3. Dyslipidemia  4. Anemia  5.  Hematuria - resolved       PLAN     We will plan for repeat 30-day monitor to evaluate for adequacy of rate/rhythm control with his current dose of flecainide. Should he have recurrent episodes of tachycardia will plan to increase flecainide to 150 mg twice daily. Discussed options of left atrial appendage occlusion given his history of recurrent symptomatic anemia. He will contact his insurance company to determine coverage. FOLLOW-UP     After monitor completed    Thank you, Nabeel Pennington MD and Dr. Kriss Hanks for allowing me to participate in the care of this extraordinarily pleasant male. Please do not hesitate to contact me for further questions/concerns.          Weston Mtz MD  Cardiac Electrophysiology / Cardiology    [address]  [facility], [address]  [phone] / [phone] Fax   [phone] / [phone] Fax

## 2019-03-12 ENCOUNTER — DOCUMENTATION ONLY (OUTPATIENT)
Dept: CARDIOLOGY CLINIC | Age: 69
End: 2019-03-12

## 2019-03-12 NOTE — PROGRESS NOTES
eliquis was denied coverage because it is covered for him at the cost sharing tier for the 2019 Drug Guide from Medicare, they canged the tier exception (co-pay reduction) for the 2019 plan year.

## 2019-03-15 ENCOUNTER — TELEPHONE (OUTPATIENT)
Dept: CARDIOLOGY CLINIC | Age: 69
End: 2019-03-15

## 2019-03-15 NOTE — TELEPHONE ENCOUNTER
He has a wrist device to check his pressures. In the AM he runs 149-150/75. This afternoon, he has started feeling poorly while watching basketball. 102/46 and 101/45. I reviewed all meds as correct. He has been feeling extreme fatigue. While I was on the phone with him, he got 119/95 then repeated the reading and got 153/129! I told him I did not think his device was very accurate but I would forward this info to Dr Preet Tomlin.

## 2019-03-15 NOTE — TELEPHONE ENCOUNTER
These numbers are all over the place. Consider getting an arm cuff. Keep detailed log of BP twice daily and call us in 2 weeks.    Thanks,   SK

## 2019-04-02 ENCOUNTER — TELEPHONE (OUTPATIENT)
Dept: CARDIOLOGY CLINIC | Age: 69
End: 2019-04-02

## 2019-04-02 NOTE — TELEPHONE ENCOUNTER
Return call placed to pt (IDx2). Advised of Dr. Pulido Shows recommendations for taking BP BID and repeating the BP on the opposite arm when numbers are extreme high or low. Keep medication as prescribed. Pt states that he disagrees. He believes that when his DBP is below 60, he feels \"blah. \" He has no energy and feels drained. Pt also states that he has an appointment with his PCP next week and would like to discuss BP with him as well. Will forward to Dr. Avitia Her.

## 2019-04-02 NOTE — TELEPHONE ENCOUNTER
He sent in recent BP readings past 2 weeks. Most numbers are OK, except on 3/28/19, /60 in AM and then 91/47 in PM --on March 25, BP 98/56 at 2 PM---  those numbers are an aberration. Will keep meds as is and follow BP (will have him check both arms and recheck BP whenever it is low to make sure that is accurate reading.

## 2019-04-19 ENCOUNTER — OFFICE VISIT (OUTPATIENT)
Dept: CARDIOLOGY CLINIC | Age: 69
End: 2019-04-19

## 2019-04-19 VITALS
RESPIRATION RATE: 16 BRPM | HEIGHT: 70 IN | OXYGEN SATURATION: 97 % | SYSTOLIC BLOOD PRESSURE: 114 MMHG | DIASTOLIC BLOOD PRESSURE: 54 MMHG | HEART RATE: 56 BPM | WEIGHT: 246.8 LBS | BODY MASS INDEX: 35.33 KG/M2

## 2019-04-19 DIAGNOSIS — E66.01 SEVERE OBESITY (HCC): ICD-10-CM

## 2019-04-19 DIAGNOSIS — E11.8 TYPE 2 DIABETES MELLITUS WITH COMPLICATION, UNSPECIFIED WHETHER LONG TERM INSULIN USE: ICD-10-CM

## 2019-04-19 DIAGNOSIS — I48.91 ATRIAL FIBRILLATION, UNSPECIFIED TYPE (HCC): Primary | ICD-10-CM

## 2019-04-19 DIAGNOSIS — I10 ESSENTIAL HYPERTENSION: ICD-10-CM

## 2019-04-19 RX ORDER — METOPROLOL TARTRATE 25 MG/1
25 TABLET, FILM COATED ORAL 2 TIMES DAILY
COMMUNITY
Start: 2019-02-03 | End: 2020-02-11

## 2019-04-19 NOTE — PROGRESS NOTES
Room # 1    [facility] ER about 3 weeks ago for anemia, [facility] a week ago for UTI    Seeing Dr. Cherelle Edgra on Monday for follow up to testing, bladder stones found on ultrasound     Denies any cardiac complaints at this time.      Visit Vitals  /54 (BP 1 Location: Left arm, BP Patient Position: Sitting)   Pulse (!) 56   Resp 16   Ht 5' 10\" (1.778 m)   Wt 246 lb 12.8 oz (111.9 kg)   SpO2 97%   BMI 35.41 kg/m²

## 2019-04-19 NOTE — PROGRESS NOTES
HISTORY OF PRESENTING ILLNESS      Yair Arnett is a 71 y.o. male with hypertension, diabetes, hyperlipidemia, obesity, COPD, PATRICIA and PAF who is referred for further management of atrial fibrillation. Previous echocardiogram demonstrated preserved LV function and stress testing failed to demonstrated ischemia. He favored a trial of drug therapy rather than ablation and thus was started on flecainide. His dose was increased to 100 mg twice daily after being noted to have episodes of asymptomatic rapid ventricular response on a monitor. He has been on Eliquis for CVA risk reduction however has had issues with anemia in the past for which workup has been unrevealing. His previous colonoscopies revealed polyps. He is also required holidays from Eliquis due to urinary tract infections. He recently presented to the emergency room for fatigue and was again noted to be anemic. He had repeat 30-day monitor which demonstrated adequate rate/rhythm control on Flecainide. to evaluate for adequacy of rate/rhythm control with his current dose of flecainide. He is tolerating Eliquis now that his UTI has cleared. He feels that he is having symptoms of dizziness and fatigue with lower diastolic blood pressures. His BP log today shows ranging from 06-00 diastolic, 348O-153H systolic. He also has metoprolol 25mg BID on his home medication list which he has brought today. This was not noted previously and he is unsure of when this was started.      ACTIVE PROBLEM LIST     Patient Active Problem List    Diagnosis Date Noted    Severe obesity ([facility] [address] 01/10/2019    Atrial fibrillation (HCC)     COPD (chronic obstructive pulmonary disease) ([facility] [address]     Hypertension     Iron deficiency anemia     Chest pain, unspecified 12/12/2011    DM (diabetes mellitus) ([facility] [address] 12/12/2011    HTN (hypertension) 12/12/2011    Hyperlipidemia 12/12/2011    Obesity (BMI 30-39.9) 12/12/2011    Tobacco abuse 12/12/2011           PAST MEDICAL HISTORY     Past Medical History:   Diagnosis Date    Atrial fibrillation ([facility] [address]     Afib 18 - spont converted to NSR    BPH (benign prostatic hyperplasia)     COPD [facility]ch[facility] disease) ([facility])     mild COPD - 30 pack year smoking     Diabetes mellitus     Hernia of unspecified site of abdominal cavity without mention of obstruction or gangrene     High cholesterol     Hypertension     Iron deficiency anemia     JW visit     Obesity     Sleep apnea     uses CPAP           PAST SURGICAL HISTORY     Past Surgical History:   Procedure Laterality Date    RECTUM SURGERY PROCEDURE UNLISTED            ALLERGIES     No Known Allergies       FAMILY HISTORY     Family History   Problem Relation Age of Onset    Stroke Mother     negative for cardiac disease       SOCIAL HISTORY     Social History     Socioeconomic History    Marital status:      Spouse name: Not on file    Number of children: Not on file    Years of education: Not on file    Highest education level: Not on file   Tobacco Use    Smoking status: Former Smoker     Last attempt to quit: 6/3/1990     Years since quittin.8    Smokeless tobacco: Never Used   Substance and Sexual Activity    Alcohol use: Yes     Comment: 3 drinks per night    Drug use: No         MEDICATIONS     Current Outpatient Medications   Medication Sig    metoprolol tartrate (LOPRESSOR) 25 mg tablet Take 25 mg by mouth two (2) times a day.  flecainide (TAMBOCOR) 100 mg tablet Take 1 Tab by mouth two (2) times a day.  dilTIAZem CD (CARDIZEM CD) 300 mg ER capsule Take 1 Cap by mouth daily.  sodium chloride (SALINE NASAL NA) by Nasal route as needed.  SPIRIVA RESPIMAT 2.5 mcg/actuation inhaler 2 Puffs daily.  apixaban (ELIQUIS) 5 mg tablet Take 1 Tab by mouth two (2) times a day.  sertraline (ZOLOFT) 100 mg tablet Take 50 mg by mouth daily.  cetirizine HCl (ALLER-BOSTON PO) Take  by mouth two (2) times daily as needed.  montelukast (SINGULAIR) 10 mg tablet Take 10 mg by mouth daily.  nicotinic acid (NIACIN) 500 mg tablet Take 500 mg by mouth Daily (before breakfast).  raNITIdine (ZANTAC) 150 mg tablet Take 150 mg by mouth two (2) times a day.  iron polysaccharide complex (POLY-IRON PO) Take 150 mg by mouth two (2) times a day.  insulin NPH human isophane (NOVOLIN N SC) 30 Units by SubCUTAneous route two (2) times a day.  ascorbic acid, vitamin C, (VITAMIN C) 500 mg tablet Take  by mouth every seven (7) days.  ergocalciferol (ERGOCALCIFEROL) 50,000 unit capsule Take 50,000 Units by mouth every seven (7) days. Indications: PREVENTION OF VITAMIN D DEFICIENCY    cyanocobalamin 1,000 mcg tablet Take 1,000 mcg by mouth every seven (7) days. Indications: PREVENTION OF VITAMIN B12 DEFICIENCY    albuterol (PROAIR HFA) 90 mcg/actuation inhaler Take  by inhalation as needed.  metFORMIN (GLUMETZA) 1,000 mg TG24 24 hour tablet Take 1,500 mg by mouth two (2) times a day.  omeprazole (PRILOSEC) 40 mg capsule Take 40 mg by mouth two (2) times a day.  tamsulosin (FLOMAX) 0.4 mg capsule Take 0.4 mg by mouth two (2) times a day.  rosuvastatin (CRESTOR) 40 mg tablet Take 40 mg by mouth daily. NON FORMULARY     omega-3 fatty acids-vitamin e (FISH OIL) 1,000 mg Cap Take 1 Cap by mouth daily. No current facility-administered medications for this visit. I have reviewed the nurses notes, vitals, problem list, allergy list, medical history, family, social history and medications. REVIEW OF SYMPTOMS      General: Pt denies excessive weight gain or loss. Pt is able to conduct ADL's  HEENT: Denies blurred vision, headaches, hearing loss, epistaxis and difficulty swallowing. Respiratory: Denies cough, congestion, shortness of breath, AUSTIN, wheezing or stridor.   Cardiovascular: Denies precordial pain, palpitations, edema or PND  Gastrointestinal: Denies poor appetite, indigestion, abdominal pain or blood in stool  Genitourinary: Denies hematuria, dysuria, increased urinary frequency  Musculoskeletal: Denies joint pain or swelling from muscles or joints  Neurologic: Denies tremor, paresthesias, headache, or sensory motor disturbance  Psychiatric: Denies confusion, insomnia, depression  Integumentray: Denies rash, itching or ulcers. Hematologic: Denies easy bruising, bleeding       PHYSICAL EXAMINATION      Vitals:    04/19/19 1028   BP: 114/54   Pulse: (!) 56   Resp: 16   SpO2: 97%   Weight: 246 lb 12.8 oz (111.9 kg)   Height: 5' 10\" (1.778 m)     General: Well developed, in no acute distress. HEENT: No jaundice, oral mucosa moist, no oral ulcers  Neck: Supple, no stiffness, no lymphadenopathy, supple  Heart:  Normal S1/S2 negative S3 or S4. Regular, no murmur, gallop or rub, no jugular venous distention  Respiratory: Clear bilaterally x 4, no wheezing or rales  Abdomen:   Soft, non-tender, bowel sounds are active.   Extremities:  No edema, normal cap refill, no cyanosis. Musculoskeletal: No clubbing, no deformities  Neuro: A&Ox3, speech clear, gait stable, cooperative, no focal neurologic deficits  Skin: Skin color is normal. No rashes or lesions.  Non diaphoretic, moist.  Vascular: 2+ pulses symmetric in all extremities       DIAGNOSTIC DATA      EKG:        LABORATORY DATA      Lab Results   Component Value Date/Time    WBC 5.2 03/01/2019 11:20 AM    HGB 10.0 (L) 03/01/2019 11:20 AM    HCT 32.3 (L) 03/01/2019 11:20 AM    PLATELET 683 47/65/1365 11:20 AM    MCV 85 03/01/2019 11:20 AM      Lab Results   Component Value Date/Time    Sodium 144 03/01/2019 11:20 AM    Potassium 4.9 03/01/2019 11:20 AM    Chloride 106 03/01/2019 11:20 AM    CO2 23 03/01/2019 11:20 AM    Anion gap 13 12/27/2018 08:02 AM    Glucose 121 (H) 03/01/2019 11:20 AM    BUN 15 03/01/2019 11:20 AM    Creatinine 0.98 03/01/2019 11:20 AM    BUN/Creatinine ratio 15 03/01/2019 11:20 AM    GFR est AA 91 03/01/2019 11:20 AM    GFR est non-AA 79 03/01/2019 11:20 AM    Calcium 9.2 03/01/2019 11:20 AM    Bilirubin, total 0.2 12/27/2018 08:02 AM    AST (SGOT) 23 12/27/2018 08:02 AM    Alk. phosphatase 71 12/27/2018 08:02 AM    Protein, total 7.2 12/27/2018 08:02 AM    Albumin 3.7 12/27/2018 08:02 AM    Globulin 3.5 12/27/2018 08:02 AM    A-G Ratio 1.1 12/27/2018 08:02 AM    ALT (SGPT) 26 12/27/2018 08:02 AM           ASSESSMENT       1. Atrial fibrillation              A. Paroxysmal  2. Hypertension  3. Dyslipidemia  4. Anemia  5.  Hematuria - resolved     PLAN     He will do a trial of holding metoprolol for 2 weeks and monitor blood pressure and heart rate to see if he derives improvement in symptoms while maintaining rate control. Will repeat holter monitor if unable to evaluate from his own recordings. Return for EKG and follow up in 3 weeks. FOLLOW-UP   3 weeks. Thank you, Twin Lay MD for allowing me to participate in the care of this extraordinarily pleasant male. Please do not hesitate to contact me for further questions/concerns.      Robert Hart NP

## 2019-05-17 ENCOUNTER — OFFICE VISIT (OUTPATIENT)
Dept: CARDIOLOGY CLINIC | Age: 69
End: 2019-05-17

## 2019-05-17 VITALS
OXYGEN SATURATION: 97 % | SYSTOLIC BLOOD PRESSURE: 150 MMHG | HEART RATE: 54 BPM | BODY MASS INDEX: 35.22 KG/M2 | WEIGHT: 246 LBS | RESPIRATION RATE: 20 BRPM | DIASTOLIC BLOOD PRESSURE: 76 MMHG | HEIGHT: 70 IN

## 2019-05-17 DIAGNOSIS — I48.91 ATRIAL FIBRILLATION, UNSPECIFIED TYPE (HCC): Primary | ICD-10-CM

## 2019-05-17 NOTE — PROGRESS NOTES
HISTORY OF PRESENTING ILLNESS      Kulwinder Cevallos is a 71 y.o. male with hypertension, diabetes, hyperlipidemia, obesity, COPD, PATRICIA and PAF who is referred for further management of atrial fibrillation. Previous echocardiogram demonstrated preserved LV function and stress testing failed to demonstrated ischemia.  He favored a trial of drug therapy rather than ablation and thus was started on flecainide.  His dose was increased to 100 mg twice daily after being noted to have episodes of asymptomatic rapid ventricular response on a monitor. [facility] has been on Eliquis for CVA risk reduction however has had issues with anemia in the past for which workup has been unrevealing.  His previous colonoscopies revealed polyps. [facility] is also required holidays from Eliquis due to urinary tract infections. [facility] recently presented to the emergency room for fatigue and was again noted to be anemic. He had repeat 30-day monitor which demonstrated adequate rate/rhythm control on Flecainide. to evaluate for adequacy of rate/rhythm control with his current dose of flecainide. He is tolerating Eliquis now that his UTI has cleared. He feels that he is having symptoms of dizziness and fatigue with lower diastolic blood pressures. His BP log showed numbers ranging from 24-78 diastolic, 709P-690R systolic. We arranged for a trial of holding metoprolol, during which his symptoms were unchanged and blood pressures trended hypertensive. He restarted metoprolol after 2 weeks and noted no change in his symptoms, but improvement in BP and HR control.       ACTIVE PROBLEM LIST     Patient Active Problem List    Diagnosis Date Noted    Severe obesity ([facility] [address] 01/10/2019    Atrial fibrillation (HCC)     COPD (chronic obstructive pulmonary disease) ([facility] [address]     Hypertension     Iron deficiency anemia     Chest pain, unspecified 12/12/2011    DM (diabetes mellitus) ([facility] [address] 12/12/2011    HTN (hypertension) 12/12/2011    Hyperlipidemia 12/12/2011  Obesity (BMI 30-39.9) 2011    Tobacco abuse 2011           PAST MEDICAL HISTORY     Past Medical History:   Diagnosis Date    Atrial fibrillation ([address]     Afib 18 - spont converted to NSR    BPH (benign prostatic hyperplasia)     COPD (chronic obstructive pulmonary disease) (HCC)     mild COPD - 30 pack year smoking     Diabetes mellitus     Hernia of unspecified site of abdominal cavity without mention of obstruction or gangrene     High cholesterol     Hypertension     Iron deficiency anemia     JW visit     Obesity     Sleep apnea     uses CPAP           PAST SURGICAL HISTORY     Past Surgical History:   Procedure Laterality Date    RECTUM SURGERY PROCEDURE UNLISTED            ALLERGIES     No Known Allergies       FAMILY HISTORY     Family History   Problem Relation Age of Onset    Stroke Mother     negative for cardiac disease       SOCIAL HISTORY     Social History     Socioeconomic History    Marital status:      Spouse name: Not on file    Number of children: Not on file    Years of education: Not on file    Highest education level: Not on file   Tobacco Use    Smoking status: Former Smoker     Last attempt to quit: 6/3/1990     Years since quittin.9    Smokeless tobacco: Never Used   Substance and Sexual Activity    Alcohol use: Yes     Comment: 3 drinks per night    Drug use: No         MEDICATIONS     Current Outpatient Medications   Medication Sig    metoprolol tartrate (LOPRESSOR) 25 mg tablet Take 25 mg by mouth two (2) times a day.  flecainide (TAMBOCOR) 100 mg tablet Take 1 Tab by mouth two (2) times a day.  dilTIAZem CD (CARDIZEM CD) 300 mg ER capsule Take 1 Cap by mouth daily.  sodium chloride (SALINE NASAL NA) by Nasal route as needed.  SPIRIVA RESPIMAT 2.5 mcg/actuation inhaler 2 Puffs daily.  apixaban (ELIQUIS) 5 mg tablet Take 1 Tab by mouth two (2) times a day.  sertraline (ZOLOFT) 100 mg tablet Take 50 mg by mouth daily.  cetirizine HCl (ALLER-BOSTON PO) Take  by mouth two (2) times daily as needed.  montelukast (SINGULAIR) 10 mg tablet Take 10 mg by mouth daily.  nicotinic acid (NIACIN) 500 mg tablet Take 500 mg by mouth Daily (before breakfast).  raNITIdine (ZANTAC) 150 mg tablet Take 150 mg by mouth two (2) times a day.  iron polysaccharide complex (POLY-IRON PO) Take 150 mg by mouth two (2) times a day.  insulin NPH human isophane (NOVOLIN N SC) 30 Units by SubCUTAneous route two (2) times a day.  ascorbic acid, vitamin C, (VITAMIN C) 500 mg tablet Take  by mouth every seven (7) days.  ergocalciferol (ERGOCALCIFEROL) 50,000 unit capsule Take 50,000 Units by mouth every seven (7) days. Indications: PREVENTION OF VITAMIN D DEFICIENCY    cyanocobalamin 1,000 mcg tablet Take 1,000 mcg by mouth every seven (7) days. Indications: PREVENTION OF VITAMIN B12 DEFICIENCY    albuterol (PROAIR HFA) 90 mcg/actuation inhaler Take  by inhalation as needed.  metFORMIN (GLUMETZA) 1,000 mg TG24 24 hour tablet Take 1,500 mg by mouth two (2) times a day.  omeprazole (PRILOSEC) 40 mg capsule Take 40 mg by mouth two (2) times a day.  tamsulosin (FLOMAX) 0.4 mg capsule Take 0.4 mg by mouth two (2) times a day.  rosuvastatin (CRESTOR) 40 mg tablet Take 40 mg by mouth daily. NON FORMULARY     omega-3 fatty acids-vitamin e (FISH OIL) 1,000 mg Cap Take 1 Cap by mouth daily. No current facility-administered medications for this visit. I have reviewed the nurses notes, vitals, problem list, allergy list, medical history, family, social history and medications. REVIEW OF SYMPTOMS      General: Pt denies excessive weight gain or loss. Pt is able to conduct ADL's  HEENT: Denies blurred vision, headaches, hearing loss, epistaxis and difficulty swallowing. Respiratory: Denies cough, congestion, shortness of breath, AUSTIN, wheezing or stridor.   Cardiovascular: Denies precordial pain, palpitations, edema or PND  Gastrointestinal: Denies poor appetite, indigestion, abdominal pain or blood in stool  Genitourinary: Denies hematuria, dysuria, increased urinary frequency  Musculoskeletal: Denies joint pain or swelling from muscles or joints  Neurologic: Denies tremor, paresthesias, headache, or sensory motor disturbance  Psychiatric: Denies confusion, insomnia, depression  Integumentray: Denies rash, itching or ulcers. Hematologic: Denies easy bruising, bleeding       PHYSICAL EXAMINATION      There were no vitals filed for this visit. General: Well developed, in no acute distress. HEENT: No jaundice, oral mucosa moist, no oral ulcers  Neck: Supple, no stiffness, no lymphadenopathy, supple  Heart:  Normal S1/S2 negative S3 or S4. Regular, no murmur, gallop or rub, no jugular venous distention  Respiratory: Clear bilaterally x 4, no wheezing or rales  Abdomen:   Soft, non-tender, bowel sounds are active.   Extremities:  No edema, normal cap refill, no cyanosis. Musculoskeletal: No clubbing, no deformities  Neuro: A&Ox3, speech clear, gait stable, cooperative, no focal neurologic deficits  Skin: Skin color is normal. No rashes or lesions.  Non diaphoretic, moist.  Vascular: 2+ pulses symmetric in all extremities       DIAGNOSTIC DATA      EKG:        LABORATORY DATA      Lab Results   Component Value Date/Time    WBC 5.2 03/01/2019 11:20 AM    HGB 10.0 (L) 03/01/2019 11:20 AM    HCT 32.3 (L) 03/01/2019 11:20 AM    PLATELET 930 34/15/5554 11:20 AM    MCV 85 03/01/2019 11:20 AM      Lab Results   Component Value Date/Time    Sodium 144 03/01/2019 11:20 AM    Potassium 4.9 03/01/2019 11:20 AM    Chloride 106 03/01/2019 11:20 AM    CO2 23 03/01/2019 11:20 AM    Anion gap 13 12/27/2018 08:02 AM    Glucose 121 (H) 03/01/2019 11:20 AM    BUN 15 03/01/2019 11:20 AM    Creatinine 0.98 03/01/2019 11:20 AM    BUN/Creatinine ratio 15 03/01/2019 11:20 AM    GFR est AA 91 03/01/2019 11:20 AM    GFR est non-AA 79 03/01/2019 11:20 AM Calcium 9.2 03/01/2019 11:20 AM    Bilirubin, total 0.2 12/27/2018 08:02 AM    AST (SGOT) 23 12/27/2018 08:02 AM    Alk. phosphatase 71 12/27/2018 08:02 AM    Protein, total 7.2 12/27/2018 08:02 AM    Albumin 3.7 12/27/2018 08:02 AM    Globulin 3.5 12/27/2018 08:02 AM    A-G Ratio 1.1 12/27/2018 08:02 AM    ALT (SGPT) 26 12/27/2018 08:02 AM           ASSESSMENT       1. Atrial fibrillation              A. Paroxysmal  2. Hypertension  3. Dyslipidemia  4. Anemia  5.  Hematuria - resolved     PLAN     Suspect his symptoms of fatigue are more related to anemia for which he is undergoing workup. Will continue current medical therapy and follow up to evaluate clinically for AF recurrence in 6 months. FOLLOW-UP   6months    Thank you, Nancy Mark MD for allowing me to participate in the care of this extraordinarily pleasant male. Please do not hesitate to contact me for further questions/concerns.      Pierre Chase NP

## 2019-05-17 NOTE — PROGRESS NOTES
Room # 1    Visit Vitals  /76 (BP 1 Location: Left arm, BP Patient Position: Sitting)   Pulse (!) 54   Resp 20   Ht 5' 10\" (1.778 m)   Wt 246 lb (111.6 kg)   SpO2 97%   BMI 35.30 kg/m²

## 2019-08-16 RX ORDER — APIXABAN 5 MG/1
TABLET, FILM COATED ORAL
Qty: 60 TAB | Refills: 1 | Status: SHIPPED | OUTPATIENT
Start: 2019-08-16 | End: 2019-10-11 | Stop reason: SDUPTHER

## 2019-10-15 RX ORDER — APIXABAN 5 MG/1
TABLET, FILM COATED ORAL
Qty: 180 TAB | Refills: 1 | Status: SHIPPED | OUTPATIENT
Start: 2019-10-15 | End: 2020-04-08

## 2019-10-15 NOTE — TELEPHONE ENCOUNTER
Per Dr. Willis Lopes VO:  PGX:7/50/35  Future Appointments   Date Time Provider Chris Keys   12/4/2019  2:40 PM JOSE A Ochoa     Requested Prescriptions     Signed Prescriptions Disp Refills    ELIQUIS 5 mg tablet 180 Tab 1     Sig: TAKE 1 TABLET BY MOUTH TWICE DAILY     Authorizing Provider: Ray Harvey     Ordering User: Climmie Comfort

## 2020-01-17 RX ORDER — FLECAINIDE ACETATE 100 MG/1
TABLET ORAL
Qty: 180 TAB | Refills: 3 | OUTPATIENT
Start: 2020-01-17

## 2020-01-17 NOTE — TELEPHONE ENCOUNTER
Per Dr. Eastman Section VO:  LOV:12/4/2019  No future appointments.   Requested Prescriptions     Pending Prescriptions Disp Refills    flecainide (TAMBOCOR) 100 mg tablet [Pharmacy Med Name: FLECAINIDE ACETATE 100 MG Tablet] 180 Tab 3     Sig: TAKE 1 TABLET TWICE DAILY

## 2020-02-11 RX ORDER — METOPROLOL TARTRATE 25 MG/1
TABLET, FILM COATED ORAL
Qty: 180 TAB | Refills: 0 | Status: SHIPPED | OUTPATIENT
Start: 2020-02-11 | End: 2020-05-08

## 2020-02-11 NOTE — TELEPHONE ENCOUNTER
Per VO of Dr. Bertin Yousif: 12/4/2019    No future appointments.     Requested Prescriptions     Pending Prescriptions Disp Refills    metoprolol tartrate (LOPRESSOR) 25 mg tablet [Pharmacy Med Name: METOPROLOL TARTRATE 25MG TABLETS] 180 Tab      Sig: TAKE 1 TABLET BY MOUTH TWICE DAILY

## 2020-02-20 ENCOUNTER — TELEPHONE (OUTPATIENT)
Dept: CARDIOLOGY CLINIC | Age: 70
End: 2020-02-20

## 2020-02-20 NOTE — TELEPHONE ENCOUNTER
Spoke to pt,   Pt stated that he would see Dr. Demetria Fitzgerald first and if needed, f/u with Dr. Clayton Tenorio. Trish,  Please call pt to schedule annual for Dr. Demetria Fitzgerald.

## 2020-02-20 NOTE — TELEPHONE ENCOUNTER
Patient would like to talk with Andrea Nuñez about an appointment, he would like to discuss when he needs to come in again.      Phone: [phone]

## 2020-02-28 ENCOUNTER — TELEPHONE (OUTPATIENT)
Dept: CARDIOLOGY CLINIC | Age: 70
End: 2020-02-28

## 2020-02-28 NOTE — TELEPHONE ENCOUNTER
Patient is requesting to speak with [address] [place] [address] regarding an appointment with Dr Kimmie Jenkins and Dr Bree Bowser.      Phone: [phone]

## 2020-04-07 ENCOUNTER — TELEPHONE (OUTPATIENT)
Dept: CARDIOLOGY CLINIC | Age: 70
End: 2020-04-07

## 2020-04-07 NOTE — TELEPHONE ENCOUNTER
Patient states [facility] notified him that his Flecainide has been taken out off the market. He states [employer] told him it was best to stop it and discuss other options with his doctor. Please advise.      Phone: [phone]

## 2020-04-08 NOTE — TELEPHONE ENCOUNTER
Patient is calling back again regarding previous message. Patient is out of the medication and would like to speak to someone about it. Please advise.     Phone: [phone]

## 2020-04-09 NOTE — TELEPHONE ENCOUNTER
I called [facility], patient ID verified using 2 patient identifiers. Spoke with pharmacy tech, she states Flecainide has not been taken off the market. She verified that an order for Flecainide had therese placed on hold because refill  had been requested too soon. She did say that patient's Ranitidine had been taken off of the market and that was the only alert that she saw. Patient's Flecainide was shipped on 3/25/20 and delivered to patient's home address on 3/28/20. Called patient, ID verified using two patient identifiers. Informed patient of above conversation with [facility] pharmacy tech. Patient states that he does have the Flecainide but that he had stopped taking it due to concerns given him by [facility] previously. Patient denies any cardiac complaints at this time. He will resume taking the Flecainide. He does verbalize that he saw online that Flecainide can cause higher risk of heart attack. Informed patient that I would relay his concerns to Dr. Esvin Suggs and Dr. Damián Stack for them to review. Patient verbalizes understanding and is agreeable to this plan.

## 2020-04-10 NOTE — TELEPHONE ENCOUNTER
Called patient. Patient not available, spoke with his wife Hina,patient's ID and HIPAA verified. Informed her that per AWILDA Iveyem NP  \"Flecainide can increase risk of heart attack when used improperly or in patients with history of CAD. He has had normal stress testing and therefore it is appropriate for him to be on Flecainide regimen. \" Jose Antonio Nicole understanding and will relay information to patient.

## 2020-05-08 RX ORDER — METOPROLOL TARTRATE 25 MG/1
TABLET, FILM COATED ORAL
Qty: 180 TAB | Refills: 0 | Status: SHIPPED | OUTPATIENT
Start: 2020-05-08 | End: 2020-08-06

## 2020-06-16 ENCOUNTER — OFFICE VISIT (OUTPATIENT)
Dept: CARDIOLOGY CLINIC | Age: 70
End: 2020-06-16

## 2020-06-16 VITALS
DIASTOLIC BLOOD PRESSURE: 62 MMHG | HEIGHT: 70 IN | SYSTOLIC BLOOD PRESSURE: 126 MMHG | WEIGHT: 252 LBS | BODY MASS INDEX: 36.08 KG/M2 | OXYGEN SATURATION: 98 % | HEART RATE: 56 BPM

## 2020-06-16 DIAGNOSIS — I48.91 ATRIAL FIBRILLATION, UNSPECIFIED TYPE (HCC): Primary | ICD-10-CM

## 2020-06-16 DIAGNOSIS — I10 ESSENTIAL HYPERTENSION: ICD-10-CM

## 2020-06-16 RX ORDER — UMECLIDINIUM BROMIDE AND VILANTEROL TRIFENATATE 62.5; 25 UG/1; UG/1
1 POWDER RESPIRATORY (INHALATION) DAILY
COMMUNITY
End: 2021-06-24

## 2020-06-16 NOTE — PROGRESS NOTES
Visit Vitals  /62 (BP 1 Location: Left arm, BP Patient Position: Sitting)   Pulse (!) 56   Ht 5' 10\" (1.778 m)   Wt 252 lb (114.3 kg)   SpO2 98%   BMI 36.16 kg/m²         Chest pain: no  Shortness of breath: yes pending pulm appt next mo  Edema: no  Palpitations: no  Dizziness: no    New diagnosis/Surgeries: no    ER/Hospitalizations: no    Refills:

## 2020-06-16 NOTE — PROGRESS NOTES
Tiarra Ovalle MD. [facility] [place]              Patient: Cathy Rivera  : 1950      Today's Date: 2020            HISTORY OF PRESENT ILLNESS:     History of Present Illness:  Doing well. No specific. No CP or SOB. No heart racing. Still playing golf. PAST MEDICAL HISTORY:     Past Medical History:   Diagnosis Date    Atrial fibrillation ([address]     Afib 18 - spont converted to NSR    BPH (benign prostatic hyperplasia)     COPD (chronic obstructive pulmonary disease) (HCC)     mild COPD - 30 pack year smoking     Diabetes mellitus     Hernia of unspecified site of abdominal cavity without mention of obstruction or gangrene     High cholesterol     Hypertension     Iron deficiency anemia     JW visit     Obesity     Sleep apnea     uses CPAP         Past Surgical History:   Procedure Laterality Date    RECTUM SURGERY PROCEDURE UNLISTED           MEDICATIONS:     Current Outpatient Medications   Medication Sig Dispense Refill    umeclidinium-vilanteroL (Anoro Ellipta) 62.5-25 mcg/actuation inhaler Take 1 Puff by inhalation daily.  metoprolol tartrate (LOPRESSOR) 25 mg tablet TAKE 1 TABLET BY MOUTH TWICE DAILY 180 Tab 0    Eliquis 5 mg tablet TAKE 1 TABLET BY MOUTH TWICE DAILY 180 Tab 0    flecainide (TAMBOCOR) 100 mg tablet Take 1 Tab by mouth two (2) times a day. 180 Tab 3    dilTIAZem CD (CARDIZEM CD) 300 mg ER capsule Take 1 Cap by mouth daily. 90 Cap 2    sodium chloride (SALINE NASAL NA) by Nasal route as needed.  sertraline (ZOLOFT) 100 mg tablet Take 100 mg by mouth daily.  cetirizine HCl (ALLER-BOSTON PO) Take  by mouth two (2) times daily as needed.  montelukast (SINGULAIR) 10 mg tablet Take 10 mg by mouth daily.  nicotinic acid (NIACIN) 500 mg tablet Take 500 mg by mouth Daily (before breakfast).  iron polysaccharide complex (POLY-IRON PO) Take 130 mg by mouth every three (3) days.  2 tablets every 3 days      insulin NPH human isophane (NOVOLIN N SC) 25 Units by SubCUTAneous route daily.  ascorbic acid, vitamin C, (VITAMIN C) 500 mg tablet Take  by mouth every seven (7) days.  ergocalciferol (ERGOCALCIFEROL) 50,000 unit capsule Take 50,000 Units by mouth every seven (7) days. Indications: PREVENTION OF VITAMIN D DEFICIENCY      cyanocobalamin 1,000 mcg tablet Take 1,000 mcg by mouth every seven (7) days. Indications: PREVENTION OF VITAMIN B12 DEFICIENCY      albuterol (PROAIR HFA) 90 mcg/actuation inhaler Take  by inhalation as needed.  omeprazole (PRILOSEC) 40 mg capsule Take 40 mg by mouth two (2) times a day.  tamsulosin (FLOMAX) 0.4 mg capsule Take 0.4 mg by mouth two (2) times a day.  rosuvastatin (CRESTOR) 40 mg tablet Take 40 mg by mouth daily. NON FORMULARY       omega-3 fatty acids-vitamin e (FISH OIL) 1,000 mg Cap Take 1 Cap by mouth daily. No Known Allergies        SOCIAL HISTORY:     Social History     Tobacco Use    Smoking status: Former Smoker     Last attempt to quit: 6/3/1990     Years since quittin.0    Smokeless tobacco: Never Used   Substance Use Topics    Alcohol use: Yes     Comment: 3 drinks per night    Drug use: No         FAMILY HISTORY:     Family History   Problem Relation Age of Onset    Stroke Mother           REVIEW OF SYMPTOMS:       Review of Symptoms:  Constitutional: Negative for fever, chills  HEENT: Negative for nosebleeds, tinnitus, and vision changes. Respiratory: Negative for cough, wheezing  +[place]  Cardiovascular: Negative for orthopnea, claudication, syncope, and PND. Gastrointestinal: Negative for abdominal pain, melena. Genitourinary: Negative for dysuria  Musculoskeletal: Negative for myalgias. Skin: Negative for rash  Heme: No problems bleeding recently   Neurological: Negative for speech change and focal weakness.    + occ diarrhea           PHYSICAL EXAM:       Physical Exam:  Visit Vitals  /62 (BP 1 Location: Left arm, BP Patient Position: Sitting)   Pulse (!) 56   Ht 5' 10\" (1.778 m)   Wt 252 lb (114.3 kg)   SpO2 98%   BMI 36.16 kg/m²          Patient appears generally well, mood and affect are appropriate and pleasant. HEENT:  Hearing intact, non-icteric, normocephalic, atraumatic. Neck Exam: Supple, No JVD  Lung Exam: Clear to auscultation, even breath sounds. Cardiac Exam: Regular rate and rhythm with 2/6 systolic murmur  Abdomen: Soft, non-tender, normal bowel sounds. + obese   Extremities: Moves all ext well. No lower extremity edema. Psych: Appropriate affect  Neuro - Grossly intact              LABS / OTHER STUDIES:          Lab Results   Component Value Date/Time    Sodium 144 03/01/2019 11:20 AM    Potassium 4.9 03/01/2019 11:20 AM    Chloride 106 03/01/2019 11:20 AM    CO2 23 03/01/2019 11:20 AM    Anion gap 13 12/27/2018 08:02 AM    Glucose 121 (H) 03/01/2019 11:20 AM    BUN 15 03/01/2019 11:20 AM    Creatinine 0.98 03/01/2019 11:20 AM    BUN/Creatinine ratio 15 03/01/2019 11:20 AM    GFR est AA 91 03/01/2019 11:20 AM    GFR est non-AA 79 03/01/2019 11:20 AM    Calcium 9.2 03/01/2019 11:20 AM    Bilirubin, total 0.2 12/27/2018 08:02 AM    Alk. phosphatase 71 12/27/2018 08:02 AM    Protein, total 7.2 12/27/2018 08:02 AM    Albumin 3.7 12/27/2018 08:02 AM    Globulin 3.5 12/27/2018 08:02 AM    A-G Ratio 1.1 12/27/2018 08:02 AM    ALT (SGPT) 26 12/27/2018 08:02 AM        Labs 2/5/16 - Iron 52, ferritin 20, Hgb 12.8  Labs 2/27/18 - A1c 7.2,ferritin 29, iron 77, Hgb 13.4, plt 187, chol 140, , LDL 63, HDL 42, CMP OK,                  CARDIAC DIAGNOSTICS:       Cardiac Evaluation Includes:  Exercise cardiolite 2011 - 1. Objectively normal treadmill stress test (patient reached only 79% of  maximal predicted heart rate, thus decreasing the sensitivity of the test). His max workload was 9.1 METS. 2. Atypical, mild chest pain.  3. Normal exercise gated myocardial perfusion study without inducible  Ischemia.  4. Normal systolic function with an left ventricular ejection fraction of   77%.    Echo 5/21/18 - LVEF 60%.  RV normal.  Mild MR.  AV sclerosis   Lexiscan Cardiolite 5/21/18 - normal MPI, LVEF 68%.      Event Monitor 10/2/18 - Afib at at 6:30 PM (-110) and at 7 PM (HR 80-90 bpm)       Event Monitor 1/7/19-1/20/19 - Had multiple episodes of Afib with RVR;  Did have Afib with -130 on 1/11, 1/17, and 1/19.           EKG 5/1/18 - Afib with RVR,   EKG 5/1/18 - NSR, normal   EKG 12/10/18 - NSR, normal  EKG 12/27/18 - Afib,. HR 79  EKG 1/18/19 - NSR, normal.  HR 68. EKG 2/21/19 - NSR, normal   EKG 6/16/20 - sinus kenny, normal                   ASSESSMENT AND PLAN:       Assessment and Plan:  1) PAF  - Afib 5/1/18 on ER presentation - converted to NSR on his own  - He had 10 episodes of symptoms prior then   - [id number] score is 3 (has 5% stroke risk per year)   - Continue Eliquis 5 mg BID   - Event Monitor 10/2/18 - Afib at at 6:30 PM (-110) and at 7 PM (HR 80-90 bpm)  - Eventually increased Cardizem and added flecainide to manage [place] Ice has seen Dr. Bree Bowser   - On 12/27/18 he was in ER for weakness - was in Afib then   - On 1/10/19 - feels well. Nicholes Cools is regular.   Will increase flecainide to 100 mg BID to help prevent Afib (EKG check 3 days). - Event Monitor 1/7/19-1/20/19 - Had multiple episodes of Afib with RVR;  Did have Afib with -130 on 1/11, 1/17, and 1/19.   - On 2/21/19 - No symptomatic Afib since 12/27/18 but asymptomatic runs of Afib on his event monitor (see above). Will continue Cardizem and Flecainide. Will have him see Dr. Bree Bowser for FU and further Afib management. - On 6/16/20 - He is doing well overall. Will continue cardizem, metoprolol, flecainide. Continue eliquis.    2) History of chest pain   - he has many CAD risk factors   - Lexiscan Cardiolite 5/21/18 - normal MPI, LVEF 68%.    - Symptoms got better with Tums and is now playing golf without chest pain   - No CP lately       3) HTN  - BP looks good  - continue meds       4) Dyslipidemia  - on Crestor   - prior lipids OK       5) Anemia  - Records from Dr. Ulysses Ast reviewed - her note states that patient had iron deficiency anemia - but had negative GI workup with Dr. Keysha Humphries and had multiple stools that were negative heme. - hgb has been stable on iron   - follow CBC on Eliquis    - iron may be causing diarrhea and he will try and hold it for a couple of weeks and see how he does     6)  See me in 12 month.   Patient expressed understanding of the plan - questions were answered.      Vacationed in [address] [employer].  Is retired.   Anika Prajapati MD, [address], [address]                [id number] [facility]. Suite [facility]  Ph: [phone]                                                              [phone]        ADDENDUM   1/28/2021  Fax received from [facility] for clearance. Procedure: R Hydrocelectomy under GA  Dr: Jana Palmer on 3/3/21  Medication requested to hold: Eliquis    He can proceed with the procedure and should have intermediate cardiac risk. Can hold Eliquis 2 days prior to the procedure and resume when safe.

## 2020-07-07 DIAGNOSIS — I48.91 ATRIAL FIBRILLATION, UNSPECIFIED TYPE (HCC): Primary | ICD-10-CM

## 2020-07-07 RX ORDER — APIXABAN 5 MG/1
TABLET, FILM COATED ORAL
Qty: 180 TAB | Refills: 1 | Status: SHIPPED | OUTPATIENT
Start: 2020-07-07 | End: 2021-01-19

## 2020-08-06 RX ORDER — METOPROLOL TARTRATE 25 MG/1
TABLET, FILM COATED ORAL
Qty: 180 TAB | Refills: 0 | Status: SHIPPED | OUTPATIENT
Start: 2020-08-06 | End: 2020-11-04

## 2020-11-04 RX ORDER — METOPROLOL TARTRATE 25 MG/1
TABLET, FILM COATED ORAL
Qty: 180 TAB | Refills: 0 | Status: SHIPPED | OUTPATIENT
Start: 2020-11-04 | End: 2021-01-19

## 2021-01-19 RX ORDER — APIXABAN 5 MG/1
TABLET, FILM COATED ORAL
Qty: 180 TAB | Refills: 1 | Status: SHIPPED | OUTPATIENT
Start: 2021-01-19 | End: 2021-07-19

## 2021-01-19 RX ORDER — METOPROLOL TARTRATE 25 MG/1
TABLET, FILM COATED ORAL
Qty: 180 TAB | Refills: 0 | Status: SHIPPED | OUTPATIENT
Start: 2021-01-19 | End: 2021-04-19

## 2021-01-19 NOTE — TELEPHONE ENCOUNTER
Needs updated BMP - to monitor renal function while taking eliquis.  Can use labs from PCP if recently done

## 2021-01-28 ENCOUNTER — TELEPHONE (OUTPATIENT)
Dept: CARDIOLOGY CLINIC | Age: 71
End: 2021-01-28

## 2021-01-28 NOTE — TELEPHONE ENCOUNTER
Fax received from [facility] for clearance.     Procedure: R Hydrocelectomy under GA  Dr: Allan West on 3/3/21  Medication requested to hold: Ariel    Fax to: [phone]

## 2021-02-08 ENCOUNTER — TELEPHONE (OUTPATIENT)
Dept: CARDIOLOGY CLINIC | Age: 71
End: 2021-02-08

## 2021-02-08 NOTE — TELEPHONE ENCOUNTER
Patient states that he received orders for lab work  in the mail and that he is unsure why as he has never done this before. Please advise.     Phone: [phone]

## 2021-04-19 RX ORDER — METOPROLOL TARTRATE 25 MG/1
TABLET, FILM COATED ORAL
Qty: 180 TAB | Refills: 0 | Status: SHIPPED | OUTPATIENT
Start: 2021-04-19 | End: 2021-07-19

## 2021-06-24 ENCOUNTER — OFFICE VISIT (OUTPATIENT)
Dept: CARDIOLOGY CLINIC | Age: 71
End: 2021-06-24
Payer: MEDICARE

## 2021-06-24 VITALS
DIASTOLIC BLOOD PRESSURE: 78 MMHG | HEART RATE: 50 BPM | BODY MASS INDEX: 36.79 KG/M2 | OXYGEN SATURATION: 96 % | WEIGHT: 257 LBS | HEIGHT: 70 IN | SYSTOLIC BLOOD PRESSURE: 136 MMHG

## 2021-06-24 DIAGNOSIS — E78.5 HYPERLIPIDEMIA, UNSPECIFIED HYPERLIPIDEMIA TYPE: ICD-10-CM

## 2021-06-24 DIAGNOSIS — I48.0 PAROXYSMAL ATRIAL FIBRILLATION (HCC): Primary | ICD-10-CM

## 2021-06-24 DIAGNOSIS — I10 ESSENTIAL HYPERTENSION: ICD-10-CM

## 2021-06-24 PROCEDURE — G8417 CALC BMI ABV UP PARAM F/U: HCPCS | Performed by: SPECIALIST

## 2021-06-24 PROCEDURE — 1101F PT FALLS ASSESS-DOCD LE1/YR: CPT | Performed by: SPECIALIST

## 2021-06-24 PROCEDURE — 93000 ELECTROCARDIOGRAM COMPLETE: CPT | Performed by: SPECIALIST

## 2021-06-24 PROCEDURE — G8427 DOCREV CUR MEDS BY ELIG CLIN: HCPCS | Performed by: SPECIALIST

## 2021-06-24 PROCEDURE — 3017F COLORECTAL CA SCREEN DOC REV: CPT | Performed by: SPECIALIST

## 2021-06-24 PROCEDURE — G8432 DEP SCR NOT DOC, RNG: HCPCS | Performed by: SPECIALIST

## 2021-06-24 PROCEDURE — G8752 SYS BP LESS 140: HCPCS | Performed by: SPECIALIST

## 2021-06-24 PROCEDURE — 99214 OFFICE O/P EST MOD 30 MIN: CPT | Performed by: SPECIALIST

## 2021-06-24 PROCEDURE — G8754 DIAS BP LESS 90: HCPCS | Performed by: SPECIALIST

## 2021-06-24 PROCEDURE — G8536 NO DOC ELDER MAL SCRN: HCPCS | Performed by: SPECIALIST

## 2021-06-24 RX ORDER — DILTIAZEM HYDROCHLORIDE 240 MG/1
240 CAPSULE, COATED, EXTENDED RELEASE ORAL DAILY
Qty: 1 CAPSULE | Refills: 0
Start: 2021-06-24 | End: 2021-11-02 | Stop reason: SDUPTHER

## 2021-06-24 RX ORDER — TIOTROPIUM BROMIDE AND OLODATEROL 3.124; 2.736 UG/1; UG/1
2 SPRAY, METERED RESPIRATORY (INHALATION) DAILY
COMMUNITY

## 2021-06-24 RX ORDER — PIOGLITAZONEHYDROCHLORIDE 30 MG/1
TABLET ORAL DAILY
COMMUNITY

## 2021-06-24 NOTE — PROGRESS NOTES
Mingo Fox MD. [facility] [place]              Patient: Becka Richey  : 1950      Today's Date: 2021          HISTORY OF PRESENT ILLNESS:     History of Present Illness:  Is doing OK overall. 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 BP was lower before but seems OK now (PCP cut back a med a few weeks back). Breathing OK. No CP or lightheadedness. PAST MEDICAL HISTORY:     Past Medical History:   Diagnosis Date    Atrial fibrillation ([address]     Afib 18 - spont converted to NSR    BPH (benign prostatic hyperplasia)     COPD (chronic obstructive pulmonary disease) (HCC)     mild COPD - 30 pack year smoking     Diabetes mellitus     Hernia of unspecified site of abdominal cavity without mention of obstruction or gangrene     High cholesterol     Hypertension     Iron deficiency anemia     JW visit     Obesity     Sleep apnea     uses CPAP         Past Surgical History:   Procedure Laterality Date    RECTUM SURGERY PROCEDURE UNLISTED           MEDICATIONS:     Current Outpatient Medications   Medication Sig Dispense Refill    pioglitazone (ACTOS) 30 mg tablet Take  by mouth daily.  tiotropium-olodateroL (Stiolto Respimat) 2.5-2.5 mcg/actuation inhaler Take 2 Puffs by inhalation daily.  metoprolol tartrate (LOPRESSOR) 25 mg tablet TAKE 1 TABLET BY MOUTH TWICE DAILY 180 Tab 0    Eliquis 5 mg tablet TAKE 1 TABLET BY MOUTH TWICE DAILY 180 Tab 1    flecainide (TAMBOCOR) 100 mg tablet Take 1 Tab by mouth two (2) times a day. 180 Tab 3    sodium chloride (SALINE NASAL NA) by Nasal route as needed.  sertraline (ZOLOFT) 100 mg tablet Take 100 mg by mouth daily.  montelukast (SINGULAIR) 10 mg tablet Take 10 mg by mouth daily.  iron polysaccharide complex (POLY-IRON PO) Take 130 mg by mouth every three (3) days. 2 tablets every 3 days      insulin NPH human isophane (NOVOLIN N SC) 25 Units by SubCUTAneous route daily.       ascorbic acid, vitamin C, (VITAMIN C) 500 mg tablet Take  by mouth every seven (7) days.  ergocalciferol (ERGOCALCIFEROL) 50,000 unit capsule Take 50,000 Units by mouth every seven (7) days. Indications: PREVENTION OF VITAMIN D DEFICIENCY      cyanocobalamin 1,000 mcg tablet Take 1,000 mcg by mouth every seven (7) days. Indications: PREVENTION OF VITAMIN B12 DEFICIENCY      albuterol (PROAIR HFA) 90 mcg/actuation inhaler Take  by inhalation as needed.  omeprazole (PRILOSEC) 40 mg capsule Take 40 mg by mouth two (2) times a day.  tamsulosin (FLOMAX) 0.4 mg capsule Take 0.4 mg by mouth two (2) times a day.  rosuvastatin (CRESTOR) 40 mg tablet Take 40 mg by mouth daily. NON FORMULARY       dilTIAZem ER (CARDIZEM CD) 240 mg capsule Take 1 Capsule by mouth daily. 1 Capsule 0       No Known Allergies        SOCIAL HISTORY:     Social History     Tobacco Use    Smoking status: Former Smoker     Quit date: 6/3/1990     Years since quittin.0    Smokeless tobacco: Never Used   Substance Use Topics    Alcohol use: Yes     Comment: 3 drinks per night    Drug use: No       FAMILY HISTORY:     Family History   Problem Relation Age of Onset    Stroke Mother            REVIEW OF SYMPTOMS:     Review of Symptoms:  Constitutional: Negative for fever, chills  HEENT: Negative for nosebleeds, tinnitus, and vision changes. Respiratory: Negative for cough, wheezing  Cardiovascular: Negative for orthopnea, claudication, syncope, and PND. Gastrointestinal: Negative for abdominal pain, diarrhea, melena. Genitourinary: Negative for dysuria  Musculoskeletal: Negative for myalgias. Skin: Negative for rash  Heme: No problems bleeding. Neurological: Negative for speech change and focal weakness.        PHYSICAL EXAM:     Physical Exam:  Visit Vitals  /78 (BP 1 Location: Left upper arm, BP Patient Position: Sitting, BP Cuff Size: Adult)   Pulse (!) 50   Ht 5' 10\" (1.778 m)   Wt 257 lb (116.6 kg)   SpO2 96%   BMI 36.88 kg/m²     Patient appears generally well, mood and affect are appropriate and pleasant. HEENT:  Hearing intact, non-icteric, normocephalic, atraumatic. Neck Exam: Supple, No JVD or carotid bruits. Lung Exam: Clear to auscultation, even breath sounds. Cardiac Exam: Regular rate and rhythm with no murmur or rub  Abdomen: Soft, non-tender, normal bowel sounds. Obese   Extremities: Moves all ext well. No lower extremity edema. MSKTL: Overall good ROM ext  Skin: No significant rashes  Psych: Appropriate affect  Neuro - Grossly intact        LABS / OTHER STUDIES reviewed:       Lab Results   Component Value Date/Time    Sodium 144 03/01/2019 11:20 AM    Potassium 4.9 03/01/2019 11:20 AM    Chloride 106 03/01/2019 11:20 AM    CO2 23 03/01/2019 11:20 AM    Anion gap 13 12/27/2018 08:02 AM    Glucose 121 (H) 03/01/2019 11:20 AM    BUN 15 03/01/2019 11:20 AM    Creatinine 0.98 03/01/2019 11:20 AM    BUN/Creatinine ratio 15 03/01/2019 11:20 AM    GFR est AA 91 03/01/2019 11:20 AM    GFR est non-AA 79 03/01/2019 11:20 AM    Calcium 9.2 03/01/2019 11:20 AM    Bilirubin, total 0.2 12/27/2018 08:02 AM    Alk. phosphatase 71 12/27/2018 08:02 AM    Protein, total 7.2 12/27/2018 08:02 AM    Albumin 3.7 12/27/2018 08:02 AM    Globulin 3.5 12/27/2018 08:02 AM    A-G Ratio 1.1 12/27/2018 08:02 AM    ALT (SGPT) 26 12/27/2018 08:02 AM    AST (SGOT) 23 12/27/2018 08:02 AM     Lab Results   Component Value Date/Time    WBC 5.2 03/01/2019 11:20 AM    HGB 10.0 (L) 03/01/2019 11:20 AM    HCT 32.3 (L) 03/01/2019 11:20 AM    PLATELET 659 92/48/3655 11:20 AM    MCV 85 03/01/2019 11:20 AM       Lab Results   Component Value Date/Time    Cholesterol, total 113 12/12/2011 06:15 AM    HDL Cholesterol 37 12/12/2011 06:15 AM    LDL, calculated 11.8 12/12/2011 06:15 AM    VLDL, calculated 64.2 12/12/2011 06:15 AM    Triglyceride 321 (H) 12/12/2011 06:15 AM    CHOL/HDL Ratio 3.1 12/12/2011 06:15 AM           CARDIAC DIAGNOSTICS:     Cardiac Evaluation Includes:   I reviewed the results below. Exercise cardiolite 2011 - 1. Objectively normal treadmill stress test (patient reached only 79% of  maximal predicted heart rate, thus decreasing the sensitivity of the test). His max workload was 9.1 METS. 2. Atypical, mild chest pain.  3. Normal exercise gated myocardial perfusion study without inducible  Ischemia. 4. Normal systolic function with an left ventricular ejection fraction of   77%.    Echo 5/21/18 - LVEF 60%.  RV normal.  Mild MR.  AV sclerosis   Lexiscan Cardiolite 5/21/18 - normal MPI, LVEF 68%.      Event Monitor 10/2/18 - Afib at at 6:30 PM (-110) and at 7 PM (HR 80-90 bpm)       Event Monitor 1/7/19-1/20/19 - Had multiple episodes of Afib with RVR;  Did have Afib with -130 on 1/11, 1/17, and 1/19.           EKG 5/1/18 - Afib with RVR,   EKG 5/1/18 - NSR, normal   EKG 12/10/18 - NSR, normal  EKG 12/27/18 - Afib,. HR 79  EKG 1/18/19 - NSR, normal.  HR 68. EKG 2/21/19 - NSR, normal   EKG 6/16/20 - sinus kenny, normal   EKG 6/24/21 - sinus kenny, normal         ASSESSMENT AND PLAN:     Assessment and Plan:    1) PAF  - Afib 5/1/18 on ER presentation - converted to NSR on his own  - He had 10 episodes of symptoms prior then   - [id number] score is 3 (has 5% stroke risk per year)   - Continue Eliquis 5 mg BID   - Event Monitor 10/2/18 - Afib at at 6:30 PM (-110) and at 7 PM (HR 80-90 bpm)  - Eventually increased Cardizem and added flecainide to manage Afib.  He has seen Dr. Sanjana Murphy   - On 12/27/18 he was in ER for weakness - was in Afib then    - On 6/24/21 - He is doing well overall. Will continue cardizem, metoprolol, flecainide. Continue eliquis. No notable afib recurrences.    2) History of chest pain   - he has many CAD risk factors   - Lexiscan Cardiolite 5/21/18 - normal MPI, LVEF 68%.    - Symptoms got better with Tums and is now playing golf without chest pain   - No CP lately       3) HTN  - BP looks good  - continue meds       4) Dyslipidemia  - Cont Crestor   - prior lipids OK       5) Anemia  - Records from Dr. Sherry Kessler reviewed - her note states that patient had iron deficiency anemia - but had negative GI workup with Dr. Verito Monson and had multiple stools that were negative heme. - hgb has been stable on iron   - follow CBC on Eliquis    - Now seeing Dr. Lance Coello (Heme)      6) See me in 12 month.   Patient expressed understanding of the plan - questions were answered.      Vacationed in [facility] [employer] retired. Plays golf.             Juliette Cabrera MD, [address], [address]  [address]  [address], [address] [place] [address]  Ph: [phone]   Ph [phone]

## 2021-06-24 NOTE — PROGRESS NOTES
Obdulio Medrano is a 70 y.o. male    Visit Vitals  /78 (BP 1 Location: Left upper arm, BP Patient Position: Sitting, BP Cuff Size: Adult)   Pulse (!) 50   Ht 5' 10\" (1.778 m)   Wt 257 lb (116.6 kg)   SpO2 96%   BMI 36.88 kg/m²       Chief Complaint   Patient presents with    Hypertension    Other     AF       Chest pain NO  SOB NO  Dizziness NO  Swelling NO  Recent hospital visit NO  Refills NO

## 2021-07-19 RX ORDER — METOPROLOL TARTRATE 25 MG/1
TABLET, FILM COATED ORAL
Qty: 180 TABLET | Refills: 0 | Status: SHIPPED | OUTPATIENT
Start: 2021-07-19 | End: 2021-11-12

## 2021-11-01 NOTE — TELEPHONE ENCOUNTER
Patient has 3 pills left of the flecainide (TAMBOCOR) 100 mg tablet.        **Patient is requesting a temporary refill request also be sent to [place] as he will not get the mail order in time**       Robert Phone: [phone]        Patient also request if Dr. Nicole Mcgrath can [place] the following [place] [facility] [place] to refill:     dilTIAZem ER (CARDIZEM CD) 240 mg capsule    Eliquis 5 mg tablet    flecainide (TAMBOCOR) 100 mg tablet          Phone: [phone]

## 2021-11-02 RX ORDER — DILTIAZEM HYDROCHLORIDE 240 MG/1
240 CAPSULE, COATED, EXTENDED RELEASE ORAL DAILY
Qty: 90 CAPSULE | Refills: 3 | Status: SHIPPED | OUTPATIENT
Start: 2021-11-02 | End: 2022-02-14 | Stop reason: SDUPTHER

## 2021-11-02 RX ORDER — FLECAINIDE ACETATE 100 MG/1
100 TABLET ORAL 2 TIMES DAILY
Qty: 60 TABLET | Refills: 1 | Status: SHIPPED | OUTPATIENT
Start: 2021-11-02 | End: 2022-02-14 | Stop reason: SDUPTHER

## 2021-11-02 NOTE — TELEPHONE ENCOUNTER
Refill per VO of Dr. Liat Figueroa  Last appt: 6/24/21  Future Appointments   Date Time Provider Chris Funezi   6/30/2022 11:40 AM Carla Mike MD [facility]       Requested Prescriptions     Signed Prescriptions Disp Refills    flecainide (TAMBOCOR) 100 mg tablet 60 Tablet 1     Sig: Take 1 Tablet by mouth two (2) times a day. Authorizing Provider: Gil Lemus     Ordering User: Chris Joseph    dilTIAZem ER (CARDIZEM CD) 240 mg capsule 90 Capsule 3     Sig: Take 1 Capsule by mouth daily.      Authorizing Provider: Gil Lemus     Ordering User: Chris Joseph

## 2021-11-03 RX ORDER — FLECAINIDE ACETATE 100 MG/1
TABLET ORAL
Qty: 180 TABLET | OUTPATIENT
Start: 2021-11-03

## 2021-11-03 NOTE — TELEPHONE ENCOUNTER
Refill per VO of Dr. Fab Moyer  Last appt: Visit date not found  Future Appointments   Date Time Provider Chris Keys   6/30/2022 11:40 AM Genia Ltot MD [facility]       Requested Prescriptions     Pending Prescriptions Disp Refills    flecainide (TAMBOCOR) 100 mg tablet [Pharmacy Med Name: FLECAINIDE 100MG TABLETS] 180 Tablet      Sig: TAKE 1 TABLET BY MOUTH TWICE DAILY     Refill was denied because,  ALREADY FILLED RECENTLY

## 2021-11-12 RX ORDER — METOPROLOL TARTRATE 25 MG/1
TABLET, FILM COATED ORAL
Qty: 180 TABLET | Refills: 0 | Status: SHIPPED | OUTPATIENT
Start: 2021-11-12 | End: 2022-02-01 | Stop reason: SDUPTHER

## 2021-12-28 RX ORDER — FLECAINIDE ACETATE 100 MG/1
TABLET ORAL
Qty: 180 TABLET | OUTPATIENT
Start: 2021-12-28

## 2021-12-29 NOTE — TELEPHONE ENCOUNTER
Refill per VO of Dr. Rosales Means  Last appt: Visit date not found  Future Appointments   Date Time Provider Chris Funezi   6/30/2022 11:40 AM Kyle Sifuentes MD [facility]       Requested Prescriptions     Pending Prescriptions Disp Refills    flecainide (TAMBOCOR) 100 mg tablet [Pharmacy Med Name: FLECAINIDE 100MG TABLETS] 180 Tablet      Sig: TAKE 1 TABLET BY MOUTH TWICE DAILY     Refill was denied because,  Pt using mail order

## 2022-01-13 ENCOUNTER — DOCUMENTATION ONLY (OUTPATIENT)
Dept: CARDIOLOGY CLINIC | Age: 72
End: 2022-01-13

## 2022-01-13 NOTE — PROGRESS NOTES
Fax received from [facility] for clearance. Procedure: Endoscopy procedure  Dr: Samira Borden  Medication requested to hold: Eliquobey    Fax to: [phone]

## 2022-02-01 RX ORDER — METOPROLOL TARTRATE 25 MG/1
25 TABLET, FILM COATED ORAL 2 TIMES DAILY
Qty: 180 TABLET | Refills: 1 | Status: SHIPPED | OUTPATIENT
Start: 2022-02-01 | End: 2022-02-14 | Stop reason: SDUPTHER

## 2022-02-01 NOTE — TELEPHONE ENCOUNTER
Refill per VO of Dr. Ted Grajeda  Last appt: 6/24/2021  Future Appointments   Date Time Provider Chris Ludmila   6/30/2022 11:40 AM Shirley Dorantes MD [facility]       Requested Prescriptions     Signed Prescriptions Disp Refills    metoprolol tartrate (LOPRESSOR) 25 mg tablet 180 Tablet 1     Sig: Take 1 Tablet by mouth two (2) times a day.      Authorizing Provider: Darcie Duncan     Ordering User: Vicky Blount

## 2022-02-14 RX ORDER — METOPROLOL TARTRATE 25 MG/1
25 TABLET, FILM COATED ORAL 2 TIMES DAILY
Qty: 180 TABLET | Refills: 1 | Status: SHIPPED | OUTPATIENT
Start: 2022-02-14 | End: 2022-04-29 | Stop reason: SDUPTHER

## 2022-02-14 RX ORDER — DILTIAZEM HYDROCHLORIDE 240 MG/1
240 CAPSULE, COATED, EXTENDED RELEASE ORAL DAILY
Qty: 90 CAPSULE | Refills: 1 | Status: SHIPPED | OUTPATIENT
Start: 2022-02-14 | End: 2022-04-29 | Stop reason: SDUPTHER

## 2022-02-14 RX ORDER — FLECAINIDE ACETATE 100 MG/1
100 TABLET ORAL 2 TIMES DAILY
Qty: 180 TABLET | Refills: 1 | Status: SHIPPED | OUTPATIENT
Start: 2022-02-14 | End: 2022-04-29 | Stop reason: SDUPTHER

## 2022-02-14 NOTE — TELEPHONE ENCOUNTER
Patient is calling because he needs a refill on his flecianide 100 mg,Metoproplol tartrate 25 mg,diltiazem  mg. Patient is out of his medicine and low on his medicine. Pharmacy confirmed.     [phone]

## 2022-02-14 NOTE — TELEPHONE ENCOUNTER
Refill per VO of Dr. Vimal Marmolejo  Last appt: 6/24/2021  Future Appointments   Date Time Provider Chris Funezi   6/30/2022 11:40 AM [facility]       Requested Prescriptions     Signed Prescriptions Disp Refills    flecainide (TAMBOCOR) 100 mg tablet 180 Tablet 1     Sig: Take 1 Tablet by mouth two (2) times a day. Authorizing Provider: Shavonne Valentin     Ordering User: Rock Kelly    dilTIAZem ER (CARDIZEM CD) 240 mg capsule 90 Capsule 1     Sig: Take 1 Capsule by mouth daily. Authorizing Provider: Shavonne Valentin     Ordering User: Jigar [place] metoprolol tartrate (LOPRESSOR) 25 mg tablet 180 Tablet 1     Sig: Take 1 Tablet by mouth two (2) times a day.      Authorizing Provider: Shavonne Valentin     Ordering User: Rock Kelly

## 2022-03-19 PROBLEM — E66.01 SEVERE OBESITY (HCC): Status: ACTIVE | Noted: 2019-01-10

## 2022-04-28 RX ORDER — DILTIAZEM HYDROCHLORIDE 240 MG/1
240 CAPSULE, COATED, EXTENDED RELEASE ORAL DAILY
Qty: 90 CAPSULE | Refills: 1 | OUTPATIENT
Start: 2022-04-28

## 2022-04-28 NOTE — TELEPHONE ENCOUNTER
Refill per VO of Dr. Elsi Wright  Last appt: 6/24/2021  Future Appointments   Date Time Provider Chris Funezi   6/30/2022 11:40 AM Job MD Natalie [facility]       Requested Prescriptions     Refused Prescriptions Disp Refills    dilTIAZem ER (CARDIZEM CD) 240 mg capsule 90 Capsule 1     Sig: Take 1 Capsule by mouth daily.      Refused By: Mery Colon     Reason for Refusal: Patient has requested refill too soon

## 2022-05-04 RX ORDER — METOPROLOL TARTRATE 25 MG/1
25 TABLET, FILM COATED ORAL 2 TIMES DAILY
Qty: 180 TABLET | Refills: 1 | Status: SHIPPED | OUTPATIENT
Start: 2022-05-04 | End: 2022-10-24

## 2022-05-04 RX ORDER — FLECAINIDE ACETATE 100 MG/1
100 TABLET ORAL 2 TIMES DAILY
Qty: 180 TABLET | Refills: 1 | Status: SHIPPED | OUTPATIENT
Start: 2022-05-04 | End: 2022-08-09

## 2022-05-04 RX ORDER — DILTIAZEM HYDROCHLORIDE 240 MG/1
240 CAPSULE, COATED, EXTENDED RELEASE ORAL DAILY
Qty: 90 CAPSULE | Refills: 1 | Status: SHIPPED | OUTPATIENT
Start: 2022-05-04 | End: 2022-06-30

## 2022-05-04 NOTE — TELEPHONE ENCOUNTER
Refill per VO of Dr. Casey Kiser  Last appt: 6/24/2021  Future Appointments   Date Time Provider Chris Keys   6/30/2022 11:40 AM Kieran Hernandez MD [facility]       Requested Prescriptions     Signed Prescriptions Disp Refills    dilTIAZem ER (CARDIZEM CD) 240 mg capsule 90 Capsule 1     Sig: Take 1 Capsule by mouth daily. Authorizing Provider: Bar Mortensen     Ordering User: Claribel Soto metoprolol tartrate (LOPRESSOR) 25 mg tablet 180 Tablet 1     Sig: Take 1 Tablet by mouth two (2) times a day. Authorizing Provider: Bar Mortensen     Ordering User: Claribel Soto flecainide (TAMBOCOR) 100 mg tablet 180 Tablet 1     Sig: Take 1 Tablet by mouth two (2) times a day.      Authorizing Provider: Bar Mortensen     Ordering User: Beni Stratton

## 2022-06-30 ENCOUNTER — OFFICE VISIT (OUTPATIENT)
Dept: CARDIOLOGY CLINIC | Age: 72
End: 2022-06-30
Payer: MEDICARE

## 2022-06-30 VITALS
BODY MASS INDEX: 34.36 KG/M2 | HEART RATE: 56 BPM | HEIGHT: 70 IN | OXYGEN SATURATION: 96 % | SYSTOLIC BLOOD PRESSURE: 120 MMHG | WEIGHT: 240 LBS | DIASTOLIC BLOOD PRESSURE: 60 MMHG

## 2022-06-30 DIAGNOSIS — I48.0 PAROXYSMAL ATRIAL FIBRILLATION (HCC): Primary | ICD-10-CM

## 2022-06-30 DIAGNOSIS — I10 ESSENTIAL HYPERTENSION: ICD-10-CM

## 2022-06-30 DIAGNOSIS — E78.5 HYPERLIPIDEMIA, UNSPECIFIED HYPERLIPIDEMIA TYPE: ICD-10-CM

## 2022-06-30 PROCEDURE — G8754 DIAS BP LESS 90: HCPCS | Performed by: SPECIALIST

## 2022-06-30 PROCEDURE — 1101F PT FALLS ASSESS-DOCD LE1/YR: CPT | Performed by: SPECIALIST

## 2022-06-30 PROCEDURE — G8752 SYS BP LESS 140: HCPCS | Performed by: SPECIALIST

## 2022-06-30 PROCEDURE — 3017F COLORECTAL CA SCREEN DOC REV: CPT | Performed by: SPECIALIST

## 2022-06-30 PROCEDURE — G8417 CALC BMI ABV UP PARAM F/U: HCPCS | Performed by: SPECIALIST

## 2022-06-30 PROCEDURE — G8536 NO DOC ELDER MAL SCRN: HCPCS | Performed by: SPECIALIST

## 2022-06-30 PROCEDURE — 1123F ACP DISCUSS/DSCN MKR DOCD: CPT | Performed by: SPECIALIST

## 2022-06-30 PROCEDURE — 93000 ELECTROCARDIOGRAM COMPLETE: CPT | Performed by: SPECIALIST

## 2022-06-30 PROCEDURE — 99214 OFFICE O/P EST MOD 30 MIN: CPT | Performed by: SPECIALIST

## 2022-06-30 PROCEDURE — G8432 DEP SCR NOT DOC, RNG: HCPCS | Performed by: SPECIALIST

## 2022-06-30 PROCEDURE — G8427 DOCREV CUR MEDS BY ELIG CLIN: HCPCS | Performed by: SPECIALIST

## 2022-06-30 RX ORDER — DILTIAZEM HYDROCHLORIDE 180 MG/1
180 CAPSULE, COATED, EXTENDED RELEASE ORAL DAILY
Qty: 90 CAPSULE | Refills: 3 | Status: SHIPPED | OUTPATIENT
Start: 2022-06-30

## 2022-06-30 NOTE — PROGRESS NOTES
Chief Complaint   Patient presents with    Follow-up     Annual     Cholesterol Problem    Hypertension    Other     A - Fib      Visit Vitals  /60 (BP 1 Location: Left upper arm, BP Patient Position: Sitting)   Pulse (!) 56   Ht 5' 10\" (1.778 m)   Wt 240 lb (108.9 kg)   SpO2 96%   BMI 34.44 kg/m²     Chest pain denied   SOB denied   Palpitations denied   Swelling in hands/feet denied   Dizziness denied   Recent hospital stays denied   Refills denied

## 2022-06-30 NOTE — PROGRESS NOTES
Garrick Clifton MD. [facility]              Patient: Rosa Frances  : 1950      Today's Date: 2022          HISTORY OF PRESENT ILLNESS:     History of Present Illness:  Is doing OK overall. 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 BP borderline low at times. PAST MEDICAL HISTORY:     Past Medical History:   Diagnosis Date    Atrial fibrillation ([address]     Afib 18 - spont converted to NSR    BPH (benign prostatic hyperplasia)     COPD (chronic obstructive pulmonary disease) (HCC)     mild COPD - 30 pack year smoking     Diabetes mellitus     Hernia of unspecified site of abdominal cavity without mention of obstruction or gangrene     High cholesterol     Hypertension     Iron deficiency anemia     JW visit     Obesity     Sleep apnea     uses CPAP         Past Surgical History:   Procedure Laterality Date    RECTUM SURGERY PROCEDURE UNLISTED           MEDICATIONS:     Current Outpatient Medications   Medication Sig Dispense Refill    dilTIAZem ER (CARDIZEM CD) 240 mg capsule Take 1 Capsule by mouth daily. 90 Capsule 1    metoprolol tartrate (LOPRESSOR) 25 mg tablet Take 1 Tablet by mouth two (2) times a day. 180 Tablet 1    flecainide (TAMBOCOR) 100 mg tablet Take 1 Tablet by mouth two (2) times a day. 180 Tablet 1    Eliquis 5 mg tablet TAKE 1 TABLET BY MOUTH TWICE DAILY 180 Tablet 3    pioglitazone (ACTOS) 30 mg tablet Take  by mouth daily.  tiotropium-olodateroL (Stiolto Respimat) 2.5-2.5 mcg/actuation inhaler Take 2 Puffs by inhalation daily.  sodium chloride (SALINE NASAL NA) by Nasal route as needed.  sertraline (ZOLOFT) 100 mg tablet Take 100 mg by mouth daily.  montelukast (SINGULAIR) 10 mg tablet Take 10 mg by mouth daily.  iron polysaccharide complex (POLY-IRON PO) Take 130 mg by mouth every three (3) days. 2 tablets every 3 days      insulin NPH human isophane (NOVOLIN N SC) 25 Units by SubCUTAneous route daily.       ascorbic acid, vitamin C, (VITAMIN C) 500 mg tablet Take  by mouth every seven (7) days.  ergocalciferol (ERGOCALCIFEROL) 50,000 unit capsule Take 50,000 Units by mouth every seven (7) days. Indications: PREVENTION OF VITAMIN D DEFICIENCY      cyanocobalamin 1,000 mcg tablet Take 1,000 mcg by mouth every seven (7) days. Indications: PREVENTION OF VITAMIN B12 DEFICIENCY      albuterol (PROAIR HFA) 90 mcg/actuation inhaler Take  by inhalation as needed.  omeprazole (PRILOSEC) 40 mg capsule Take 40 mg by mouth two (2) times a day.  tamsulosin (FLOMAX) 0.4 mg capsule Take 0.4 mg by mouth two (2) times a day.  rosuvastatin (CRESTOR) 40 mg tablet Take 40 mg by mouth daily. NON FORMULARY          No Known Allergies        SOCIAL HISTORY:     Social History     Tobacco Use    Smoking status: Former Smoker     Quit date: 6/3/1990     Years since quittin.0    Smokeless tobacco: Never Used   Substance Use Topics    Alcohol use: Yes     Comment: 3 drinks per night    Drug use: No       FAMILY HISTORY:     Family History   Problem Relation Age of Onset    Stroke Mother            REVIEW OF SYMPTOMS:     Review of Symptoms:  Constitutional: Negative for fever, chills  HEENT: Negative for nosebleeds, tinnitus, and vision changes. Respiratory: Negative for cough, wheezing  Cardiovascular: Negative for orthopnea, claudication, syncope, and PND. Gastrointestinal: Negative for abdominal pain, diarrhea, melena. Genitourinary: Negative for dysuria  Musculoskeletal: Negative for myalgias. Skin: Negative for rash  Heme: No problems bleeding. Neurological: Negative for speech change and focal weakness. PHYSICAL EXAM:     Physical Exam:  Visit Vitals  /60 (BP 1 Location: Left upper arm, BP Patient Position: Sitting)   Pulse (!) 56   Ht 5' 10\" (1.778 m)   Wt 240 lb (108.9 kg)   SpO2 96%   BMI 34.44 kg/m²     Patient appears generally well, mood and affect are appropriate and pleasant.   HEENT:  Hearing intact, non-icteric, normocephalic, atraumatic. Neck Exam: Supple, No JVD   Lung Exam: Clear to auscultation, even breath sounds. Cardiac Exam: Regular rate and rhythm with 2/6 systolic murmur    Abdomen: Soft, non-tender, normal bowel sounds. Obese   Extremities: Moves all ext well. No lower extremity edema. MSKTL: Overall good ROM ext  Skin: No significant rashes  Psych: Appropriate affect  Neuro - Grossly intact        LABS / OTHER STUDIES reviewed:       Lab Results   Component Value Date/Time    Sodium 144 03/01/2019 11:20 AM    Potassium 4.9 03/01/2019 11:20 AM    Chloride 106 03/01/2019 11:20 AM    CO2 23 03/01/2019 11:20 AM    Anion gap 13 12/27/2018 08:02 AM    Glucose 121 (H) 03/01/2019 11:20 AM    BUN 15 03/01/2019 11:20 AM    Creatinine 0.98 03/01/2019 11:20 AM    BUN/Creatinine ratio 15 03/01/2019 11:20 AM    GFR est AA 91 03/01/2019 11:20 AM    GFR est non-AA 79 03/01/2019 11:20 AM    Calcium 9.2 03/01/2019 11:20 AM    Bilirubin, total 0.2 12/27/2018 08:02 AM    Alk. phosphatase 71 12/27/2018 08:02 AM    Protein, total 7.2 12/27/2018 08:02 AM    Albumin 3.7 12/27/2018 08:02 AM    Globulin 3.5 12/27/2018 08:02 AM    A-G Ratio 1.1 12/27/2018 08:02 AM    ALT (SGPT) 26 12/27/2018 08:02 AM    AST (SGOT) 23 12/27/2018 08:02 AM     Lab Results   Component Value Date/Time    WBC 5.2 03/01/2019 11:20 AM    HGB 10.0 (L) 03/01/2019 11:20 AM    HCT 32.3 (L) 03/01/2019 11:20 AM    PLATELET 432 18/67/3856 11:20 AM    MCV 85 03/01/2019 11:20 AM       Lab Results   Component Value Date/Time    Cholesterol, total 113 12/12/2011 06:15 AM    HDL Cholesterol 37 12/12/2011 06:15 AM    LDL, calculated 11.8 12/12/2011 06:15 AM    VLDL, calculated 64.2 12/12/2011 06:15 AM    Triglyceride 321 (H) 12/12/2011 06:15 AM    CHOL/HDL Ratio 3.1 12/12/2011 06:15 AM     Labs 1/21 - CBC, BMP, and lipids OK       CARDIAC DIAGNOSTICS:     Cardiac Evaluation Includes:  I reviewed the results below. Exercise cardiolite 2011 - 1. Objectively normal treadmill stress test (patient reached only 79% of  maximal predicted heart rate, thus decreasing the sensitivity of the test). His max workload was 9.1 METS. 2. Atypical, mild chest pain.  3. Normal exercise gated myocardial perfusion study without inducible  Ischemia. 4. Normal systolic function with an left ventricular ejection fraction of   77%.    Echo 5/21/18 - LVEF 60%.  RV normal.  Mild MR.  AV sclerosis   Lexiscan Cardiolite 5/21/18 - normal MPI, LVEF 68%.      Event Monitor 10/2/18 - Afib at at 6:30 PM (-110) and at 7 PM (HR 80-90 bpm)       Event Monitor 1/7/19-1/20/19 - Had multiple episodes of Afib with RVR;  Did have Afib with -130 on 1/11, 1/17, and 1/19.           EKG 5/1/18 - Afib with RVR,   EKG 5/1/18 - NSR, normal   EKG 12/10/18 - NSR, normal  EKG 12/27/18 - Afib,. HR 79  EKG 1/18/19 - NSR, normal.  HR 68. EKG 2/21/19 - NSR, normal   EKG 6/16/20 - sinus kenny, normal   EKG 6/24/21 - sinus kenny, normal   EKG 6/3022 - sinus, normal       ASSESSMENT AND PLAN:     Assessment and Plan:    1) PAF  - Afib 5/1/18 on ER presentation - converted to NSR on his own  - He had 10 episodes of symptoms prior then   - [id number] score is 3 (has 5% stroke risk per year)   - Continue Eliquis 5 mg BID   - Event Monitor 10/2/18 - Afib at at 6:30 PM (-110) and at 7 PM (HR 80-90 bpm)  - Eventually increased Cardizem and added flecainide to manage Afib.  He has seen Dr. Dasia Gomez   - On 12/27/18 he was in ER for weakness - was in Afib then    - On 6/30/22 - He is doing well overall. Will continue cardizem, metoprolol, flecainide. Continue eliquis. No notable afib recurrences. - he is concerned about BP being borderline low and some fatigue ---> Will reduce Cardizem to 180 mg daily      2) History of chest pain   - he has many CAD risk factors   - Lexiscan Cardiolite 5/21/18 - normal MPI, LVEF 68%.    - Symptoms got better with Tums and is now playing golf without chest pain   - No CP lately   - he feels well lately - remains active playing golf       3) HTN  - BP looks OK  - continue meds       4) Dyslipidemia  - Cont Crestor   - prior lipids OK       5) Anemia  - Records from Dr. Sammy Rosado reviewed - her note states that patient had iron deficiency anemia - but had negative GI workup with Dr. Ernesto House and had multiple stools that were negative heme. - hgb has been stable on iron   - follow CBC on Eliquis    - Now seeing Dr. Daniel Snell (Heme)     6) DEAN on CPAP     7) See me in 12 month with an echo then (systolic murmur).       Vacationed in [address].  Is retired. Plays golf.             Curtis Waters MD, [address], [address]  [address]  Ph: [phone]   Ph [phone]

## 2022-08-09 RX ORDER — FLECAINIDE ACETATE 100 MG/1
TABLET ORAL
Qty: 180 TABLET | Refills: 3 | Status: SHIPPED | OUTPATIENT
Start: 2022-08-09

## 2022-08-09 NOTE — TELEPHONE ENCOUNTER
Refill per VO of Dr. Can Phelps  Last appt: 6/30/2022  Future Appointments   Date Time Provider Chris Keys   7/3/2023 11:00 AM Hang Mancia MD [facility]       Requested Prescriptions     Signed Prescriptions Disp Refills    flecainide (TAMBOCOR) 100 mg tablet 180 Tablet 3     Sig: TAKE 1 TABLET BY MOUTH TWICE DAILY     Authorizing Provider: Leeann Lay     Ordering User: Roly Puga

## 2022-08-12 NOTE — TELEPHONE ENCOUNTER
Refill per VO of Dr. Daphne Quintanilla  Last appt: Visit date not found  Future Appointments   Date Time Provider Chris Keys   7/3/2023 11:00 AM Brandi Ybarra MD [facility]       Requested Prescriptions     Pending Prescriptions Disp Refills    Eliquis 5 mg tablet [Pharmacy Med Name: ELIQUIS 5MG TABLETS] 180 Tablet 3     Sig: TAKE 1 TABLET BY MOUTH TWICE DAILY

## 2022-08-15 RX ORDER — APIXABAN 5 MG/1
TABLET, FILM COATED ORAL
Qty: 180 TABLET | Refills: 3 | Status: SHIPPED | OUTPATIENT
Start: 2022-08-15 | End: 2022-08-18

## 2022-08-17 ENCOUNTER — TELEPHONE (OUTPATIENT)
Dept: CARDIOLOGY CLINIC | Age: 72
End: 2022-08-17

## 2022-08-18 NOTE — TELEPHONE ENCOUNTER
Refill per VO of Dr. Tavon Rivera  Last appt: 6/30/2022  Future Appointments   Date Time Provider Chris Ludmila   7/3/2023 11:00 AM Isaac Cohen MD [facility]       Requested Prescriptions     Signed Prescriptions Disp Refills    rivaroxaban (Xarelto) 20 mg tab tablet 90 Tablet 3     Sig: Take 1 Tablet by mouth daily (with dinner). Authorizing Provider: Kay Gilmore     Ordering User: Link Golden     Will sent printed prescription to [phone] once signed by provider.

## 2022-08-24 NOTE — TELEPHONE ENCOUNTER
Patient is calling to request a refill for Xarelto 20mg , pt has been out of medication for a week and the medication has been sent to the wrong pharmacy . Please Advise. Pharmacy Verified     [address] [place] [address] [address]    Overnight Order Possibly ?     [phone]

## 2022-08-24 NOTE — TELEPHONE ENCOUNTER
Refill per VO of Dr. Kenneth Gonzalezers  Last appt: 6/30/22  Future Appointments   Date Time Provider Chris Ludmila   7/3/2023 11:00 AM Dejan Loya MD [facility]       Requested Prescriptions     Signed Prescriptions Disp Refills    rivaroxaban (Xarelto) 20 mg tab tablet 90 Tablet 3     Sig: Take 1 Tablet by mouth daily (with dinner).      Authorizing Provider: Angelika Woods     Ordering User: Brian Rodríguez

## 2022-10-24 RX ORDER — METOPROLOL TARTRATE 25 MG/1
TABLET, FILM COATED ORAL
Qty: 180 TABLET | Refills: 3 | Status: SHIPPED | OUTPATIENT
Start: 2022-10-24

## 2022-10-24 NOTE — TELEPHONE ENCOUNTER
Refill per VO of Dr. Zonia Cockayne  Last appt: 6/30/2022  Future Appointments   Date Time Provider Chris Ludmila   7/3/2023 11:00 AM Jeff Powell MD [facility]       Requested Prescriptions     Signed Prescriptions Disp Refills    metoprolol tartrate (LOPRESSOR) 25 mg tablet 180 Tablet 3     Sig: TAKE 1 TABLET BY MOUTH TWICE DAILY     Authorizing Provider: Milagros Garvey     Ordering User: Shiar Huertas

## 2023-01-18 ENCOUNTER — TELEPHONE (OUTPATIENT)
Dept: FAMILY MEDICINE CLINIC | Age: 73
End: 2023-01-18

## 2023-01-23 ENCOUNTER — PATIENT MESSAGE (OUTPATIENT)
Dept: CARDIOLOGY CLINIC | Age: 73
End: 2023-01-23

## 2023-02-14 ENCOUNTER — OFFICE VISIT (OUTPATIENT)
Dept: FAMILY MEDICINE CLINIC | Age: 73
End: 2023-02-14
Payer: MEDICARE

## 2023-02-14 VITALS
OXYGEN SATURATION: 100 % | DIASTOLIC BLOOD PRESSURE: 49 MMHG | HEIGHT: 70 IN | BODY MASS INDEX: 35.65 KG/M2 | SYSTOLIC BLOOD PRESSURE: 115 MMHG | WEIGHT: 249 LBS | HEART RATE: 50 BPM

## 2023-02-14 DIAGNOSIS — N40.0 BENIGN PROSTATIC HYPERPLASIA, UNSPECIFIED WHETHER LOWER URINARY TRACT SYMPTOMS PRESENT: ICD-10-CM

## 2023-02-14 DIAGNOSIS — I48.91 ATRIAL FIBRILLATION, UNSPECIFIED TYPE (HCC): ICD-10-CM

## 2023-02-14 DIAGNOSIS — E11.9 TYPE 2 DIABETES MELLITUS WITHOUT COMPLICATION, UNSPECIFIED WHETHER LONG TERM INSULIN USE (HCC): Primary | ICD-10-CM

## 2023-02-14 DIAGNOSIS — J44.9 CHRONIC OBSTRUCTIVE PULMONARY DISEASE, UNSPECIFIED COPD TYPE (HCC): ICD-10-CM

## 2023-02-14 DIAGNOSIS — Z13.9 SCREENING DUE: ICD-10-CM

## 2023-02-14 DIAGNOSIS — I10 PRIMARY HYPERTENSION: ICD-10-CM

## 2023-02-14 DIAGNOSIS — E78.5 HYPERLIPIDEMIA, UNSPECIFIED HYPERLIPIDEMIA TYPE: ICD-10-CM

## 2023-02-14 LAB
ALBUMIN UR QL STRIP: 80 MG/L
CREATININE, URINE POC: 300 MG/DL
MICROALBUMIN/CREAT RATIO POC: NORMAL MG/G

## 2023-02-14 PROCEDURE — 2022F DILAT RTA XM EVC RTNOPTHY: CPT | Performed by: FAMILY MEDICINE

## 2023-02-14 PROCEDURE — G8427 DOCREV CUR MEDS BY ELIG CLIN: HCPCS | Performed by: FAMILY MEDICINE

## 2023-02-14 PROCEDURE — 1101F PT FALLS ASSESS-DOCD LE1/YR: CPT | Performed by: FAMILY MEDICINE

## 2023-02-14 PROCEDURE — 3078F DIAST BP <80 MM HG: CPT | Performed by: FAMILY MEDICINE

## 2023-02-14 PROCEDURE — 3017F COLORECTAL CA SCREEN DOC REV: CPT | Performed by: FAMILY MEDICINE

## 2023-02-14 PROCEDURE — 3044F HG A1C LEVEL LT 7.0%: CPT | Performed by: FAMILY MEDICINE

## 2023-02-14 PROCEDURE — G8417 CALC BMI ABV UP PARAM F/U: HCPCS | Performed by: FAMILY MEDICINE

## 2023-02-14 PROCEDURE — 99204 OFFICE O/P NEW MOD 45 MIN: CPT | Performed by: FAMILY MEDICINE

## 2023-02-14 PROCEDURE — 1123F ACP DISCUSS/DSCN MKR DOCD: CPT | Performed by: FAMILY MEDICINE

## 2023-02-14 PROCEDURE — G8536 NO DOC ELDER MAL SCRN: HCPCS | Performed by: FAMILY MEDICINE

## 2023-02-14 PROCEDURE — G8510 SCR DEP NEG, NO PLAN REQD: HCPCS | Performed by: FAMILY MEDICINE

## 2023-02-14 PROCEDURE — 3074F SYST BP LT 130 MM HG: CPT | Performed by: FAMILY MEDICINE

## 2023-02-14 PROCEDURE — 82044 UR ALBUMIN SEMIQUANTITATIVE: CPT | Performed by: FAMILY MEDICINE

## 2023-02-14 NOTE — PROGRESS NOTES
[facility] [address] [place] [address]   Office [phone], Fax [phone]      Chief Complaint:     Josiane Lewis is a 67 y.o. male that presents for:  Chief Complaint   Patient presents with    [facility]       Subjective:   HPI:  Josiane Lewis is an 67 y.o. male who presents to [facility], and      Patient was previously receiving care with Rodney Burks DO        Retired. Plays golf   Current complaints include:  - ***          Review of Systems   ROS  All other systems reviewed and are negative. Health Maintenance:  Health Maintenance Due   Topic Date Due    Hepatitis C Screening  Never done    Depression Screen  Never done    COVID-19 Vaccine (1) Never done    Pneumococcal 65+ years (1 - PCV) Never done    Foot Exam Q1  Never done    Eye Exam Retinal or Dilated  Never done    DTaP/Tdap/Td series (1 - Tdap) Never done    Shingles Vaccine (1 of 2) Never done    Medicare Yearly Exam  Never done    Diabetic Alb to Cr ratio (uACR) test  02/28/2019    GFR test (Diabetes, CKD 3-4, OR last GFR 15-59)  03/01/2020    A1C test (Diabetic or Prediabetic)  01/07/2022    Lipid Screen  01/07/2022    Flu Vaccine (1) Never done        Current Medications  Current medications include:   Current Outpatient Medications   Medication Sig    rivaroxaban (Xarelto) 20 mg tab tablet Take 1 Tablet by mouth daily (with dinner). metoprolol tartrate (LOPRESSOR) 25 mg tablet TAKE 1 TABLET BY MOUTH TWICE DAILY    flecainide (TAMBOCOR) 100 mg tablet TAKE 1 TABLET BY MOUTH TWICE DAILY    dilTIAZem ER (CARDIZEM CD) 180 mg capsule Take 1 Capsule by mouth daily. pioglitazone (ACTOS) 30 mg tablet Take  by mouth daily. sodium chloride (SALINE NASAL NA) by Nasal route as needed. sertraline (ZOLOFT) 100 mg tablet Take 100 mg by mouth daily. montelukast (SINGULAIR) 10 mg tablet Take 10 mg by mouth daily. iron polysaccharide complex (POLY-IRON PO) Take 130 mg by mouth every three (3) days.  2 tablets every 3 days    insulin NPH human isophane (NOVOLIN N SC) 25 Units by SubCUTAneous route daily. ascorbic acid, vitamin C, (VITAMIN C) 500 mg tablet Take  by mouth every seven (7) days. ergocalciferol (ERGOCALCIFEROL) 50,000 unit capsule Take 50,000 Units by mouth every seven (7) days. Indications: PREVENTION OF VITAMIN D DEFICIENCY    cyanocobalamin 1,000 mcg tablet Take 1,000 mcg by mouth every seven (7) days. Indications: PREVENTION OF VITAMIN B12 DEFICIENCY    albuterol (PROAIR HFA) 90 mcg/actuation inhaler Take  by inhalation as needed. omeprazole (PRILOSEC) 40 mg capsule Take 40 mg by mouth two (2) times a day. tamsulosin (FLOMAX) 0.4 mg capsule Take 0.4 mg by mouth two (2) times a day. rosuvastatin (CRESTOR) 40 mg tablet Take 40 mg by mouth daily. NON FORMULARY     tiotropium-olodateroL (Stiolto Respimat) 2.5-2.5 mcg/actuation inhaler Take 2 Puffs by inhalation daily. No current facility-administered medications for this visit.        Allergies  No Known Allergies    Past Medical History  Past Medical History:   Diagnosis Date    Atrial fibrillation ([address]     Afib 5/1/18 - spont converted to NSR    BPH (benign prostatic hyperplasia)     COPD (chronic obstructive pulmonary disease) (HCC)     mild COPD - 30 pack year smoking     Diabetes mellitus     Hernia of unspecified site of abdominal cavity without mention of obstruction or gangrene     High cholesterol     Hypertension     Iron deficiency anemia     JW visit     Obesity     Sleep apnea     uses CPAP       Past Surgical History   Past Surgical History:   Procedure Laterality Date    RECTUM SURGERY PROCEDURE UNLISTED         Family History  Family History   Problem Relation Age of Onset    Stroke Mother      Social History  Social History     Socioeconomic History    Marital status:      Spouse name: Not on file    Number of children: Not on file    Years of education: Not on file    Highest education level: Not on file Occupational History    Not on file   Tobacco Use    Smoking status: Former     Types: Cigarettes     Quit date: 6/3/1990     Years since quittin.7    Smokeless tobacco: Never   Substance and Sexual Activity    Alcohol use: Yes     Comment: 3 drinks per night    Drug use: No    Sexual activity: Not on file   Other Topics Concern    Not on file   Social History Narrative    Not on file     Social Determinants of Health     Financial Resource Strain: Not on file   Food Insecurity: Not on file   Transportation Needs: Not on file   Physical Activity: Insufficiently Active    Days of Exercise per Week: 1 day    Minutes of Exercise per Session: 10 min   Stress: Not on file   Social Connections: Not on file   Intimate Partner Violence: Not At Risk    Fear of Current or Ex-Partner: No    Emotionally Abused: No    Physically Abused: No    Sexually Abused: No   Housing Stability: Not on file       Immunizations    There is no immunization history on file for this patient. Objective:   Vitals reviewed. There were no vitals taken for this visit. Physical Exam  General: Alert. No distress. Not diaphoretic. No jaundice. No cyanosis. No pallor. HEENT: Normocephalic, atraumatic. Neck supple, no cervical adenopathy. Cardio: Normal rate, regular rhythm. No murmur, rubs, or gallop. Pulmonary: Effort normal. No accessory muscle use. No wheezes, rales, or rhonchi. Abdominal: Soft. Bowel sounds normal. No tenderness. No distension. Extremities: No edema of lower extremities. Pulses 2+. MSK: Grossly normal joint and motor function. No joint swelling or tenderness. Neurological: CN II-XII grossly intact. Normal speech, moves all extremities, normal gait. Psych: Normal mood, behavior, speech  Skin: No rash or suspicious moles.      Assessment/Plan:   Roxannakassandra Piper is a 67 y.o. here to establish to care     I personally reviewed the following Pertinent Labs/Studies:   - Encounter Notes from ***, Labs from ***, Imaging from ***      {There are no diagnoses linked to this encounter. (Refresh or delete this SmartLink)}     Follow up:        No follow-ups on file. Pt was discussed with Dr. Stanley Persaud (attending physician). I have reviewed pertinent labs results and other data. I have discussed the diagnosis with the patient and the intended plan as seen in the above orders. The patient has received an after-visit summary and questions were answered concerning future plans. I have discussed medication side effects and warnings with the patient as well.     Agustin Salazar MD  Resident [facility]  02/14/23

## 2023-02-14 NOTE — PROGRESS NOTES
Chief Complaint   Patient presents with    Establish Care     Visit Vitals  BP (!) 115/49 (BP 1 Location: Right arm, BP Patient Position: Sitting, BP Cuff Size: Adult long)   Pulse (!) 50   Ht 5' 9.5\" (1.765 m)   Wt 249 lb (112.9 kg)   SpO2 100%   BMI 36.24 kg/m²

## 2023-02-14 NOTE — PROGRESS NOTES
[address] [facility]      Chief Complaint:   Chief Complaint   Patient presents with    [facility]       SUBJECTIVE:  Gaviota Roman is a 67 y.o. male who presents to [facility]. HTN: Currently taking Metoprolol 25mg BID and Diltiazem ER 180mg daily. Home BP readings run 130s/60s-70s. Denies CP/SOB. HL: Currently taking Crestor 40mg daily. Denies side effects of the medications. Afib: Currently taking Xarelto (Riveroxaban) 20mg daily, metoprolol 25mg BID, Flecainide 100 BID, and Diltiazem  daily. Followed by cardiology. COPD: Currently taking Spiriva daily, albuterol MDI Q6h PRN, and singulair 10mg daily. Reports sx are well controlled and he rarely needs to use his albuterol. Followed by Pulm. DM: Currently taking Pioglitazone 30mg daily and NPH 25 untits QHS. Prescribed by previous PCP. Reports seeing eye doctor yearly. Fe Def Anemia:  Currently taking Fe supplement 3 times a week. BPH: Currently taking Flomax 0.4mg BID and sx controlled. GERD: Currently taking Omeprazole 40mg daily and sx controlled. Seasonal allergies: Zyrtec 10mg daily. Flonase PRN. PMHx:  Past Medical History:   Diagnosis Date    Atrial fibrillation ([address]     Afib 5/1/18 - spont converted to NSR    BPH (benign prostatic hyperplasia)     COPD (chronic obstructive pulmonary disease) (HCC)     mild COPD - 30 pack year smoking     Diabetes mellitus     Hernia of unspecified site of abdominal cavity without mention of obstruction or gangrene     High cholesterol     Hypertension     Iron deficiency anemia     JW visit     Obesity     Sleep apnea     uses CPAP       Meds:   Current Outpatient Medications   Medication Sig Dispense Refill    rivaroxaban (Xarelto) 20 mg tab tablet Take 1 Tablet by mouth daily (with dinner).  30 Tablet 3    metoprolol tartrate (LOPRESSOR) 25 mg tablet TAKE 1 TABLET BY MOUTH TWICE DAILY 180 Tablet 3    flecainide (TAMBOCOR) 100 mg tablet TAKE 1 TABLET BY MOUTH TWICE DAILY 180 Tablet 3    dilTIAZem ER (CARDIZEM CD) 180 mg capsule Take 1 Capsule by mouth daily. 90 Capsule 3    pioglitazone (ACTOS) 30 mg tablet Take  by mouth daily. sodium chloride (SALINE NASAL NA) by Nasal route as needed. sertraline (ZOLOFT) 100 mg tablet Take 100 mg by mouth daily. montelukast (SINGULAIR) 10 mg tablet Take 10 mg by mouth daily. iron polysaccharide complex (POLY-IRON PO) Take 130 mg by mouth every three (3) days. 2 tablets every 3 days      insulin NPH human isophane (NOVOLIN N SC) 25 Units by SubCUTAneous route daily. ascorbic acid, vitamin C, (VITAMIN C) 500 mg tablet Take  by mouth every seven (7) days. ergocalciferol (ERGOCALCIFEROL) 50,000 unit capsule Take 50,000 Units by mouth every seven (7) days. Indications: PREVENTION OF VITAMIN D DEFICIENCY      cyanocobalamin 1,000 mcg tablet Take 1,000 mcg by mouth every seven (7) days. Indications: PREVENTION OF VITAMIN B12 DEFICIENCY      albuterol (PROAIR HFA) 90 mcg/actuation inhaler Take  by inhalation as needed. omeprazole (PRILOSEC) 40 mg capsule Take 40 mg by mouth two (2) times a day. tamsulosin (FLOMAX) 0.4 mg capsule Take 0.4 mg by mouth two (2) times a day. rosuvastatin (CRESTOR) 40 mg tablet Take 40 mg by mouth daily. NON FORMULARY       tiotropium-olodateroL (Stiolto Respimat) 2.5-2.5 mcg/actuation inhaler Take 2 Puffs by inhalation daily.          Allergies:   No Known Allergies    Smoker:  Social History     Tobacco Use   Smoking Status Former    Types: Cigarettes    Quit date: 6/3/1990    Years since quittin.7   Smokeless Tobacco Never       ETOH:   Social History     Substance and Sexual Activity   Alcohol Use Yes    Comment: 3 drinks per night       FH:   Family History   Problem Relation Age of Onset    Stroke Mother        ROS: Per HPI    Physical Exam:  Visit Vitals  BP (!) 115/49 (BP 1 Location: Right arm, BP Patient Position: Sitting, BP Cuff Size: Adult long)   Pulse (!) 50   Ht 5' 9.5\" (1.765 m)   Wt 249 lb (112.9 kg)   SpO2 100%   BMI 36.24 kg/m²         Assessment/Plan:  Labs today. Discussed diet and exercise. HTN: Controlled. Continue taking Metoprolol 25mg BID and Diltiazem ER 180mg daily. Continue to monitor at home. HL: Continue taking Crestor 40mg daily. Afib: Controlled. Continue taking Xarelto (Riveroxaban) 20mg daily, metoprolol 25mg BID, Flecainide 100 BID, and Diltiazem  daily. Continue to follow up with cardiology. COPD: Controlled. Continue taking Spiriva daily, albuterol MDI Q6h PRN, and singulair 10mg daily. Continue to follow up with Pulm. DM: Controlled. Continue taking Pioglitazone 30mg daily and NPH 25 untits QHS. Pioglitazone prescribed by previous PCP. Will discuss alternative medications with patient at follow up visit. Fe Def Anemia:  Continue taking Fe supplement 3 times a week. BPH: Continue taking Flomax 0.4mg BID and sx controlled. GERD: Continue taking Omeprazole 40mg daily and sx controlled. Seasonal allergies: Continue Zyrtec 10mg daily. Flonase PRN. RTC: 6 months and PRN    Addendum: Labs reviewed  Component      Latest Ref Rng & Units 2/14/2023 2/14/2023 2/14/2023 2/14/2023          10:50 AM 10:50 AM 10:50 AM 10:50 AM   Glucose      70 - 99 mg/dL  128 (H)     BUN      8 - 27 mg/dL  19     Creatinine      0.76 - 1.27 mg/dL  1.02     eGFR      >59 mL/min/1.73  78     BUN/Creatinine ratio      10 - 24  19     Sodium      134 - 144 mmol/L  146 (H)     Potassium      3.5 - 5.2 mmol/L  4.4     Chloride      96 - 106 mmol/L  106     CO2      20 - 29 mmol/L  21     Calcium      8.6 - 10.2 mg/dL  9.4     Protein, total      6.0 - 8.5 g/dL  6.8     Albumin      3.7 - 4.7 g/dL  4.5     GLOBULIN, TOTAL      1.5 - 4.5 g/dL  2.3     A-G Ratio      1.2 - 2.2  2.0     Bilirubin, total      0.0 - 1.2 mg/dL  0.3     Alk. phosphatase      44 - 121 IU/L  69     AST      0 - 40 IU/L  21     ALT      0 - 44 IU/L  13     WBC      3.4 - 10.8 x10E3/uL 6.2      RBC      4.14 - 5.80 x10E6/uL 4.58      HGB      13.0 - 17.7 g/dL 14.5      HCT      37.5 - 51.0 % 42.8      MCV      79 - 97 fL 93      MCH      26.6 - 33.0 pg 31.7      MCHC      31.5 - 35.7 g/dL 33.9      RDW      11.6 - 15.4 % 12.4      PLATELET      011 - 330 x10E3/uL 177      Hemoglobin A1c, (calculated)      4.8 - 5.6 %    6.1 (H)   Estimated average glucose      mg/dL    128   TSH      0.450 - 4.500 uIU/mL   1.020      Component      Latest Ref Rng & Units 2/14/2023 2/14/2023 2/14/2023          10:50 AM 10:50 AM 10:50 AM   ALBUMIN, URINE POC      Negative mg/L   80   CREATININE, URINE POC      mg/dL   300   Microalbumin/creat ratio (POC)      <30 MG/G      Prostate Specific Ag      0.0 - 4.0 ng/mL 1.8     Hep C Virus Ab      Non Reactive  Non Reactive

## 2023-02-15 LAB
ALBUMIN SERPL-MCNC: 4.5 G/DL (ref 3.7–4.7)
ALBUMIN/GLOB SERPL: 2 {RATIO} (ref 1.2–2.2)
ALP SERPL-CCNC: 69 IU/L (ref 44–121)
ALT SERPL-CCNC: 13 IU/L (ref 0–44)
AST SERPL-CCNC: 21 IU/L (ref 0–40)
BILIRUB SERPL-MCNC: 0.3 MG/DL (ref 0–1.2)
BUN SERPL-MCNC: 19 MG/DL (ref 8–27)
BUN/CREAT SERPL: 19 (ref 10–24)
CALCIUM SERPL-MCNC: 9.4 MG/DL (ref 8.6–10.2)
CHLORIDE SERPL-SCNC: 106 MMOL/L (ref 96–106)
CO2 SERPL-SCNC: 21 MMOL/L (ref 20–29)
CREAT SERPL-MCNC: 1.02 MG/DL (ref 0.76–1.27)
EGFRCR SERPLBLD CKD-EPI 2021: 78 ML/MIN/1.73
ERYTHROCYTE [DISTWIDTH] IN BLOOD BY AUTOMATED COUNT: 12.4 % (ref 11.6–15.4)
EST. AVERAGE GLUCOSE BLD GHB EST-MCNC: 128 MG/DL
GLOBULIN SER CALC-MCNC: 2.3 G/DL (ref 1.5–4.5)
GLUCOSE SERPL-MCNC: 128 MG/DL (ref 70–99)
HBA1C MFR BLD: 6.1 % (ref 4.8–5.6)
HCT VFR BLD AUTO: 42.8 % (ref 37.5–51)
HCV IGG SERPL QL IA: NON REACTIVE
HGB BLD-MCNC: 14.5 G/DL (ref 13–17.7)
MCH RBC QN AUTO: 31.7 PG (ref 26.6–33)
MCHC RBC AUTO-ENTMCNC: 33.9 G/DL (ref 31.5–35.7)
MCV RBC AUTO: 93 FL (ref 79–97)
PLATELET # BLD AUTO: 177 X10E3/UL (ref 150–450)
POTASSIUM SERPL-SCNC: 4.4 MMOL/L (ref 3.5–5.2)
PROT SERPL-MCNC: 6.8 G/DL (ref 6–8.5)
PSA SERPL-MCNC: 1.8 NG/ML (ref 0–4)
RBC # BLD AUTO: 4.58 X10E6/UL (ref 4.14–5.8)
REFLEX CRITERIA: NORMAL
SODIUM SERPL-SCNC: 146 MMOL/L (ref 134–144)
TSH SERPL DL<=0.005 MIU/L-ACNC: 1.02 UIU/ML (ref 0.45–4.5)
WBC # BLD AUTO: 6.2 X10E3/UL (ref 3.4–10.8)

## 2023-02-17 ENCOUNTER — TELEPHONE (OUTPATIENT)
Dept: FAMILY MEDICINE CLINIC | Age: 73
End: 2023-02-17

## 2023-02-23 NOTE — TELEPHONE ENCOUNTER
----- Message from Froy Jeffries sent at 2/23/2023  4:24 PM EST -----  Subject: Message to Provider    QUESTIONS  Information for Provider? PT est Care w/ America Maharaj at [facility] on   2/14/23- April from [employer] called to request a Freestyle precision kacey   blood glucose monitor machine and test strips for pt. Pt would like this   sent to [facility] [address] [facility]. [facility] [phone] Fax # [phone]- Please let pt know   ---------------------------------------------------------------------------  --------------  [address] [facility]  [phone]; Do not leave any message, patient will call back for answer  ---------------------------------------------------------------------------  --------------  SCRIPT ANSWERS  Relationship to Patient? Third Party  Third Party Type? Pharmacy? Representative Name?  April

## 2023-03-01 RX ORDER — OMEPRAZOLE 40 MG/1
40 CAPSULE, DELAYED RELEASE ORAL 2 TIMES DAILY
Qty: 90 CAPSULE | Refills: 3 | Status: SHIPPED | OUTPATIENT
Start: 2023-03-01

## 2023-03-01 RX ORDER — DILTIAZEM HYDROCHLORIDE 180 MG/1
180 CAPSULE, COATED, EXTENDED RELEASE ORAL DAILY
Qty: 90 CAPSULE | Refills: 0 | Status: SHIPPED | OUTPATIENT
Start: 2023-03-01

## 2023-03-01 NOTE — TELEPHONE ENCOUNTER
Patient called to advise he needs a refill on diltiazem 180mg. Patient is almost out of medication.     [employer] [employer] [phone]

## 2023-03-01 NOTE — TELEPHONE ENCOUNTER
Refill per VO of Dr. Shukri Parra  Last appt: Visit date not found  Future Appointments   Date Time Provider Chris Ludmila   7/3/2023 11:00 AM Kathy Baer MD [facility] AMB       Requested Prescriptions     Pending Prescriptions Disp Refills    dilTIAZem ER (CARDIZEM CD) 180 mg capsule 90 Capsule 3     Sig: Take 1 Capsule by mouth daily.

## 2023-03-08 RX ORDER — OMEPRAZOLE 40 MG/1
40 CAPSULE, DELAYED RELEASE ORAL DAILY
Qty: 90 CAPSULE | Refills: 3 | Status: SHIPPED | OUTPATIENT
Start: 2023-03-08

## 2023-03-08 NOTE — TELEPHONE ENCOUNTER
Pt stated that the Rx for Omeprazole that was sent to pharmacy was for 45 days, since instructs him to take 2 a day. He has requested that the Rx is written as a 90 day supply.     Thank you

## 2023-03-11 ENCOUNTER — PATIENT MESSAGE (OUTPATIENT)
Dept: FAMILY MEDICINE CLINIC | Age: 73
End: 2023-03-11

## 2023-03-11 DIAGNOSIS — E11.9 TYPE 2 DIABETES MELLITUS WITHOUT COMPLICATION, UNSPECIFIED WHETHER LONG TERM INSULIN USE (HCC): Primary | ICD-10-CM

## 2023-03-17 RX ORDER — IBUPROFEN 200 MG
CAPSULE ORAL
Qty: 100 STRIP | Refills: 1 | Status: SHIPPED | OUTPATIENT
Start: 2023-03-17

## 2023-03-17 RX ORDER — LANCETS
EACH MISCELLANEOUS
Qty: 1 EACH | Refills: 11 | Status: SHIPPED | OUTPATIENT
Start: 2023-03-17

## 2023-03-17 RX ORDER — INSULIN PUMP SYRINGE, 3 ML
EACH MISCELLANEOUS
Qty: 1 KIT | Refills: 0 | Status: SHIPPED | OUTPATIENT
Start: 2023-03-17

## 2023-03-25 ENCOUNTER — PATIENT MESSAGE (OUTPATIENT)
Dept: FAMILY MEDICINE CLINIC | Age: 73
End: 2023-03-25

## 2023-03-25 DIAGNOSIS — E11.9 TYPE 2 DIABETES MELLITUS WITHOUT COMPLICATION, UNSPECIFIED WHETHER LONG TERM INSULIN USE (HCC): Primary | ICD-10-CM

## 2023-03-30 RX ORDER — INSULIN PUMP SYRINGE, 3 ML
EACH MISCELLANEOUS
Qty: 1 KIT | Refills: 0 | Status: SHIPPED | OUTPATIENT
Start: 2023-03-30

## 2023-03-30 RX ORDER — LANCETS
EACH MISCELLANEOUS
Qty: 1 EACH | Refills: 11 | Status: SHIPPED | OUTPATIENT
Start: 2023-03-30

## 2023-04-03 NOTE — TELEPHONE ENCOUNTER
Patient called needing a refill on Xarelto medication. Patient would like for the prescription to be sent to [facility]. He stated that he is almost out of the medication and will need it soon. Thanks!

## 2023-05-11 NOTE — TELEPHONE ENCOUNTER
----- Message from FIMBex Rahat sent at 5/11/2023  8:30 AM EDT -----  Subject: Refill Request    QUESTIONS  Name of Medication? tamsulosin (FLOMAX) 0.4 MG capsule  Patient-reported dosage and instructions? 1 capsule twice daily  How many days do you have left? 17  Preferred Pharmacy? [facility] phone number (if available)? [phone]  Additional Information for Provider? Patient needs the Flomax for 90 days  ---------------------------------------------------------------------------  --------------,  Name of Medication? pioglitazone (ACTOS) 30 MG tablet  Patient-reported dosage and instructions? 1 tablet daily  How many days do you have left? 14  Preferred Pharmacy? [facility] phone number (if available)? [phone]  ---------------------------------------------------------------------------  --------------,  Name of Medication? montelukast (SINGULAIR) 10 MG tablet  Patient-reported dosage and instructions? 1 tablet daily   How many days do you have left? 7  Preferred Pharmacy? [facility] phone number (if available)? [phone]  ---------------------------------------------------------------------------  --------------,  Name of Medication? metoprolol tartrate (LOPRESSOR) 25 MG tablet  Patient-reported dosage and instructions? 1 tablet twice daily  How many days do you have left? 22  Preferred Pharmacy? [facility] phone number (if available)? [phone]  ---------------------------------------------------------------------------  --------------,  Name of Medication? Other - sertraline (ZOLOFT) 50 MG tablet  Patient-reported dosage and instructions? 1 tablet daily   How many days do you have left? 14  Preferred Pharmacy? [facility] phone number (if available)?  [phone]  ---------------------------------------------------------------------------  --------------,  Name of Medication? rivaroxaban (XARELTO) 20 MG TABS

## 2023-05-12 RX ORDER — SERTRALINE HYDROCHLORIDE 100 MG/1
100 TABLET, FILM COATED ORAL DAILY
Qty: 90 TABLET | Refills: 1 | Status: SHIPPED | OUTPATIENT
Start: 2023-05-12

## 2023-05-12 RX ORDER — TAMSULOSIN HYDROCHLORIDE 0.4 MG/1
0.4 CAPSULE ORAL 2 TIMES DAILY
Qty: 90 CAPSULE | Refills: 1 | Status: SHIPPED | OUTPATIENT
Start: 2023-05-12

## 2023-05-12 RX ORDER — PIOGLITAZONEHYDROCHLORIDE 30 MG/1
30 TABLET ORAL DAILY
Qty: 90 TABLET | Refills: 1 | Status: SHIPPED | OUTPATIENT
Start: 2023-05-12

## 2023-05-12 RX ORDER — MONTELUKAST SODIUM 10 MG/1
10 TABLET ORAL DAILY
Qty: 90 TABLET | Refills: 1 | Status: SHIPPED | OUTPATIENT
Start: 2023-05-12

## 2023-05-15 ENCOUNTER — OFFICE VISIT (OUTPATIENT)
Age: 73
End: 2023-05-15
Payer: COMMERCIAL

## 2023-05-15 ENCOUNTER — TELEPHONE (OUTPATIENT)
Age: 73
End: 2023-05-15

## 2023-05-15 VITALS
HEART RATE: 49 BPM | SYSTOLIC BLOOD PRESSURE: 118 MMHG | OXYGEN SATURATION: 95 % | BODY MASS INDEX: 36.68 KG/M2 | WEIGHT: 252 LBS | DIASTOLIC BLOOD PRESSURE: 65 MMHG | TEMPERATURE: 97.1 F

## 2023-05-15 DIAGNOSIS — M62.830 SPASM OF MUSCLE OF LOWER BACK: Primary | ICD-10-CM

## 2023-05-15 DIAGNOSIS — M25.512 LEFT SHOULDER PAIN, UNSPECIFIED CHRONICITY: ICD-10-CM

## 2023-05-15 DIAGNOSIS — M25.812 IMPINGEMENT OF LEFT SHOULDER: ICD-10-CM

## 2023-05-15 PROCEDURE — 1123F ACP DISCUSS/DSCN MKR DOCD: CPT | Performed by: FAMILY MEDICINE

## 2023-05-15 PROCEDURE — 3078F DIAST BP <80 MM HG: CPT | Performed by: FAMILY MEDICINE

## 2023-05-15 PROCEDURE — 99214 OFFICE O/P EST MOD 30 MIN: CPT | Performed by: FAMILY MEDICINE

## 2023-05-15 PROCEDURE — 3074F SYST BP LT 130 MM HG: CPT | Performed by: FAMILY MEDICINE

## 2023-05-15 PROCEDURE — 20552 NJX 1/MLT TRIGGER POINT 1/2: CPT | Performed by: FAMILY MEDICINE

## 2023-05-15 RX ORDER — BLOOD-GLUCOSE METER
EACH MISCELLANEOUS
COMMUNITY
Start: 2023-05-08

## 2023-05-15 RX ORDER — IRON POLYSACCHARIDE COMPLEX 150 MG
130 CAPSULE ORAL
COMMUNITY

## 2023-05-15 RX ORDER — AZELASTINE HYDROCHLORIDE 137 UG/1
SPRAY, METERED NASAL
COMMUNITY
Start: 2023-05-11

## 2023-05-15 RX ORDER — LANCETS 30 GAUGE
EACH MISCELLANEOUS
COMMUNITY
Start: 2023-03-17

## 2023-05-15 SDOH — ECONOMIC STABILITY: HOUSING INSECURITY
IN THE LAST 12 MONTHS, WAS THERE A TIME WHEN YOU DID NOT HAVE A STEADY PLACE TO SLEEP OR SLEPT IN A SHELTER (INCLUDING NOW)?: NO

## 2023-05-15 SDOH — ECONOMIC STABILITY: FOOD INSECURITY: WITHIN THE PAST 12 MONTHS, THE FOOD YOU BOUGHT JUST DIDN'T LAST AND YOU DIDN'T HAVE MONEY TO GET MORE.: NEVER TRUE

## 2023-05-15 SDOH — ECONOMIC STABILITY: INCOME INSECURITY: HOW HARD IS IT FOR YOU TO PAY FOR THE VERY BASICS LIKE FOOD, HOUSING, MEDICAL CARE, AND HEATING?: NOT HARD AT ALL

## 2023-05-15 SDOH — ECONOMIC STABILITY: FOOD INSECURITY: WITHIN THE PAST 12 MONTHS, YOU WORRIED THAT YOUR FOOD WOULD RUN OUT BEFORE YOU GOT MONEY TO BUY MORE.: NEVER TRUE

## 2023-05-15 ASSESSMENT — PATIENT HEALTH QUESTIONNAIRE - PHQ9
1. LITTLE INTEREST OR PLEASURE IN DOING THINGS: 0
SUM OF ALL RESPONSES TO PHQ QUESTIONS 1-9: 0
SUM OF ALL RESPONSES TO PHQ QUESTIONS 1-9: 0
SUM OF ALL RESPONSES TO PHQ9 QUESTIONS 1 & 2: 0
SUM OF ALL RESPONSES TO PHQ QUESTIONS 1-9: 0
2. FEELING DOWN, DEPRESSED OR HOPELESS: 0
SUM OF ALL RESPONSES TO PHQ QUESTIONS 1-9: 0

## 2023-05-15 NOTE — PROGRESS NOTES
Patient states he's having shoulder pain on his left side an hes having a short range of motion. He is having foot pain but knows he have arthritis in his joints. He states he has a sore bump on his left ear that stays.   Chief Complaint   Patient presents with    Follow-up     Vitals:    05/15/23 0820   BP: 118/65   Pulse: (!) 49   Temp: 97.1 °F (36.2 °C)   SpO2: 95%
30 pack year smoking     Diabetes mellitus [address] [address]     Hernia of unspecified site of abdominal cavity without mention of obstruction or gangrene     High cholesterol     Hypertension     Iron deficiency anemia     JW visit     Obesity     Sleep apnea     uses CPAP     Family History   Problem Relation Age of Onset    Stroke Mother      Current Outpatient Medications   Medication Sig Dispense Refill    insulin NPH (HUMULIN N;NOVOLIN N) 100 UNIT/ML injection pen Inject 25 Units into the skin daily      Lancets MISC Use with Glucose Monitor four times a day      Azelastine HCl 137 MCG/SPRAY SOLN 2 (TWO) SPRAY EACH NOSTRIL TWICE DAILY      Blood Glucose Monitoring Suppl (FREESTYLE PRECISION DANIEL SYSTEM) w/Device KIT USE TO MONITOR BLOOD GLUCOSE TWICE A DAY      iron polysaccharides (NIFEREX) 150 MG capsule Take 130 mg by mouth      tamsulosin (FLOMAX) 0.4 MG capsule Take 1 capsule by mouth 2 times daily 90 capsule 1    pioglitazone (ACTOS) 30 MG tablet Take 1 tablet by mouth daily 90 tablet 1    montelukast (SINGULAIR) 10 MG tablet Take 1 tablet by mouth daily 90 tablet 1    metoprolol tartrate (LOPRESSOR) 25 MG tablet Take 1 tablet by mouth 2 times daily 180 tablet 1    sertraline (ZOLOFT) 100 MG tablet Take 1 tablet by mouth daily 90 tablet 1    rivaroxaban (XARELTO) 20 MG TABS tablet Take 1 tablet by mouth Daily with supper 90 tablet 1    albuterol sulfate HFA (PROVENTIL;VENTOLIN;PROAIR) 108 (90 Base) MCG/ACT inhaler Inhale into the lungs as needed      ascorbic acid (VITAMIN C) 500 MG tablet Take by mouth every 7 days      cyanocobalamin 1000 MCG tablet Take 1 tablet by mouth every 7 days      dilTIAZem (TIAZAC) 180 MG extended release capsule Take 1 capsule by mouth daily      ergocalciferol (ERGOCALCIFEROL) 1.25 MG (36921 UT) capsule Take 1 capsule by mouth every 7 days      flecainide (TAMBOCOR) 100 MG tablet TAKE 1 TABLET BY MOUTH TWICE DAILY      omeprazole (PRILOSEC) 40 MG delayed release capsule Take 1 capsule

## 2023-05-15 NOTE — TELEPHONE ENCOUNTER
Patient stated that all his medication was sent over to the wrong pharmacy.  He would like everything switched to:    [facility] [address]    Medications:    Rivaroxaban 20mg  Sertraline 100mg  Metoprolol Tartrate 25mg  Montelukast 10mg  Pioglitazone 30mg  Tamsulosin 0.4mg  Omeprazole 40mg

## 2023-05-18 NOTE — TELEPHONE ENCOUNTER
----- Message from Jin Monroe sent at 5/18/2023  2:04 PM EDT -----  Subject: Message to Provider    QUESTIONS  Information for Provider? Patient is highly frustrated. Patient really   likes his pcp but he can never reach the . Patient needs his   prescriptions to go through his mail order service [facility] but it is being   sent to [place] [facility]. Patient wants Dr Jayleen Espino to call him back personally. He does not want to speak to anyone else in the office. This is not the   first time that this has happened. Please call back and advise.  ---------------------------------------------------------------------------  --------------  Kem NOBLE  [phone]; OK to leave message on voicemail  ---------------------------------------------------------------------------  --------------  SCRIPT ANSWERS  Relationship to Patient?  Self

## 2023-05-19 RX ORDER — ROSUVASTATIN CALCIUM 40 MG/1
40 TABLET, COATED ORAL DAILY
Qty: 90 TABLET | Refills: 3 | OUTPATIENT
Start: 2023-05-19

## 2023-05-19 RX ORDER — TAMSULOSIN HYDROCHLORIDE 0.4 MG/1
0.4 CAPSULE ORAL 2 TIMES DAILY
Qty: 180 CAPSULE | Refills: 1 | OUTPATIENT
Start: 2023-05-19

## 2023-05-19 RX ORDER — PIOGLITAZONEHYDROCHLORIDE 30 MG/1
30 TABLET ORAL DAILY
Qty: 90 TABLET | Refills: 1 | OUTPATIENT
Start: 2023-05-19

## 2023-05-19 RX ORDER — SERTRALINE HYDROCHLORIDE 100 MG/1
100 TABLET, FILM COATED ORAL DAILY
Qty: 90 TABLET | Refills: 1 | OUTPATIENT
Start: 2023-05-19

## 2023-05-19 RX ORDER — OMEPRAZOLE 40 MG/1
40 CAPSULE, DELAYED RELEASE ORAL DAILY
Qty: 90 CAPSULE | Refills: 3 | OUTPATIENT
Start: 2023-05-19

## 2023-05-19 RX ORDER — MONTELUKAST SODIUM 10 MG/1
10 TABLET ORAL DAILY
Qty: 90 TABLET | Refills: 1 | OUTPATIENT
Start: 2023-05-19

## 2023-05-19 RX ORDER — FLECAINIDE ACETATE 100 MG/1
100 TABLET ORAL 2 TIMES DAILY
Qty: 180 TABLET | Refills: 0 | OUTPATIENT
Start: 2023-05-19

## 2023-05-19 RX ORDER — DILTIAZEM HYDROCHLORIDE 180 MG/1
180 CAPSULE, EXTENDED RELEASE ORAL DAILY
Qty: 90 CAPSULE | Refills: 3 | OUTPATIENT
Start: 2023-05-19

## 2023-06-29 ENCOUNTER — TELEPHONE (OUTPATIENT)
Age: 73
End: 2023-06-29

## 2023-06-30 RX ORDER — OMEPRAZOLE 40 MG/1
40 CAPSULE, DELAYED RELEASE ORAL DAILY
Qty: 90 CAPSULE | Refills: 3 | OUTPATIENT
Start: 2023-06-30

## 2023-07-01 RX ORDER — OMEPRAZOLE 40 MG/1
40 CAPSULE, DELAYED RELEASE ORAL DAILY
Qty: 90 CAPSULE | Refills: 3 | Status: SHIPPED | OUTPATIENT
Start: 2023-07-01

## 2023-08-17 ENCOUNTER — TELEPHONE (OUTPATIENT)
Age: 73
End: 2023-08-17

## 2023-08-17 NOTE — TELEPHONE ENCOUNTER
Edwin Dutta called on behalf of the pt to follow up on replacement C-pap supplies (face mask, tube, and filter). Pt was transferred to this writer. Pt stated that his Pulmonologist told him that they do no write orders for C-pap or supplies and was advised to speak with his PCP. He is requesting that an order for a face mask, tube, and filter are sent to [facility]. This writer has scheduled him for an appt; please advise if pt need the appt. Pt has requested to be updated via his home phone.     Thank you

## 2023-08-22 ENCOUNTER — TELEPHONE (OUTPATIENT)
Age: 73
End: 2023-08-22

## 2023-08-22 NOTE — TELEPHONE ENCOUNTER
This writer informed rico of his upcoming appointment on Thursday 09/21/2023 at 10:40am with sleep study.

## 2023-09-18 ASSESSMENT — SLEEP AND FATIGUE QUESTIONNAIRES
DO YOU HAVE DIFFICULTY OPERATING A MOTOR VEHICLE FOR SHORT DISTANCES (LESS THAN 100 MILES) BECAUSE YOU BECOME SLEEPY: NO
HOW LIKELY ARE YOU TO NOD OFF OR FALL ASLEEP WHILE SITTING INACTIVE IN A PUBLIC PLACE: WOULD NEVER DOZE
HOW LIKELY ARE YOU TO NOD OFF OR FALL ASLEEP WHEN YOU ARE A PASSENGER IN A CAR FOR AN HOUR WITHOUT A BREAK: WOULD NEVER DOZE
HAS YOUR MOOD BEEN AFFECTED BECAUSE YOU ARE SLEEPY OR TIRED: NO
HOW LIKELY ARE YOU TO NOD OFF OR FALL ASLEEP WHILE SITTING AND READING: WOULD NEVER DOZE
DO YOU HAVE DIFFICULTY BEING AS ACTIVE AS YOU WANT TO BE IN THE MORNING BECAUSE YOU ARE SLEEPY OR TIRED: NO
HOW LIKELY ARE YOU TO NOD OFF OR FALL ASLEEP WHILE SITTING INACTIVE IN A PUBLIC PLACE: 0
NUMBER OF TIMES YOU WAKE PER NIGHT: 2
HOW LIKELY ARE YOU TO NOD OFF OR FALL ASLEEP WHILE SITTING AND TALKING TO SOMEONE: WOULD NEVER DOZE
DO YOU GET TOO LITTLE SLEEP AT NIGHT: NO
HOW LIKELY ARE YOU TO NOD OFF OR FALL ASLEEP WHILE SITTING QUIETLY AFTER LUNCH WITHOUT ALCOHOL: WOULD NEVER DOZE
AVERAGE NUMBER OF SLEEP HOURS: 9
HOW LIKELY ARE YOU TO NOD OFF OR FALL ASLEEP WHEN YOU ARE A PASSENGER IN A CAR FOR AN HOUR WITHOUT A BREAK: 0
DO YOU HAVE PROBLEMS WITH FREQUENT AWAKENINGS AT NIGHT: NO
ARE YOU BOTHERED BY WAKING UP TOO EARLY AND NOT BEING ABLE TO GET BACK TO SLEEP: IS NOT
DO YOU WORK SHIFTS: NO
HAS YOUR RELATIONSHIP WITH FAMILY, FRIENDS OR WORK COLLEAGUES BEEN AFFECTED BECAUSE YOU ARE SLEEPY OR TIRED: NO
ESS TOTAL SCORE: 1
HOW LIKELY ARE YOU TO NOD OFF OR FALL ASLEEP WHILE SITTING AND TALKING TO SOMEONE: 0
HOW LIKELY ARE YOU TO NOD OFF OR FALL ASLEEP IN A CAR, WHILE STOPPED FOR A FEW MINUTES IN TRAFFIC: WOULD NEVER DOZE
HOW LIKELY ARE YOU TO NOD OFF OR FALL ASLEEP WHILE WATCHING TV: 1
SELECT ANY OF THE FOLLOWING BEHAVIORS OBSERVED WHILE YOU ARE ASLEEP: LIGHT SNORING
HOW LIKELY ARE YOU TO NOD OFF OR FALL ASLEEP WHILE SITTING QUIETLY AFTER LUNCH WITHOUT ALCOHOL: 0
FOSQ SCORE: 20
DO YOU HAVE DIFFICULTY VISITING YOUR FAMILY OR FRIENDS IN THEIR HOME BECAUSE YOU BECOME SLEEPY OR TIRED: NO
DO YOU HAVE DIFFICULTY BEING AS ACTIVE AS YOU WANT TO BE IN THE EVENING BECAUSE YOU ARE SLEEPY OR TIRED: NO
DO YOU HAVE DIFFICULTY OPERATING A MOTOR VEHICLE FOR LONG DISTANCES (GREATER THAN 100 MILES) BECAUSE YOU BECOME SLEEPY: NO
AVERAGE NUMBER OF SLEEP HOURS: 9
WHAT TIME DO YOU USUALLY GO TO BED: 22:00
DO YOU HAVE DIFFICULTY WATCHING A MOVIE OR VIDEO BECAUSE YOU BECOME SLEEPY OR TIRED: NO
ARE YOU BOTHERED BY WAKING UP TOO EARLY AND NOT BEING ABLE TO GET BACK TO SLEEP: NO
HOW LIKELY ARE YOU TO NOD OFF OR FALL ASLEEP IN A CAR, WHILE STOPPED FOR A FEW MINUTES IN TRAFFIC: 0
HOW LIKELY ARE YOU TO NOD OFF OR FALL ASLEEP WHILE SITTING AND READING: 0
HOW LIKELY ARE YOU TO NOD OFF OR FALL ASLEEP WHILE LYING DOWN TO REST IN THE AFTERNOON WHEN CIRCUMSTANCES PERMIT: WOULD NEVER DOZE
DO YOU TAKE NAPS: NO
NECK CIRCUMFERENCE (INCHES): 20
HOW LIKELY ARE YOU TO NOD OFF OR FALL ASLEEP WHILE LYING DOWN TO REST IN THE AFTERNOON WHEN CIRCUMSTANCES PERMIT: 0
HOW LIKELY ARE YOU TO NOD OFF OR FALL ASLEEP WHILE WATCHING TV: SLIGHT CHANCE OF DOZING
DO YOU GET TOO LITTLE SLEEP AT NIGHT: DOES NOT
SELECT ANY OF THE FOLLOWING BEHAVIORS OBSERVED WHILE YOU ARE ASLEEP: TWITCHING OF LEGS OR FEET
DO YOU HAVE DIFFICULTY CONCENTRATING ON THE THINGS YOU DO BECAUSE YOU ARE SLEEPY OR TIRED: NO
SELECT ANY OF THE FOLLOWING BEHAVIORS OBSERVED WHILE PATIENT ASLEEP: LIGHT SNORING;TWITCHING OF LEGS OR FEET
DO YOU GENERALLY HAVE DIFFICULTY REMEMBERING THINGS BECAUSE YOU ARE SLEEPY OR TIRED: NO

## 2023-09-21 ENCOUNTER — OFFICE VISIT (OUTPATIENT)
Age: 73
End: 2023-09-21

## 2023-09-21 VITALS
BODY MASS INDEX: 36.65 KG/M2 | DIASTOLIC BLOOD PRESSURE: 64 MMHG | HEART RATE: 50 BPM | OXYGEN SATURATION: 98 % | SYSTOLIC BLOOD PRESSURE: 133 MMHG | HEIGHT: 70 IN | WEIGHT: 256 LBS

## 2023-09-21 DIAGNOSIS — I48.0 PAROXYSMAL ATRIAL FIBRILLATION (HCC): ICD-10-CM

## 2023-09-21 DIAGNOSIS — G47.33 OSA (OBSTRUCTIVE SLEEP APNEA): Primary | ICD-10-CM

## 2023-09-21 NOTE — PROGRESS NOTES
[address] [address]  Dept: [phone] [address] [place], [address]  Tel.  [phone]  Fax. [address]  [address] [facility], [address] [place] [address]  Tel.  [phone]  Fax. [phone] [address]  Tel.  [phone]  Fax. [phone]       Chief Complaint       Chief Complaint   Patient presents with    Sleep Problem     NP self refd currently on PAP; dx MAGDALENA       HPI      Sofya Silva is 68 y.o. male seen for evaluation of a sleep disorder. The patient reports he had initial sleep evaluation number of years ago at which time he was started on CPAP currently at 13 cm. Device set up date: 9/21/2023     Compliance demonstrated that during 30 days, CPAP use during 29 days with CMS compliance 97%. Average use 9.6 hours. AHI overall 7.6/h. Frequently between 10-20/h. Currently retires at 10 PM and awakening 7-8 AM.  Has been told of loud snoring. Does not fall asleep inappropriately during the day. Denies sleep talking or sleepwalking, bruxism or nocturnal incontinence, abnormal arm or leg movements, hypnagogue hallucinations, sleep paralysis or cataplexy.               9/18/2023     9:26 AM   Sleep Medicine   Sitting and reading 0   Watching TV 1   Sitting, inactive in a public place (e.g. a theatre or a meeting) 0   As a passenger in a car for an hour without a break 0   Lying down to rest in the afternoon when circumstances permit 0   Sitting and talking to someone 0   Sitting quietly after a lunch without alcohol 0   In a car, while stopped for a few minutes in traffic 0   [place] Sleepiness Score 1   Neck circumference (Inches) 20        No Known Allergies      Current Outpatient Medications:     omeprazole (PRILOSEC) 40 MG delayed release capsule, Take 1 capsule by mouth daily, Disp: 90 capsule, Rfl: 3

## 2023-09-29 ENCOUNTER — TELEPHONE (OUTPATIENT)
Age: 73
End: 2023-09-29

## 2023-09-29 RX ORDER — FLECAINIDE ACETATE 100 MG/1
100 TABLET ORAL 2 TIMES DAILY
Qty: 180 TABLET | Refills: 0 | OUTPATIENT
Start: 2023-09-29

## 2023-09-29 RX ORDER — FLECAINIDE ACETATE 100 MG/1
100 TABLET ORAL 2 TIMES DAILY
Qty: 180 TABLET | Refills: 0 | Status: SHIPPED | OUTPATIENT
Start: 2023-09-29

## 2023-09-29 NOTE — TELEPHONE ENCOUNTER
Refill per VO of Dr. Rick Flannery  Last appt: 6/30/2022    Future Appointments   Date Time Provider [address]   10/13/2023 10:40 AM [facility]   10/15/2023  9:30 PM BEDROOM 3 [address] [facility]       Requested Prescriptions     Signed Prescriptions Disp Refills    flecainide (TAMBOCOR) 100 MG tablet 180 tablet 0     Sig: Take 1 tablet by mouth 2 times daily     Authorizing Provider: Brooks Reagan     Ordering User: Edilson Dural

## 2023-10-13 ENCOUNTER — OFFICE VISIT (OUTPATIENT)
Age: 73
End: 2023-10-13
Payer: MEDICARE

## 2023-10-13 VITALS
HEART RATE: 55 BPM | BODY MASS INDEX: 35.5 KG/M2 | WEIGHT: 248 LBS | SYSTOLIC BLOOD PRESSURE: 138 MMHG | OXYGEN SATURATION: 93 % | HEIGHT: 70 IN | DIASTOLIC BLOOD PRESSURE: 68 MMHG

## 2023-10-13 DIAGNOSIS — R01.1 HEART MURMUR: ICD-10-CM

## 2023-10-13 DIAGNOSIS — E11.9 TYPE 2 DIABETES MELLITUS WITHOUT COMPLICATION, WITHOUT LONG-TERM CURRENT USE OF INSULIN (HCC): ICD-10-CM

## 2023-10-13 DIAGNOSIS — E78.5 HYPERLIPIDEMIA, UNSPECIFIED HYPERLIPIDEMIA TYPE: ICD-10-CM

## 2023-10-13 DIAGNOSIS — I48.0 PAROXYSMAL ATRIAL FIBRILLATION (HCC): Primary | ICD-10-CM

## 2023-10-13 DIAGNOSIS — R53.83 OTHER FATIGUE: ICD-10-CM

## 2023-10-13 DIAGNOSIS — E66.01 SEVERE OBESITY (BMI 35.0-39.9) WITH COMORBIDITY (HCC): ICD-10-CM

## 2023-10-13 PROCEDURE — 1123F ACP DISCUSS/DSCN MKR DOCD: CPT | Performed by: SPECIALIST

## 2023-10-13 PROCEDURE — 3078F DIAST BP <80 MM HG: CPT | Performed by: SPECIALIST

## 2023-10-13 PROCEDURE — 93010 ELECTROCARDIOGRAM REPORT: CPT | Performed by: SPECIALIST

## 2023-10-13 PROCEDURE — 3044F HG A1C LEVEL LT 7.0%: CPT | Performed by: SPECIALIST

## 2023-10-13 PROCEDURE — 93005 ELECTROCARDIOGRAM TRACING: CPT | Performed by: SPECIALIST

## 2023-10-13 PROCEDURE — 99214 OFFICE O/P EST MOD 30 MIN: CPT | Performed by: SPECIALIST

## 2023-10-13 PROCEDURE — 3075F SYST BP GE 130 - 139MM HG: CPT | Performed by: SPECIALIST

## 2023-10-13 RX ORDER — LOSARTAN POTASSIUM 25 MG/1
25 TABLET ORAL DAILY
Qty: 90 TABLET | Refills: 3 | Status: SHIPPED | OUTPATIENT
Start: 2023-10-13

## 2023-10-13 RX ORDER — FLUTICASONE FUROATE, UMECLIDINIUM BROMIDE AND VILANTEROL TRIFENATATE 100; 62.5; 25 UG/1; UG/1; UG/1
1 POWDER RESPIRATORY (INHALATION) DAILY
COMMUNITY

## 2023-10-13 RX ORDER — FERROUS SULFATE 325(65) MG
325 TABLET ORAL
COMMUNITY

## 2023-10-15 ENCOUNTER — HOSPITAL ENCOUNTER (OUTPATIENT)
Facility: HOSPITAL | Age: 73
Discharge: HOME | End: 2023-10-18
Payer: MEDICARE

## 2023-10-15 VITALS
TEMPERATURE: 97.1 F | HEART RATE: 61 BPM | OXYGEN SATURATION: 97 % | SYSTOLIC BLOOD PRESSURE: 132 MMHG | DIASTOLIC BLOOD PRESSURE: 75 MMHG

## 2023-10-15 DIAGNOSIS — G47.33 OSA (OBSTRUCTIVE SLEEP APNEA): ICD-10-CM

## 2023-10-15 DIAGNOSIS — I48.0 PAROXYSMAL ATRIAL FIBRILLATION (HCC): ICD-10-CM

## 2023-10-15 PROCEDURE — 95811 POLYSOM 6/>YRS CPAP 4/> PARM: CPT | Performed by: SPECIALIST

## 2023-10-16 NOTE — PROGRESS NOTES
[address] [facility].[place], [address]  Tel.  [phone]  Fax. [address] [place], [address] [facility]  Tel.  [phone]  Fax. [phone] [address]  Tel.  [phone]  Fax. [phone]     Sleep Study Technical Notes  Disclaimer:  all notes have not been confirmed by interpreting physician      Acquisition Notes:  Lights off: 10:36pm  Sleep onset: 10:40pm  PAP titration: BIPAP 11/7cm H2O - /1 cm H2O  Mask(s) Used: Resmed AirFit Large full-face mask  Other comments: The study ordered was a Split-Night Polysomnogram with an order to split to BIPAP. The hookup was completed without issue. The patient was placed in bed and the lights were off at 10:36pm. Sleep onset occurred at approximately 10:40pm.    During the study, stages N1, N2, and REM were noted. The patient slept on all sides. Moderate snoring was heard. The patient appeared to show signs of sleep-disordered breathing. These events appeared to warrant PAP therapy. Therefore, CPAP was started around 12:48pm. The Pt was placed on a ResMed F20 AirFit Large full-face mask at a pressure of 11/7cm H2O as the Pt felt more comfortable on this pressure. During the titration portion of the study, the Pt appeared to continue to show signs of sleep-disordered breathing. Therefore, PAP was slowly titrated to a final resting pressure of 17/13cm H2O. Lights were on at 4:41am.    POST Test:  Patient was awakened. Electrodes were removed. The patient was discharged after completing the Post Questionnaire. Patient stated that they were alert and ok to drive. Equipment and room cleaned per infection control policy.

## 2023-10-29 ENCOUNTER — TELEPHONE (OUTPATIENT)
Age: 73
End: 2023-10-29

## 2023-10-29 DIAGNOSIS — G47.33 OSA (OBSTRUCTIVE SLEEP APNEA): Primary | ICD-10-CM

## 2023-10-30 RX ORDER — FLECAINIDE ACETATE 100 MG/1
100 TABLET ORAL 2 TIMES DAILY
Qty: 180 TABLET | Refills: 0 | OUTPATIENT
Start: 2023-10-30

## 2023-10-30 NOTE — TELEPHONE ENCOUNTER
Refill per VO of Dr. Robb Elkins  Last appt: 10/13/2023    Future Appointments   Date Time Provider [address]   1/10/2024 11:00 AM [facility] 1 [facility] AMB   10/14/2024 11:00 AM An Jensen MD [facility]  AMB       Requested Prescriptions     Pending Prescriptions Disp Refills    flecainide (TAMBOCOR) 100 MG tablet [Pharmacy Med Name: FLECAINIDE TAB 100MG] 180 tablet 0     Sig: TAKE 1 TABLET TWICE A DAY

## 2023-10-31 ENCOUNTER — NURSE TRIAGE (OUTPATIENT)
Dept: OTHER | Facility: CLINIC | Age: 73
End: 2023-10-31

## 2023-10-31 ENCOUNTER — TELEPHONE (OUTPATIENT)
Age: 73
End: 2023-10-31

## 2023-10-31 RX ORDER — SERTRALINE HYDROCHLORIDE 100 MG/1
100 TABLET, FILM COATED ORAL DAILY
Qty: 90 TABLET | Refills: 1 | Status: SHIPPED | OUTPATIENT
Start: 2023-10-31

## 2023-10-31 RX ORDER — MONTELUKAST SODIUM 10 MG/1
10 TABLET ORAL DAILY
Qty: 90 TABLET | Refills: 1 | Status: SHIPPED | OUTPATIENT
Start: 2023-10-31

## 2023-10-31 RX ORDER — TAMSULOSIN HYDROCHLORIDE 0.4 MG/1
0.4 CAPSULE ORAL 2 TIMES DAILY
Qty: 180 CAPSULE | Refills: 1 | Status: SHIPPED | OUTPATIENT
Start: 2023-10-31

## 2023-10-31 RX ORDER — PIOGLITAZONEHYDROCHLORIDE 30 MG/1
30 TABLET ORAL DAILY
Qty: 90 TABLET | Refills: 1 | Status: SHIPPED | OUTPATIENT
Start: 2023-10-31

## 2023-10-31 NOTE — TELEPHONE ENCOUNTER
----- Message from Malissa Chau sent at 10/31/2023  3:41 PM EDT -----  Subject: Message to Provider    QUESTIONS  Information for Provider? Would like Dr. Smitah Bowen opinion on whether he   needs a pneumonia vaccine?   ---------------------------------------------------------------------------  --------------  Kam NOBLE  [phone]; OK to leave message on voicemail  ---------------------------------------------------------------------------  --------------  SCRIPT ANSWERS  Relationship to Patient?  Self

## 2023-10-31 NOTE — TELEPHONE ENCOUNTER
[place]    Received call from [place] at [facility] with [employer]. Subjective: Caller states \"I saw Dr. Smitha Bowen on last visit and tried to set up a 6 month follow up visit. My shoulder was bothering me then. Doctor gave me exercises to do. Since then if I stand for 30 minutes, I get pain in my leg. \"     Current Symptoms:   Left shoulder pain  Right hip pain     Onset: weeks ago     Pain Severity: 7/10; Temperature: no     What has been tried: tylenol     Recommended disposition: See PCP within 3 Days    Care advice provided, patient verbalizes understanding; denies any other questions or concerns; instructed to call back for any new or worsening symptoms. Patient/Caller agrees with recommended disposition; writer provided warm transfer to [email or web address] at [facility] for appointment scheduling    Attention Provider: Thank you for allowing me to participate in the care of your patient. The patient was connected to triage in response to information provided to the ECC/PSC. Please do not respond through this encounter as the response is not directed to a shared pool.       Reason for Disposition   [1] MODERATE pain (e.g., interferes with normal activities, limping) AND [2] present > 3 days    Protocols used: Hip Pain-ADULT-

## 2023-11-01 ENCOUNTER — OFFICE VISIT (OUTPATIENT)
Age: 73
End: 2023-11-01
Payer: MEDICARE

## 2023-11-01 VITALS
DIASTOLIC BLOOD PRESSURE: 77 MMHG | SYSTOLIC BLOOD PRESSURE: 123 MMHG | HEART RATE: 55 BPM | WEIGHT: 252 LBS | RESPIRATION RATE: 16 BRPM | OXYGEN SATURATION: 96 % | BODY MASS INDEX: 36.16 KG/M2

## 2023-11-01 DIAGNOSIS — M25.512 CHRONIC LEFT SHOULDER PAIN: Primary | ICD-10-CM

## 2023-11-01 DIAGNOSIS — G89.29 CHRONIC LEFT SHOULDER PAIN: Primary | ICD-10-CM

## 2023-11-01 DIAGNOSIS — M79.605 LEFT LEG PAIN: ICD-10-CM

## 2023-11-01 PROCEDURE — 99213 OFFICE O/P EST LOW 20 MIN: CPT

## 2023-11-01 PROCEDURE — 3078F DIAST BP <80 MM HG: CPT

## 2023-11-01 PROCEDURE — 3074F SYST BP LT 130 MM HG: CPT

## 2023-11-01 PROCEDURE — 1123F ACP DISCUSS/DSCN MKR DOCD: CPT

## 2023-11-01 ASSESSMENT — PATIENT HEALTH QUESTIONNAIRE - PHQ9
1. LITTLE INTEREST OR PLEASURE IN DOING THINGS: 0
SUM OF ALL RESPONSES TO PHQ QUESTIONS 1-9: 0
2. FEELING DOWN, DEPRESSED OR HOPELESS: 0
SUM OF ALL RESPONSES TO PHQ9 QUESTIONS 1 & 2: 0
SUM OF ALL RESPONSES TO PHQ QUESTIONS 1-9: 0

## 2023-11-01 NOTE — PROGRESS NOTES
[facility] [facility], [address] [place] [address]   Office [phone], Fax [phone]      Chief Complaint:     Denny Lamb is a 68 y.o. male that presents for:   Chief Complaint   Patient presents with    Arthritis       Subjective:   HPI:    Has chronic pain in many areas d/t arthritis. Has been blowing leaves for hours a day lately which has worsened the pain. Has been using biofreeze and heating pad which work well. Sometimes uses ice pack and tylenol as well. Not doing any exercises/stretches at home for the leg pain. #R Leg Pain: For about a month, he has noticed R upper leg pain from knee to hip, feels like burning/stinging/aching. Only happening after standing or exertion for a long period of time. No morning pain. Normal strength and sensation. #L Shoulder Pain: Not new, comes and goes with activity. Better after resting for a while. Sometimes notices pain at night when he rolls onto it. Normal strength and sensation. Interested in injection for this. Past medical history, social history, medications, and allergies personally reviewed.   Past Medical History:   Diagnosis Date    Atrial fibrillation ([address])     Afib 18 - spont converted to NSR    BPH (benign prostatic hyperplasia)     COPD (chronic obstructive pulmonary disease) (HCC)     mild COPD - 30 pack year smoking     Diabetes mellitus (HCC)     Hearing loss     Hernia of unspecified site of abdominal cavity without mention of obstruction or gangrene     High cholesterol     Hypertension     Iron deficiency anemia     JW visit     Obesity     Sleep apnea     uses CPAP        Social Hx:   Social History     Socioeconomic History    Marital status:    Tobacco Use    Smoking status: Former     Packs/day: 1     Types: Cigarettes     Quit date: 6/3/1990     Years since quittin.4    Smokeless tobacco: Never   Substance and Sexual Activity    Alcohol use: Yes    Drug use: No     Social Determinants of Health     Financial

## 2023-11-01 NOTE — PATIENT INSTRUCTIONS
- Home exercises in handout  - Referral to physical therapy if home exercises not helpful  - Referral to sports medicine for possible left shoulder injection  - Can alternate ice and heat 15 min at a time as needed for relief  - Can use tylenol 500-1000mg every 6-8 hours as needed for pain relief  - Continue biofreeze as needed

## 2023-11-03 ENCOUNTER — TELEPHONE (OUTPATIENT)
Age: 73
End: 2023-11-03

## 2023-11-03 ENCOUNTER — CLINICAL DOCUMENTATION (OUTPATIENT)
Age: 73
End: 2023-11-03

## 2023-11-03 LAB
ALBUMIN SERPL-MCNC: 4.4 G/DL (ref 3.8–4.8)
ALBUMIN/GLOB SERPL: 2.1 {RATIO} (ref 1.2–2.2)
ALP SERPL-CCNC: 70 IU/L (ref 44–121)
ALT SERPL-CCNC: 17 IU/L (ref 0–44)
AST SERPL-CCNC: 25 IU/L (ref 0–40)
BILIRUB SERPL-MCNC: 0.3 MG/DL (ref 0–1.2)
BUN SERPL-MCNC: 22 MG/DL (ref 8–27)
BUN/CREAT SERPL: 22 (ref 10–24)
CALCIUM SERPL-MCNC: 9 MG/DL (ref 8.6–10.2)
CHLORIDE SERPL-SCNC: 106 MMOL/L (ref 96–106)
CHOLEST SERPL-MCNC: 135 MG/DL (ref 100–199)
CO2 SERPL-SCNC: 22 MMOL/L (ref 20–29)
CREAT SERPL-MCNC: 1 MG/DL (ref 0.76–1.27)
EGFRCR SERPLBLD CKD-EPI 2021: 79 ML/MIN/1.73
ERYTHROCYTE [DISTWIDTH] IN BLOOD BY AUTOMATED COUNT: 13.3 % (ref 11.6–15.4)
GLOBULIN SER CALC-MCNC: 2.1 G/DL (ref 1.5–4.5)
GLUCOSE SERPL-MCNC: 100 MG/DL (ref 70–99)
HBA1C MFR BLD: 5.8 % (ref 4.8–5.6)
HCT VFR BLD AUTO: 33.6 % (ref 37.5–51)
HDLC SERPL-MCNC: 55 MG/DL
HGB BLD-MCNC: 11.1 G/DL (ref 13–17.7)
LDLC SERPL CALC-MCNC: 65 MG/DL (ref 0–99)
LDLC/HDLC SERPL: 1.2 RATIO (ref 0–3.6)
MCH RBC QN AUTO: 31.4 PG (ref 26.6–33)
MCHC RBC AUTO-ENTMCNC: 33 G/DL (ref 31.5–35.7)
MCV RBC AUTO: 95 FL (ref 79–97)
NONHDLC SERPL-MCNC: 80 MG/DL (ref 0–129)
PLATELET # BLD AUTO: 189 X10E3/UL (ref 150–450)
POTASSIUM SERPL-SCNC: 4.7 MMOL/L (ref 3.5–5.2)
PROT SERPL-MCNC: 6.5 G/DL (ref 6–8.5)
RBC # BLD AUTO: 3.54 X10E6/UL (ref 4.14–5.8)
SODIUM SERPL-SCNC: 142 MMOL/L (ref 134–144)
TRIGL SERPL-MCNC: 79 MG/DL (ref 0–149)
TSH SERPL DL<=0.005 MIU/L-ACNC: 0.76 UIU/ML (ref 0.45–4.5)
WBC # BLD AUTO: 4.4 X10E3/UL (ref 3.4–10.8)

## 2023-11-05 LAB
CHOLEST SERPL-MCNC: 147 MG/DL (ref 100–199)
HDL SERPL-SCNC: 39.4 UMOL/L
HDLC SERPL-MCNC: 56 MG/DL
IMP & REVIEW OF LAB RESULTS: NORMAL
LDL SERPL QN: 21.3 NM
LDL SERPL-SCNC: 792 NMOL/L
LDL SMALL SERPL-SCNC: 406 NMOL/L
LDLC SERPL CALC-MCNC: 76 MG/DL (ref 0–99)
LP-IR SCORE SERPL: 52
TRIGL SERPL-MCNC: 80 MG/DL (ref 0–149)

## 2023-11-06 ENCOUNTER — CLINICAL DOCUMENTATION (OUTPATIENT)
Age: 73
End: 2023-11-06

## 2023-11-07 ENCOUNTER — OFFICE VISIT (OUTPATIENT)
Age: 73
End: 2023-11-07
Payer: MEDICARE

## 2023-11-07 VITALS
BODY MASS INDEX: 36.16 KG/M2 | OXYGEN SATURATION: 98 % | HEART RATE: 54 BPM | DIASTOLIC BLOOD PRESSURE: 65 MMHG | WEIGHT: 252 LBS | SYSTOLIC BLOOD PRESSURE: 111 MMHG

## 2023-11-07 DIAGNOSIS — M25.812 IMPINGEMENT OF LEFT SHOULDER: ICD-10-CM

## 2023-11-07 DIAGNOSIS — G89.29 CHRONIC LEFT SHOULDER PAIN: Primary | ICD-10-CM

## 2023-11-07 DIAGNOSIS — M25.512 CHRONIC LEFT SHOULDER PAIN: Primary | ICD-10-CM

## 2023-11-07 PROCEDURE — 20611 DRAIN/INJ JOINT/BURSA W/US: CPT | Performed by: FAMILY MEDICINE

## 2023-11-07 PROCEDURE — 99214 OFFICE O/P EST MOD 30 MIN: CPT | Performed by: FAMILY MEDICINE

## 2023-11-07 PROCEDURE — 3074F SYST BP LT 130 MM HG: CPT | Performed by: FAMILY MEDICINE

## 2023-11-07 PROCEDURE — 1123F ACP DISCUSS/DSCN MKR DOCD: CPT | Performed by: FAMILY MEDICINE

## 2023-11-07 PROCEDURE — 3078F DIAST BP <80 MM HG: CPT | Performed by: FAMILY MEDICINE

## 2023-11-07 PROCEDURE — PBSHW PBB SHADOW CHARGE: Performed by: FAMILY MEDICINE

## 2023-11-07 RX ORDER — BETAMETHASONE SODIUM PHOSPHATE AND BETAMETHASONE ACETATE 3; 3 MG/ML; MG/ML
12 INJECTION, SUSPENSION INTRA-ARTICULAR; INTRALESIONAL; INTRAMUSCULAR; SOFT TISSUE ONCE
Status: COMPLETED | OUTPATIENT
Start: 2023-11-07 | End: 2023-11-07

## 2023-11-07 RX ORDER — LIDOCAINE HYDROCHLORIDE 10 MG/ML
3 INJECTION, SOLUTION INFILTRATION; PERINEURAL ONCE
Status: COMPLETED | OUTPATIENT
Start: 2023-11-07 | End: 2023-11-07

## 2023-11-07 RX ADMIN — LIDOCAINE HYDROCHLORIDE 3 ML: 10 INJECTION, SOLUTION INFILTRATION; PERINEURAL at 15:09

## 2023-11-07 RX ADMIN — BETAMETHASONE SODIUM PHOSPHATE AND BETAMETHASONE ACETATE 12 MG: 3; 3 INJECTION, SUSPENSION INTRA-ARTICULAR; INTRALESIONAL; INTRAMUSCULAR at 15:10

## 2023-11-07 ASSESSMENT — PATIENT HEALTH QUESTIONNAIRE - PHQ9
SUM OF ALL RESPONSES TO PHQ QUESTIONS 1-9: 0
SUM OF ALL RESPONSES TO PHQ9 QUESTIONS 1 & 2: 0
SUM OF ALL RESPONSES TO PHQ QUESTIONS 1-9: 0
2. FEELING DOWN, DEPRESSED OR HOPELESS: 0
SUM OF ALL RESPONSES TO PHQ QUESTIONS 1-9: 0
1. LITTLE INTEREST OR PLEASURE IN DOING THINGS: 0
SUM OF ALL RESPONSES TO PHQ QUESTIONS 1-9: 0

## 2023-11-07 NOTE — PROGRESS NOTES
Chief Complaint   Patient presents with    Shoulder Pain     left     Vitals:    11/07/23 1136   BP: (!) 106/49   Pulse: (!) 48   SpO2: 95%      Vitals:    11/07/23 1139   BP: 111/65   Pulse: 54   SpO2: 98%

## 2023-11-07 NOTE — PROGRESS NOTES
[address] Sports Medicine      Chief Complaint:   Chief Complaint   Patient presents with    Shoulder Pain     left       History of Present Illness     Patient Identification  Brady Reagan is a 68 y.o. male complains of pain in the left shoulder pain.     - Pain has been intermittent for ~1 year  - Feels on lateral aspect of left shoulder  - Has pain with heavy lifting  - Pain does not radiate   - Has not tried PT for this  - Pt plays golf, feels this will aggravate his pain   - Tried Biofreeze with temporary relief, Tylenol with temporary relief   - Pt is retired, worked in [employer] industry     Past Medical History:   Diagnosis Date    Atrial fibrillation ([address])     Afib 5/1/18 - spont converted to NSR    BPH (benign prostatic hyperplasia)     COPD (chronic obstructive pulmonary disease) ([facility])     mild COPD - 30 pack year smoking     Diabetes mellitus (HCC)     Hearing loss     Hernia of unspecified site of abdominal cavity without mention of obstruction or gangrene     High cholesterol     Hypertension     Iron deficiency anemia     JW visit     Obesity     Sleep apnea     uses CPAP     Family History   Problem Relation Age of Onset    Stroke Mother      Current Outpatient Medications   Medication Sig Dispense Refill    pioglitazone (ACTOS) 30 MG tablet TAKE 1 TABLET DAILY 90 tablet 1    sertraline (ZOLOFT) 100 MG tablet TAKE 1 TABLET DAILY 90 tablet 1    montelukast (SINGULAIR) 10 MG tablet TAKE 1 TABLET DAILY 90 tablet 1    tamsulosin (FLOMAX) 0.4 MG capsule TAKE 1 CAPSULE TWICE DAILY 180 capsule 1    metoprolol tartrate (LOPRESSOR) 25 MG tablet TAKE 1 TABLET TWICE A  tablet 1    fluticasone-umeclidin-vilant (TRELEGY ELLIPTA) 100-62.5-25 MCG/ACT AEPB inhaler Inhale 1 puff into the lungs daily      ferrous sulfate (IRON 325) 325 (65 Fe) MG tablet Take 1 tablet by mouth every 72 hours as needed (Every 3 days)      losartan

## 2023-11-10 ENCOUNTER — TELEPHONE (OUTPATIENT)
Age: 73
End: 2023-11-10

## 2023-11-10 NOTE — TELEPHONE ENCOUNTER
Hi Dr. Reynaldo Garza,    I received a message from [address] [place] [address] they stated that patient needed a refill on his medication:    rivaroxaban (XARELTO) 20 MG TABS tablet       I did inform pharmacist that the last person that prescribed medication was Katey Santos MD so I was not sure if they needed to reach out to him.     Meg contact information:    P: [phone]  Option 1    F: [phone]

## 2023-11-13 ENCOUNTER — TELEPHONE (OUTPATIENT)
Age: 73
End: 2023-11-13

## 2023-11-13 ENCOUNTER — CLINICAL DOCUMENTATION (OUTPATIENT)
Age: 73
End: 2023-11-13

## 2023-11-13 DIAGNOSIS — R53.83 OTHER FATIGUE: ICD-10-CM

## 2023-11-13 DIAGNOSIS — E11.9 TYPE 2 DIABETES MELLITUS WITHOUT COMPLICATION, WITHOUT LONG-TERM CURRENT USE OF INSULIN (HCC): ICD-10-CM

## 2023-11-13 DIAGNOSIS — E78.5 HYPERLIPIDEMIA, UNSPECIFIED HYPERLIPIDEMIA TYPE: ICD-10-CM

## 2023-11-13 DIAGNOSIS — I48.0 PAROXYSMAL ATRIAL FIBRILLATION (HCC): ICD-10-CM

## 2023-11-13 NOTE — TELEPHONE ENCOUNTER
Refill per VO of Dr. Desirae Valentine  Last appt: 10/13/2023    Future Appointments   Date Time Provider [address]   1/10/2024 11:00 AM [facility] ECHO 1 [facility] BS AMB   10/14/2024 11:00 AM Jackelyn Rubalcava MD [facility] AMB       Requested Prescriptions     Signed Prescriptions Disp Refills    rivaroxaban (XARELTO) 20 MG TABS tablet 90 tablet 3     Sig: Take 1 tablet by mouth Daily with supper     Authorizing Provider: Jackelyn Rubalcava     Ordering User: Kan Osman

## 2023-11-16 ENCOUNTER — TELEPHONE (OUTPATIENT)
Age: 73
End: 2023-11-16

## 2023-11-16 NOTE — TELEPHONE ENCOUNTER
Called and spoke with patient who confirmed name and . Patient stated that shoulder pain is about the same as before, but not worse. Just a little better. He has been doing home exercises that were provided at visit, but stated that the exercise aggravates it more then it helps and when he raises his arm it hurts. Patient stated that he has been using Tylenol 500 MG 2 tablets as needed for pain and that it helps some. However, patient is currently on blood thinning medication and stated that he can not take Aleve. Patient stated that heat helps more than anything and that it is not throbbing. Message sent to Provider for additional advice.

## 2023-11-16 NOTE — TELEPHONE ENCOUNTER
----- Message from Ganesh Sample sent at 11/13/2023 12:17 PM EST -----  Subject: Message to Provider    QUESTIONS  Information for Provider? Pt states that he wanted to let his PCP know   that he was advised to get a shot in his shoulder for pain and it is not   helping. Please return call on next best steps.   ---------------------------------------------------------------------------  --------------  Terence Sadler INFO  [phone]; OK to leave message on voicemail  ---------------------------------------------------------------------------  --------------  SCRIPT ANSWERS  Relationship to Patient?  Self

## 2023-12-11 ENCOUNTER — TELEPHONE (OUTPATIENT)
Age: 73
End: 2023-12-11

## 2023-12-11 RX ORDER — FLECAINIDE ACETATE 100 MG/1
100 TABLET ORAL 2 TIMES DAILY
Qty: 180 TABLET | Refills: 0 | Status: SHIPPED | OUTPATIENT
Start: 2023-12-11

## 2023-12-11 NOTE — TELEPHONE ENCOUNTER
[facility] for patient to give us a callback. Patient called in because he said he had been having issues contacting the dme company and they had informed him he was not in network with the insurance; after speaking with a representative for the [facility] she stated they had left patient several voicemail's to call them in reference to device.

## 2023-12-11 NOTE — TELEPHONE ENCOUNTER
Refill per VO of Dr. Michelle Martinez  Last appt: 10/13/2023    Future Appointments   Date Time Provider [address]   1/10/2024 11:00 AM [facility] ECHO 1 [facility] BS AMB   10/14/2024 11:00 AM Sharla Dumont MD [facility] AMB       Requested Prescriptions     Pending Prescriptions Disp Refills    flecainide (TAMBOCOR) 100 MG tablet [Pharmacy Med Name: FLECAINIDE TAB 100MG] 180 tablet 0     Sig: TAKE 1 TABLET TWICE A DAY

## 2023-12-14 ENCOUNTER — TELEPHONE (OUTPATIENT)
Age: 73
End: 2023-12-14

## 2023-12-14 NOTE — TELEPHONE ENCOUNTER
Pt called to get refill for xarelto for a 90 day supply and to have an auth for few extras pills to hold  him over. He has 3 remaining.        [facility] # [id number]

## 2023-12-15 NOTE — TELEPHONE ENCOUNTER
Refill per VO of Dr. Ken Waller  Last appt: 10/13/2023    Future Appointments   Date Time Provider [address]   1/10/2024 11:00 AM [facility] ECHO 1 [facility] AMB   10/14/2024 11:00 AM Ira Garland MD [facility] AMB       Requested Prescriptions     Signed Prescriptions Disp Refills    rivaroxaban (XARELTO) 20 MG TABS tablet 100 tablet 3     Sig: Take 1 tablet by mouth Daily with supper     Authorizing Provider: Ira Garland     Ordering User: Martha Jain

## 2024-01-08 ENCOUNTER — TELEPHONE (OUTPATIENT)
Age: 74
End: 2024-01-08

## 2024-01-08 NOTE — TELEPHONE ENCOUNTER
Pt. Asking for a refill on his Metoprolol, Appt. Has been made and he will run out before his appt. In Feb.

## 2024-01-22 ENCOUNTER — TELEPHONE (OUTPATIENT)
Age: 74
End: 2024-01-22

## 2024-01-22 NOTE — TELEPHONE ENCOUNTER
[facility] requesting a copy of updated insurance card to be attached to msg so we can look into auth.

## 2024-01-22 NOTE — TELEPHONE ENCOUNTER
Pt. Has switched Ins. Companies and needs to have an authorization done for a test through his new ins.     Pt. Phone - [phone]

## 2024-01-24 ENCOUNTER — TELEPHONE (OUTPATIENT)
Age: 74
End: 2024-01-24

## 2024-01-24 NOTE — TELEPHONE ENCOUNTER
Fax received from [facility] for clearance.    Procedure: endoscopy procedure  Dr: Jenn Pruett  Medication requested to hold: Luisrelcatie    Fax to: [phone]

## 2024-01-25 NOTE — TELEPHONE ENCOUNTER
Per Dr. Cristiane Miller: \"Can proceed with endoscopy. Hold Xarelto 2 days. \"  Fax to: [phone]

## 2024-02-19 ENCOUNTER — ANCILLARY PROCEDURE (OUTPATIENT)
Age: 74
End: 2024-02-19
Payer: MEDICARE

## 2024-02-19 VITALS
WEIGHT: 252 LBS | SYSTOLIC BLOOD PRESSURE: 111 MMHG | BODY MASS INDEX: 36.08 KG/M2 | HEIGHT: 70 IN | DIASTOLIC BLOOD PRESSURE: 65 MMHG

## 2024-02-19 DIAGNOSIS — R01.1 HEART MURMUR: ICD-10-CM

## 2024-02-19 LAB
ECHO AO ASC DIAM: 3.3 CM
ECHO AO ASCENDING AORTA INDEX: 1.43 CM/M2
ECHO AO ROOT DIAM: 3.1 CM
ECHO AO ROOT INDEX: 1.35 CM/M2
ECHO AV AREA PEAK VELOCITY: 1.9 CM2
ECHO AV AREA VTI: 2.2 CM2
ECHO AV AREA/BSA PEAK VELOCITY: 0.8 CM2/M2
ECHO AV AREA/BSA VTI: 1 CM2/M2
ECHO AV MEAN GRADIENT: 14 MMHG
ECHO AV MEAN VELOCITY: 1.8 M/S
ECHO AV PEAK GRADIENT: 29 MMHG
ECHO AV PEAK VELOCITY: 2.7 M/S
ECHO AV VELOCITY RATIO: 0.52
ECHO AV VTI: 60.1 CM
ECHO BSA: 2.38 M2
ECHO EST RA PRESSURE: 3 MMHG
ECHO LA DIAMETER INDEX: 1.78 CM/M2
ECHO LA DIAMETER: 4.1 CM
ECHO LA TO AORTIC ROOT RATIO: 1.32
ECHO LA VOL A-L A2C: 128 ML (ref 18–58)
ECHO LA VOL A-L A4C: 104 ML (ref 18–58)
ECHO LA VOL BP: 114 ML (ref 18–58)
ECHO LA VOL MOD A2C: 123 ML (ref 18–58)
ECHO LA VOL MOD A4C: 98 ML (ref 18–58)
ECHO LA VOL/BSA BIPLANE: 50 ML/M2 (ref 16–34)
ECHO LA VOLUME AREA LENGTH: 120 ML
ECHO LA VOLUME INDEX A-L A2C: 56 ML/M2 (ref 16–34)
ECHO LA VOLUME INDEX A-L A4C: 45 ML/M2 (ref 16–34)
ECHO LA VOLUME INDEX AREA LENGTH: 52 ML/M2 (ref 16–34)
ECHO LA VOLUME INDEX MOD A2C: 53 ML/M2 (ref 16–34)
ECHO LA VOLUME INDEX MOD A4C: 43 ML/M2 (ref 16–34)
ECHO LV E' LATERAL VELOCITY: 7 CM/S
ECHO LV E' SEPTAL VELOCITY: 5 CM/S
ECHO LV EDV A2C: 206 ML
ECHO LV EDV A4C: 179 ML
ECHO LV EDV BP: 194 ML (ref 67–155)
ECHO LV EDV INDEX A4C: 78 ML/M2
ECHO LV EDV INDEX BP: 84 ML/M2
ECHO LV EDV NDEX A2C: 90 ML/M2
ECHO LV EJECTION FRACTION A2C: 74 %
ECHO LV EJECTION FRACTION A4C: 62 %
ECHO LV EJECTION FRACTION BIPLANE: 68 % (ref 55–100)
ECHO LV ESV A2C: 54 ML
ECHO LV ESV A4C: 69 ML
ECHO LV ESV BP: 61 ML (ref 22–58)
ECHO LV ESV INDEX A2C: 23 ML/M2
ECHO LV ESV INDEX A4C: 30 ML/M2
ECHO LV ESV INDEX BP: 27 ML/M2
ECHO LV FRACTIONAL SHORTENING: 36 % (ref 28–44)
ECHO LV INTERNAL DIMENSION DIASTOLE INDEX: 2.17 CM/M2
ECHO LV INTERNAL DIMENSION DIASTOLIC: 5 CM (ref 4.2–5.9)
ECHO LV INTERNAL DIMENSION SYSTOLIC INDEX: 1.39 CM/M2
ECHO LV INTERNAL DIMENSION SYSTOLIC: 3.2 CM
ECHO LV IVSD: 1.4 CM (ref 0.6–1)
ECHO LV MASS 2D: 247.6 G (ref 88–224)
ECHO LV MASS INDEX 2D: 107.7 G/M2 (ref 49–115)
ECHO LV POSTERIOR WALL DIASTOLIC: 1.1 CM (ref 0.6–1)
ECHO LV RELATIVE WALL THICKNESS RATIO: 0.44
ECHO LVOT AREA: 3.5 CM2
ECHO LVOT AV VTI INDEX: 0.63
ECHO LVOT DIAM: 2.1 CM
ECHO LVOT MEAN GRADIENT: 5 MMHG
ECHO LVOT PEAK GRADIENT: 8 MMHG
ECHO LVOT PEAK VELOCITY: 1.4 M/S
ECHO LVOT STROKE VOLUME INDEX: 57 ML/M2
ECHO LVOT SV: 131.2 ML
ECHO LVOT VTI: 37.9 CM
ECHO MV A VELOCITY: 0.68 M/S
ECHO MV AREA PHT: 2.3 CM2
ECHO MV E DECELERATION TIME (DT): 329.1 MS
ECHO MV E VELOCITY: 0.91 M/S
ECHO MV E/A RATIO: 1.34
ECHO MV E/E' LATERAL: 13
ECHO MV E/E' RATIO (AVERAGED): 15.6
ECHO MV PRESSURE HALF TIME (PHT): 95.4 MS
ECHO RA AREA 4C: 22.3 CM2
ECHO RA END SYSTOLIC VOLUME APICAL 4 CHAMBER INDEX BSA: 26 ML/M2
ECHO RA VOLUME AREA LENGTH APICAL 4 CHAMBER: 59 ML
ECHO RA VOLUME: 60 ML
ECHO RIGHT VENTRICULAR SYSTOLIC PRESSURE (RVSP): 26 MMHG
ECHO RV FREE WALL PEAK S': 13 CM/S
ECHO RV INTERNAL DIMENSION: 4.2 CM
ECHO RV TAPSE: 3 CM (ref 1.7–?)
ECHO TV REGURGITANT MAX VELOCITY: 2.42 M/S
ECHO TV REGURGITANT PEAK GRADIENT: 23 MMHG

## 2024-02-19 PROCEDURE — 93306 TTE W/DOPPLER COMPLETE: CPT | Performed by: SPECIALIST

## 2024-02-23 ENCOUNTER — TELEPHONE (OUTPATIENT)
Age: 74
End: 2024-02-23

## 2024-02-23 NOTE — TELEPHONE ENCOUNTER
Pt called [facility] to a billing issue. His bill has a $70 balance and he stated that he pd $35 of this balance. He's had this discrepancy since Oct. He's now getting threatening collection letters.pt states he's called the billing office and nothing has been resolved. Please advise.    Pt # [phone]

## 2024-05-02 ENCOUNTER — HOSPITAL ENCOUNTER (OUTPATIENT)
Facility: HOSPITAL | Age: 74
Setting detail: OUTPATIENT SURGERY
Discharge: HOME | End: 2024-05-02
Attending: INTERNAL MEDICINE | Admitting: INTERNAL MEDICINE
Payer: MEDICARE

## 2024-05-02 ENCOUNTER — ANESTHESIA (OUTPATIENT)
Facility: HOSPITAL | Age: 74
End: 2024-05-02
Payer: MEDICARE

## 2024-05-02 ENCOUNTER — ANESTHESIA EVENT (OUTPATIENT)
Facility: HOSPITAL | Age: 74
End: 2024-05-02
Payer: MEDICARE

## 2024-05-02 VITALS
OXYGEN SATURATION: 97 % | DIASTOLIC BLOOD PRESSURE: 50 MMHG | SYSTOLIC BLOOD PRESSURE: 148 MMHG | HEIGHT: 69 IN | RESPIRATION RATE: 13 BRPM | HEART RATE: 46 BPM | WEIGHT: 238.2 LBS | BODY MASS INDEX: 35.28 KG/M2 | TEMPERATURE: 97.8 F

## 2024-05-02 LAB
GLUCOSE BLD STRIP.AUTO-MCNC: 130 MG/DL (ref 65–117)
GLUCOSE BLD STRIP.AUTO-MCNC: 61 MG/DL (ref 65–117)
SERVICE CMNT-IMP: ABNORMAL
SERVICE CMNT-IMP: ABNORMAL

## 2024-05-02 PROCEDURE — 3600007502: Performed by: INTERNAL MEDICINE

## 2024-05-02 PROCEDURE — 3700000000 HC ANESTHESIA ATTENDED CARE: Performed by: INTERNAL MEDICINE

## 2024-05-02 PROCEDURE — 2500000003 HC RX 250 WO HCPCS

## 2024-05-02 PROCEDURE — 7100000010 HC PHASE II RECOVERY - FIRST 15 MIN: Performed by: INTERNAL MEDICINE

## 2024-05-02 PROCEDURE — 82962 GLUCOSE BLOOD TEST: CPT

## 2024-05-02 PROCEDURE — 7100000011 HC PHASE II RECOVERY - ADDTL 15 MIN: Performed by: INTERNAL MEDICINE

## 2024-05-02 PROCEDURE — 2580000003 HC RX 258: Performed by: INTERNAL MEDICINE

## 2024-05-02 PROCEDURE — 6360000002 HC RX W HCPCS

## 2024-05-02 RX ORDER — SODIUM CHLORIDE 0.9 % (FLUSH) 0.9 %
5-40 SYRINGE (ML) INJECTION EVERY 12 HOURS SCHEDULED
Status: DISCONTINUED | OUTPATIENT
Start: 2024-05-02 | End: 2024-05-02 | Stop reason: HOSPADM

## 2024-05-02 RX ORDER — SODIUM CHLORIDE 0.9 % (FLUSH) 0.9 %
5-40 SYRINGE (ML) INJECTION PRN
Status: DISCONTINUED | OUTPATIENT
Start: 2024-05-02 | End: 2024-05-02 | Stop reason: HOSPADM

## 2024-05-02 RX ORDER — SODIUM CHLORIDE 9 MG/ML
25 INJECTION, SOLUTION INTRAVENOUS PRN
Status: DISCONTINUED | OUTPATIENT
Start: 2024-05-02 | End: 2024-05-02 | Stop reason: HOSPADM

## 2024-05-02 RX ORDER — SODIUM CHLORIDE 9 MG/ML
INJECTION, SOLUTION INTRAVENOUS CONTINUOUS
Status: DISCONTINUED | OUTPATIENT
Start: 2024-05-02 | End: 2024-05-02 | Stop reason: HOSPADM

## 2024-05-02 RX ORDER — LIDOCAINE HYDROCHLORIDE 20 MG/ML
INJECTION, SOLUTION EPIDURAL; INFILTRATION; INTRACAUDAL; PERINEURAL PRN
Status: DISCONTINUED | OUTPATIENT
Start: 2024-05-02 | End: 2024-05-02

## 2024-05-02 RX ORDER — LIDOCAINE HYDROCHLORIDE 20 MG/ML
INJECTION, SOLUTION EPIDURAL; INFILTRATION; INTRACAUDAL; PERINEURAL PRN
Status: DISCONTINUED | OUTPATIENT
Start: 2024-05-02 | End: 2024-05-02 | Stop reason: SDUPTHER

## 2024-05-02 RX ADMIN — PROPOFOL 50 MG: 10 INJECTION, EMULSION INTRAVENOUS at 13:18

## 2024-05-02 RX ADMIN — LIDOCAINE HYDROCHLORIDE 40 MG: 20 INJECTION, SOLUTION EPIDURAL; INFILTRATION; INTRACAUDAL; PERINEURAL at 13:17

## 2024-05-02 RX ADMIN — PROPOFOL 40 MG: 10 INJECTION, EMULSION INTRAVENOUS at 13:19

## 2024-05-02 RX ADMIN — PROPOFOL 60 MG: 10 INJECTION, EMULSION INTRAVENOUS at 13:17

## 2024-05-02 RX ADMIN — SODIUM CHLORIDE: 9 INJECTION, SOLUTION INTRAVENOUS at 13:11

## 2024-05-02 RX ADMIN — PROPOFOL 50 MG: 10 INJECTION, EMULSION INTRAVENOUS at 13:23

## 2024-05-02 ASSESSMENT — PAIN - FUNCTIONAL ASSESSMENT
PAIN_FUNCTIONAL_ASSESSMENT: 0-10
PAIN_FUNCTIONAL_ASSESSMENT: NONE - DENIES PAIN

## 2024-05-02 NOTE — OP NOTE
[facility]  [facility]  SHARAD Pruett Jr, MD  [phone]      May 2, 2024    Colonoscopy Procedure Note  Fabian Saavedra  :  1950  [facility] Medical Record Number: [id number]    Indications:   Personal history of colon polyps  PCP:  Azael Rosado MD  Anesthesia/Sedation: See Anesthesia Record  Endoscopist:  Dr. SHARAD Pruett Jr  Complications:  None  Estimated Blood Loss:  None    Surgical assistant: Sgaeulator: Alexei Reyes RN  Endoscopy Technician: Debby Ovalle none unless otherwise specified.     Permit:  The indications, risks, benefits and alternatives were reviewed with the patient or their decision maker who was provided an opportunity to ask questions and all questions were answered.  The specific risks of colonoscopy with conscious sedation were reviewed, including but not limited to anesthetic complication, bleeding, adverse drug reaction, missed lesion, infection, IV site reactions, and intestinal perforation which would lead to the need for surgical repair.  Alternatives to colonoscopy including radiographic imaging, observation without testing, or laboratory testing were reviewed including the limitations of those alternatives.  After considering the options and having all their questions answered, the patient or their decision maker provided both verbal and written consent to proceed.        Procedure in Detail:  After obtaining informed consent, positioning of the patient in the left lateral decubitus position, and conduction of a pre-procedure pause or \"time out\" the endoscope was introduced into the anus and advanced to the cecum, which was identified by the ileocecal valve and appendiceal orifice.  The quality of the colonic preparation was good.  A careful inspection was made as the colonoscope was withdrawn, findings and interventions are described below.    Findings:

## 2024-05-02 NOTE — PROGRESS NOTES
Endoscopy recovery  Patient returned to baseline, vital signs stable (see vital sign flowsheet). Patient offered liquids and tolerated well. Respiratory status within defined limits. Abdomen soft not tender. Skin with in defined limits. Responsible party driving patient home was given the opportunity to ask questions. Patient discharged with documented belongings. Discharge instructions reviewed and print out given.  Patient verbalized understanding.     Pt had a milkshake energy drink yesterday during prep, possibly contributing to poor prep

## 2024-05-02 NOTE — ANESTHESIA POSTPROCEDURE EVALUATION
Department of Anesthesiology  Postprocedure Note    Patient: Fabian Saavedra  MRN: [id number]  YOB: 1950  Date of evaluation: 5/2/2024    Procedure Summary       Date: 05/02/24 Room / Location: [facility] ENDO 04 / [facility] ENDOSCOPY    Anesthesia Start: 1311 Anesthesia Stop: 1326    Procedure: COLONOSCOPY DIAGNOSTIC (Lower GI Region) Diagnosis:       History of colon polyps      (History of colon polyps [Z86.010])    Surgeons: Kasia Pruett MD Responsible Provider: Xavier Candelaria MD    Anesthesia Type: MAC ASA Status: 2            Anesthesia Type: MAC    Mikal Phase I: Mikal Score: 10    Mikal Phase II: Mikal Score: 10    Anesthesia Post Evaluation    Patient location during evaluation: PACU  Patient participation: complete - patient participated  Level of consciousness: awake  Pain score: 0  Airway patency: patent  Nausea & Vomiting: no nausea  Cardiovascular status: blood pressure returned to baseline and hemodynamically stable  Respiratory status: acceptable  Hydration status: stable  Pain management: adequate and satisfactory to patient    No notable events documented.

## 2024-05-02 NOTE — INTERVAL H&P NOTE
Pre-Endoscopy H&P Update  Chief complaint/HPI/ROS:  The indication for the procedure, the patient's history and the patient's current medications are reviewed prior to the procedure and that data is reported on the H&P to which this document is attached.  Any significant complaints with regard to organ systems will be noted, and if not mentioned then a review of systems is not contributory.  Past Medical History:   Diagnosis Date    Atrial fibrillation (HCC)     Afib 18 - spont converted to NSR    BPH (benign prostatic hyperplasia)     COPD (chronic obstructive pulmonary disease) (HCC)     mild COPD - 30 pack year smoking     Diabetes mellitus (HCC)     Hearing loss     Hernia of unspecified site of abdominal cavity without mention of obstruction or gangrene     High cholesterol     Hypertension     Iron deficiency anemia     JW visit     Obesity     Sleep apnea     uses CPAP      Past Surgical History:   Procedure Laterality Date    RECTUM SURGERY PROCEDURE UNLISTED       Social   Social History     Tobacco Use    Smoking status: Former     Current packs/day: 0.00     Types: Cigarettes     Quit date: 6/3/1990     Years since quittin.9    Smokeless tobacco: Never   Substance Use Topics    Alcohol use: Yes      Family History   Problem Relation Age of Onset    Stroke Mother       No Known Allergies   Prior to Admission Medications   Prescriptions Last Dose Informant Patient Reported? Taking?   Azelastine HCl 137 MCG/SPRAY SOLN   Yes No   Si (TWO) SPRAY EACH NOSTRIL TWICE DAILY   Patient not taking: Reported on 10/13/2023   Blood Glucose Monitoring Suppl (FREESTYLE PRECISION DANIEL SYSTEM) w/Device KIT   Yes No   Sig: USE TO MONITOR BLOOD GLUCOSE TWICE A DAY   Lancets MISC   Yes No   Sig: Use with Glucose Monitor four times a day   albuterol sulfate HFA (PROVENTIL;VENTOLIN;PROAIR) 108 (90 Base) MCG/ACT inhaler   Yes No   Sig: Inhale into the lungs as needed   ascorbic acid (VITAMIN C) 500 MG tablet   Yes No

## 2024-05-02 NOTE — TELEPHONE ENCOUNTER
Pt called to verify the length of time he should wear his heart monitor he just received in the mail.   Phone # [phone]  Thanks verbal cues/1 person assist No previous uterine incision/Reed Pregnancy/Less than or equal to 4 previous vaginal births/No known bleeding disorder/No history of postpartum hemorrhage/No other PPH risks indicated

## 2024-05-02 NOTE — ANESTHESIA PRE PROCEDURE
Department of Anesthesiology  Preprocedure Note       Name:  Fabian Saavedra   Age:  74 y.o.  :  1950                                          MRN:  [id number]         Date:  2024      Surgeon: Surgeon(s):  Kasia Pruett MD    Procedure: Procedure(s):  COLONOSCOPY DIAGNOSTIC    Medications prior to admission:   Prior to Admission medications    Medication Sig Start Date End Date Taking? Authorizing Provider   metoprolol tartrate (LOPRESSOR) 25 MG tablet Take 1 tablet by mouth 2 times daily 24   Cristiane Miller MD   rivaroxaban (XARELTO) 20 MG TABS tablet Take 1 tablet by mouth Daily with supper 12/15/23   Cristiane Miller MD   flecainide (TAMBOCOR) 100 MG tablet TAKE 1 TABLET TWICE A DAY 23   Cristiane Miller MD   pioglitazone (ACTOS) 30 MG tablet TAKE 1 TABLET DAILY 10/31/23   Raoul Richter MD   sertraline (ZOLOFT) 100 MG tablet TAKE 1 TABLET DAILY 10/31/23   Raoul Richter MD   montelukast (SINGULAIR) 10 MG tablet TAKE 1 TABLET DAILY 10/31/23   Raoul Richter MD   tamsulosin (FLOMAX) 0.4 MG capsule TAKE 1 CAPSULE TWICE DAILY 10/31/23   Raoul Richter MD   fluticasone-umeclidin-vilant (TRELEGY ELLIPTA) 100-62.5-25 MCG/ACT AEPB inhaler Inhale 1 puff into the lungs daily    Radha Mendoza MD   ferrous sulfate (IRON 325) 325 (65 Fe) MG tablet Take 1 tablet by mouth every 72 hours as needed (Every 3 days)    Radha Mendoza MD   losartan (COZAAR) 25 MG tablet Take 1 tablet by mouth daily 10/13/23   Cristiane Miller MD   omeprazole (PRILOSEC) 40 MG delayed release capsule Take 1 capsule by mouth daily 23   Raoul Richter MD   dilTIAZem (TIAZAC) 180 MG extended release capsule Take 1 capsule by mouth daily 23   Raoul Richter MD   rosuvastatin (CRESTOR) 40 MG tablet Take 1 tablet by mouth daily 23   Raoul Richter MD   insulin NPH (HUMULIN N;NOVOLIN N) 100 UNIT/ML injection pen Inject 25 Units into the skin daily    Provider, MD Radha

## 2024-05-02 NOTE — H&P
74 y.o. male presents for open access colonoscopy for surveillance given personal history of colon polyps.  Additional H&P data will be attached on the day of procedure.    Kasia Pruett Jr, MD

## 2024-05-02 NOTE — DISCHARGE INSTRUCTIONS
[facility]  [facility]  SHARAD Jonas Jr, MD  [phone]      May 2, 2024    Fabian Saavedra  YOB: 1950    COLONOSCOPY DISCHARGE INSTRUCTIONS    If there is redness at IV site you should apply warm compress to area.  If redness or soreness persist contact Dr. Jonas's office or your primary care doctor.    There may be a slight amount of blood passed from the rectum.  Gaseous discomfort may develop, but walking, belching will help relieve this.  You may not operate a vehicle for 12 hours  You may not operate machinery or dangerous appliances for rest of today  You may not drink alcoholic beverages for 12 hours  Avoid making any critical decisions for 24 hours    DIET:  You may resume your normal diet, but some patients find that heavy or large meals may lead to indigestion or vomiting.  I suggest a light meal as first food intake.    MEDICATIONS:  The use of some over-the-counter pain medication may lead to bleeding after colon biopsies or polyp removal.  Tylenol (also called acetaminophen) is safe to take even if you have had colonoscopy with polyp removal.  Based on the procedure you had today you may safely take aspirin or aspirin-like products for the next seven (7) days.  Remember that Tylenol (also called acetaminophen) is safe to take after colonoscopy even if you have had biopsies or polyps removed.    ACTIVITY:  You may resume your normal household activities, but it is recommended that you spend the remainder of the day resting -  avoid any strenuous activity.    CALL DR. JONAS'S OFFICE IF:  Increasing pain, nausea, vomiting  Abdominal distension (swelling)  Significant new or increased bleeding (oral or rectal)  Fever/Chills  Chest pain/shortness of breath                       Additional instructions:   Impression:  Poor prep with retained stool throughout the colon.  Able to reach the cecum.   No polyps or mass seen within the confines of an adequate bowel prep.    Recommendations:   Will need repeat colonoscopy with 2-day bowel prep.     Please let me or my office staff know if you have any feedback about today's procedure.    SHARAD Pruett Jr, MD

## 2024-12-12 ENCOUNTER — OFFICE VISIT (OUTPATIENT)
Age: 74
End: 2024-12-12
Payer: MEDICARE

## 2024-12-12 VITALS
WEIGHT: 235 LBS | SYSTOLIC BLOOD PRESSURE: 138 MMHG | OXYGEN SATURATION: 94 % | DIASTOLIC BLOOD PRESSURE: 80 MMHG | HEIGHT: 69 IN | HEART RATE: 52 BPM | BODY MASS INDEX: 34.8 KG/M2

## 2024-12-12 DIAGNOSIS — I10 PRIMARY HYPERTENSION: Primary | ICD-10-CM

## 2024-12-12 DIAGNOSIS — E66.9 OBESITY (BMI 30-39.9): ICD-10-CM

## 2024-12-12 DIAGNOSIS — I48.0 PAROXYSMAL ATRIAL FIBRILLATION (HCC): ICD-10-CM

## 2024-12-12 PROCEDURE — 99214 OFFICE O/P EST MOD 30 MIN: CPT | Performed by: SPECIALIST

## 2024-12-12 PROCEDURE — 3017F COLORECTAL CA SCREEN DOC REV: CPT | Performed by: SPECIALIST

## 2024-12-12 PROCEDURE — 1123F ACP DISCUSS/DSCN MKR DOCD: CPT | Performed by: SPECIALIST

## 2024-12-12 PROCEDURE — 3078F DIAST BP <80 MM HG: CPT | Performed by: SPECIALIST

## 2024-12-12 PROCEDURE — G8484 FLU IMMUNIZE NO ADMIN: HCPCS | Performed by: SPECIALIST

## 2024-12-12 PROCEDURE — 1036F TOBACCO NON-USER: CPT | Performed by: SPECIALIST

## 2024-12-12 PROCEDURE — 93010 ELECTROCARDIOGRAM REPORT: CPT | Performed by: SPECIALIST

## 2024-12-12 PROCEDURE — 3075F SYST BP GE 130 - 139MM HG: CPT | Performed by: SPECIALIST

## 2024-12-12 PROCEDURE — 1126F AMNT PAIN NOTED NONE PRSNT: CPT | Performed by: SPECIALIST

## 2024-12-12 PROCEDURE — G8427 DOCREV CUR MEDS BY ELIG CLIN: HCPCS | Performed by: SPECIALIST

## 2024-12-12 PROCEDURE — 1159F MED LIST DOCD IN RCRD: CPT | Performed by: SPECIALIST

## 2024-12-12 PROCEDURE — G8417 CALC BMI ABV UP PARAM F/U: HCPCS | Performed by: SPECIALIST

## 2024-12-12 PROCEDURE — 93005 ELECTROCARDIOGRAM TRACING: CPT | Performed by: SPECIALIST

## 2024-12-12 PROCEDURE — 1160F RVW MEDS BY RX/DR IN RCRD: CPT | Performed by: SPECIALIST

## 2024-12-12 RX ORDER — ROSUVASTATIN CALCIUM 40 MG/1
40 TABLET, COATED ORAL DAILY
Qty: 90 TABLET | Refills: 3 | Status: SHIPPED | OUTPATIENT
Start: 2024-12-12

## 2024-12-12 RX ORDER — METOPROLOL TARTRATE 25 MG/1
25 TABLET, FILM COATED ORAL 2 TIMES DAILY
Qty: 180 TABLET | Refills: 3 | Status: SHIPPED | OUTPATIENT
Start: 2024-12-12

## 2024-12-12 RX ORDER — LOSARTAN POTASSIUM 25 MG/1
25 TABLET ORAL DAILY
Qty: 90 TABLET | Refills: 3 | Status: SHIPPED | OUTPATIENT
Start: 2024-12-12

## 2024-12-12 RX ORDER — FLECAINIDE ACETATE 100 MG/1
100 TABLET ORAL 2 TIMES DAILY
Qty: 180 TABLET | Refills: 3 | Status: SHIPPED | OUTPATIENT
Start: 2024-12-12

## 2024-12-12 NOTE — PROGRESS NOTES
had concerns including Atrial Fibrillation.    Vitals:    12/12/24 1055 12/12/24 1116   BP: (!) 160/70 138/80   Site: Left Upper Arm Left Upper Arm   Position: Sitting Sitting   Pulse: 52    SpO2: 94%    Weight: 106.6 kg (235 lb)    Height: 1.753 m (5' 9\")         Chest pain No    Refills No        1. Have you been to the ER, urgent care clinic since your last visit? No       Hospitalized since your last visit? No       Where?        Date?

## 2024-12-18 RX ORDER — MONTELUKAST SODIUM 10 MG/1
10 TABLET ORAL DAILY
Qty: 90 TABLET | Refills: 1 | OUTPATIENT
Start: 2024-12-18

## 2024-12-18 RX ORDER — SERTRALINE HYDROCHLORIDE 100 MG/1
100 TABLET, FILM COATED ORAL DAILY
Qty: 90 TABLET | Refills: 1 | OUTPATIENT
Start: 2024-12-18

## 2024-12-19 ENCOUNTER — TELEPHONE (OUTPATIENT)
Age: 74
End: 2024-12-19

## 2024-12-20 NOTE — TELEPHONE ENCOUNTER
Pt r/t call,    He had lost 20-30 lbs, BP was dropping, so stopped taking Diltiazem for a while.  Has started back again, now having /87.    Confirmed taking:  Losartan 25 qd  Metoprolol 25 BID  Flecainide 100 BID    Asking if had anything salty, just peanuts within the last day.    Saw PCP 1-2 months ago and BP slightly elevated at that time.    Checks BP on regular bases; started elevating, so started back on Dilt again.    Confirmed taking: Diltiazem 180 mg    Hasn't had a problem with pulse 50-55.  Discussed pulse parameters.     Will send my chart message with BP readings after the holidays so we can see how things are trending.  Will continue to take Dilt 180 since he is having good pulse rates.

## 2024-12-20 NOTE — TELEPHONE ENCOUNTER
Diltiazem d/c 12/12/24 Dr. Miller    12/12/2024 \"1) PAF   - Afib 5/1/18 on ER presentation - converted to NSR on his own   - He had 10 episodes of symptoms prior then    - Event Monitor 10/2/18 - Afib at at 6:30 PM (-110) and at 7 PM (HR 80-90 bpm)   - On 12/27/18 he was in ER for weakness - was in Afib then     - Lately -- He is doing well overall. Will continue metoprolol, flecainide.  Continue Xarelto.  No notable afib recurrences.\"    Called pt,  LVM, sent [employer] msg

## 2024-12-23 ENCOUNTER — TELEPHONE (OUTPATIENT)
Age: 74
End: 2024-12-23

## 2024-12-23 NOTE — TELEPHONE ENCOUNTER
Spoke with pt who used two identifiers (Name & )  This writer contacted pt regarding refill request we received, pt advised me that the refills should be done by his PCP and that he would contact their office for the refills.

## 2025-01-15 ENCOUNTER — TELEPHONE (OUTPATIENT)
Age: 75
End: 2025-01-15

## 2025-01-15 RX ORDER — DILTIAZEM HYDROCHLORIDE 180 MG/1
180 CAPSULE, COATED, EXTENDED RELEASE ORAL DAILY
Qty: 90 CAPSULE | Refills: 3 | Status: SHIPPED | OUTPATIENT
Start: 2025-01-15

## 2025-01-15 NOTE — TELEPHONE ENCOUNTER
MD reviewed BP log,  Per Dr. Miller, \"Cont Dilt 180 daily; goal BP <130/90\"    Called pt, confirmed pharmacy.    Refill per VO of Dr. Miller  Last appt: 12/12/2024    Future Appointments   Date Time Provider Department Center   4/2/2025 11:00 AM Yvonne Worthington MD [facility] AMB   12/15/2025 11:20 AM Cristiane Miller MD [facility] AMB       Requested Prescriptions     Pending Prescriptions Disp Refills    dilTIAZem (CARDIZEM CD) 180 MG extended release capsule 90 capsule 3     Sig: Take 1 capsule by mouth daily

## 2025-01-15 NOTE — TELEPHONE ENCOUNTER
Patient called wanting to know if his blood pressure log had been reviewed. Patient is almost out of diltiazem and not sure if he should continue taking the medication.    # [phone]

## 2025-04-01 NOTE — PROGRESS NOTES
Cardiac Electrophysiology OFFICE Consultation Note       Assessment/Plan:   1. Paroxysmal atrial fibrillation (HCC)  -     EKG 12 Lead  -     Exercise stress test; Future  2. Primary hypertension  -     EKG 12 Lead  -     Exercise stress test; Future  3. Hyperlipidemia, unspecified hyperlipidemia type  -     EKG 12 Lead  -     Exercise stress test; Future  4. Obesity (BMI 30-39.9)  -     EKG 12 Lead  -     Exercise stress test; Future  5. Bradycardia  -     Exercise stress test; Future  6. High risk medication use       Primary Cardiologist: Paul    Sinus Bradycardia  HR consistently 40-50s. Unclear if he has chronotropic incompetence. Not much of a complainer.   - Reduce metoprolol to 12.5 mg BID  - Reduce flecainide to 50 mg BID  - Treadmill stress test to evaluate for chronotropic intolerance   - obtain 2 week event monitor  - FU with me in 6 months     Paroxysmal Atrial Fibrillation  - Diagnosed 2018  - Echo 2/20/24 - LVEF 60-65%, Mild Aortic stenosis (AV mean gradient 14 mmHg), LAE  - Controlled on metoprolol and flecainide with no known recurrence  - will prevent significant medication side effects with reducing Metoprolol and Flecainide  - 2 week monitor  - might be able to come off of medications in the future    High Risk Medication use  We discussed in great detail regarding high risk medication management and the associated risks of antiarrhythmic therapy.  Patient reports full understanding of recommendations.  -Risks associated with Flecainide including but not limited to risks of QRS widening the need for long term monitoring with labs and imaging.  - being monitor for drug side effects and toxicity.  - EKG demonstrated NC of 221 ms and QRS of 100 ms  - obtain ETT     Anticoagulation  - continue Xarelto  - Denies of any bleeding issues. Know to contact clinic with any bleeding side effects (BRBPR, melena, hemoptysis, hematuria).     Hypertension  BP usually 130/60s  - Continue metoprolol and

## 2025-04-02 ENCOUNTER — OFFICE VISIT (OUTPATIENT)
Age: 75
End: 2025-04-02
Payer: MEDICARE

## 2025-04-02 ENCOUNTER — TELEPHONE (OUTPATIENT)
Age: 75
End: 2025-04-02

## 2025-04-02 VITALS
SYSTOLIC BLOOD PRESSURE: 100 MMHG | WEIGHT: 231 LBS | OXYGEN SATURATION: 97 % | HEART RATE: 50 BPM | HEIGHT: 69 IN | DIASTOLIC BLOOD PRESSURE: 62 MMHG | RESPIRATION RATE: 16 BRPM | BODY MASS INDEX: 34.21 KG/M2

## 2025-04-02 DIAGNOSIS — I10 PRIMARY HYPERTENSION: ICD-10-CM

## 2025-04-02 DIAGNOSIS — E78.5 HYPERLIPIDEMIA, UNSPECIFIED HYPERLIPIDEMIA TYPE: ICD-10-CM

## 2025-04-02 DIAGNOSIS — R00.1 BRADYCARDIA: ICD-10-CM

## 2025-04-02 DIAGNOSIS — I48.0 PAROXYSMAL ATRIAL FIBRILLATION (HCC): Primary | ICD-10-CM

## 2025-04-02 DIAGNOSIS — E66.9 OBESITY (BMI 30-39.9): ICD-10-CM

## 2025-04-02 DIAGNOSIS — Z79.899 HIGH RISK MEDICATION USE: ICD-10-CM

## 2025-04-02 PROCEDURE — 3074F SYST BP LT 130 MM HG: CPT | Performed by: INTERNAL MEDICINE

## 2025-04-02 PROCEDURE — 1036F TOBACCO NON-USER: CPT | Performed by: INTERNAL MEDICINE

## 2025-04-02 PROCEDURE — 93005 ELECTROCARDIOGRAM TRACING: CPT | Performed by: INTERNAL MEDICINE

## 2025-04-02 PROCEDURE — 1125F AMNT PAIN NOTED PAIN PRSNT: CPT | Performed by: INTERNAL MEDICINE

## 2025-04-02 PROCEDURE — 93010 ELECTROCARDIOGRAM REPORT: CPT | Performed by: INTERNAL MEDICINE

## 2025-04-02 PROCEDURE — 1123F ACP DISCUSS/DSCN MKR DOCD: CPT | Performed by: INTERNAL MEDICINE

## 2025-04-02 PROCEDURE — 3078F DIAST BP <80 MM HG: CPT | Performed by: INTERNAL MEDICINE

## 2025-04-02 PROCEDURE — G8417 CALC BMI ABV UP PARAM F/U: HCPCS | Performed by: INTERNAL MEDICINE

## 2025-04-02 PROCEDURE — 1159F MED LIST DOCD IN RCRD: CPT | Performed by: INTERNAL MEDICINE

## 2025-04-02 PROCEDURE — 99204 OFFICE O/P NEW MOD 45 MIN: CPT | Performed by: INTERNAL MEDICINE

## 2025-04-02 PROCEDURE — G2211 COMPLEX E/M VISIT ADD ON: HCPCS | Performed by: INTERNAL MEDICINE

## 2025-04-02 PROCEDURE — G8427 DOCREV CUR MEDS BY ELIG CLIN: HCPCS | Performed by: INTERNAL MEDICINE

## 2025-04-02 PROCEDURE — 3017F COLORECTAL CA SCREEN DOC REV: CPT | Performed by: INTERNAL MEDICINE

## 2025-04-02 RX ORDER — FLECAINIDE ACETATE 50 MG/1
50 TABLET ORAL 2 TIMES DAILY
Qty: 30 TABLET | Refills: 3 | Status: SHIPPED | OUTPATIENT
Start: 2025-04-02

## 2025-04-02 RX ORDER — METOPROLOL TARTRATE 25 MG/1
12.5 TABLET, FILM COATED ORAL 2 TIMES DAILY
Qty: 30 TABLET | Refills: 3 | Status: SHIPPED | OUTPATIENT
Start: 2025-04-02

## 2025-04-02 RX ORDER — CETIRIZINE HYDROCHLORIDE 10 MG/1
10 TABLET ORAL DAILY
COMMUNITY

## 2025-04-02 ASSESSMENT — PATIENT HEALTH QUESTIONNAIRE - PHQ9
DEPRESSION UNABLE TO ASSESS: PT REFUSES
DEPRESSION UNABLE TO ASSESS: PT REFUSES

## 2025-04-02 NOTE — PROGRESS NOTES
Chief Complaint   Patient presents with    Atrial Fibrillation    Hypertension    Hyperlipidemia     Gets Chest  pain 2/10 when he takes deep breaths     Shortness of breath on exertion occasionally if he over does it    Occasional dizziness -

## 2025-04-02 NOTE — TELEPHONE ENCOUNTER
----- Message from SIMON TAMEZ RN sent at 2025 11:29 AM EDT -----  Regardin week monitor  Hi!    Can you please order a 2 week monitor for this pt? DX bradycardia per Worthington    Thank you  SIMON Oneil

## 2025-04-28 ENCOUNTER — ANCILLARY PROCEDURE (OUTPATIENT)
Age: 75
End: 2025-04-28
Payer: MEDICARE

## 2025-04-28 VITALS
HEIGHT: 69 IN | HEART RATE: 55 BPM | SYSTOLIC BLOOD PRESSURE: 104 MMHG | BODY MASS INDEX: 34.21 KG/M2 | DIASTOLIC BLOOD PRESSURE: 66 MMHG | WEIGHT: 231 LBS

## 2025-04-28 DIAGNOSIS — E78.5 HYPERLIPIDEMIA, UNSPECIFIED HYPERLIPIDEMIA TYPE: ICD-10-CM

## 2025-04-28 DIAGNOSIS — I48.0 PAROXYSMAL ATRIAL FIBRILLATION (HCC): ICD-10-CM

## 2025-04-28 DIAGNOSIS — R00.1 BRADYCARDIA: ICD-10-CM

## 2025-04-28 DIAGNOSIS — I10 PRIMARY HYPERTENSION: ICD-10-CM

## 2025-04-28 DIAGNOSIS — E66.9 OBESITY (BMI 30-39.9): ICD-10-CM

## 2025-04-28 PROCEDURE — 93017 CV STRESS TEST TRACING ONLY: CPT | Performed by: INTERNAL MEDICINE

## 2025-05-01 LAB
ECHO BSA: 2.26 M2
STRESS ANGINA INDEX: 0
STRESS BASELINE DIAS BP: 66 MMHG
STRESS BASELINE HR: 54 BPM
STRESS BASELINE SYS BP: 104 MMHG
STRESS ESTIMATED WORKLOAD: 4.8 METS
STRESS EXERCISE DUR MIN: 5 MIN
STRESS EXERCISE DUR SEC: 5 SEC
STRESS O2 SAT PEAK: 99 %
STRESS O2 SAT REST: 97 %
STRESS PEAK DIAS BP: 72 MMHG
STRESS PEAK SYS BP: 122 MMHG
STRESS PERCENT HR ACHIEVED: 50 %
STRESS POST PEAK HR: 72 BPM
STRESS RATE PRESSURE PRODUCT: 8784 BPM*MMHG
STRESS SR DUKE TREADMILL SCORE: 5
STRESS ST DEPRESSION: 0 MM
STRESS TARGET HR: 145 BPM

## 2025-05-01 PROCEDURE — 93018 CV STRESS TEST I&R ONLY: CPT | Performed by: INTERNAL MEDICINE

## 2025-05-01 PROCEDURE — 93016 CV STRESS TEST SUPVJ ONLY: CPT | Performed by: INTERNAL MEDICINE

## 2025-05-05 ENCOUNTER — TELEPHONE (OUTPATIENT)
Age: 75
End: 2025-05-05

## 2025-05-05 ENCOUNTER — RESULTS FOLLOW-UP (OUTPATIENT)
Age: 75
End: 2025-05-05

## 2025-05-05 ENCOUNTER — OFFICE VISIT (OUTPATIENT)
Age: 75
End: 2025-05-05
Payer: MEDICARE

## 2025-05-05 VITALS
BODY MASS INDEX: 33.77 KG/M2 | HEIGHT: 69 IN | OXYGEN SATURATION: 94 % | SYSTOLIC BLOOD PRESSURE: 126 MMHG | HEART RATE: 61 BPM | RESPIRATION RATE: 18 BRPM | WEIGHT: 228 LBS | DIASTOLIC BLOOD PRESSURE: 58 MMHG

## 2025-05-05 DIAGNOSIS — I10 PRIMARY HYPERTENSION: ICD-10-CM

## 2025-05-05 DIAGNOSIS — I45.89 CHRONOTROPIC INCOMPETENCE: ICD-10-CM

## 2025-05-05 DIAGNOSIS — I49.5 SINOATRIAL NODE DYSFUNCTION (HCC): ICD-10-CM

## 2025-05-05 DIAGNOSIS — I35.0 NONRHEUMATIC AORTIC VALVE STENOSIS: ICD-10-CM

## 2025-05-05 DIAGNOSIS — G47.33 OSA ON CPAP: ICD-10-CM

## 2025-05-05 DIAGNOSIS — I48.0 PAF (PAROXYSMAL ATRIAL FIBRILLATION) (HCC): ICD-10-CM

## 2025-05-05 DIAGNOSIS — I49.5 SICK SINUS SYNDROME (HCC): Primary | ICD-10-CM

## 2025-05-05 DIAGNOSIS — I45.89: ICD-10-CM

## 2025-05-05 DIAGNOSIS — Z79.01 ANTICOAGULATED: ICD-10-CM

## 2025-05-05 PROCEDURE — 3078F DIAST BP <80 MM HG: CPT | Performed by: NURSE PRACTITIONER

## 2025-05-05 PROCEDURE — G8417 CALC BMI ABV UP PARAM F/U: HCPCS | Performed by: NURSE PRACTITIONER

## 2025-05-05 PROCEDURE — G8427 DOCREV CUR MEDS BY ELIG CLIN: HCPCS | Performed by: NURSE PRACTITIONER

## 2025-05-05 PROCEDURE — 3074F SYST BP LT 130 MM HG: CPT | Performed by: NURSE PRACTITIONER

## 2025-05-05 PROCEDURE — 1036F TOBACCO NON-USER: CPT | Performed by: NURSE PRACTITIONER

## 2025-05-05 PROCEDURE — 3017F COLORECTAL CA SCREEN DOC REV: CPT | Performed by: NURSE PRACTITIONER

## 2025-05-05 PROCEDURE — 1123F ACP DISCUSS/DSCN MKR DOCD: CPT | Performed by: NURSE PRACTITIONER

## 2025-05-05 PROCEDURE — 99214 OFFICE O/P EST MOD 30 MIN: CPT | Performed by: NURSE PRACTITIONER

## 2025-05-05 PROCEDURE — 1159F MED LIST DOCD IN RCRD: CPT | Performed by: NURSE PRACTITIONER

## 2025-05-05 PROCEDURE — 1126F AMNT PAIN NOTED NONE PRSNT: CPT | Performed by: NURSE PRACTITIONER

## 2025-05-05 RX ORDER — CHLORHEXIDINE GLUCONATE 40 MG/ML
SOLUTION TOPICAL NIGHTLY
Qty: 1 EACH | Refills: 0 | Status: SHIPPED | OUTPATIENT
Start: 2025-05-05

## 2025-05-05 ASSESSMENT — PATIENT HEALTH QUESTIONNAIRE - PHQ9
SUM OF ALL RESPONSES TO PHQ QUESTIONS 1-9: 0
2. FEELING DOWN, DEPRESSED OR HOPELESS: NOT AT ALL
SUM OF ALL RESPONSES TO PHQ QUESTIONS 1-9: 0
1. LITTLE INTEREST OR PLEASURE IN DOING THINGS: NOT AT ALL

## 2025-05-05 NOTE — PROGRESS NOTES
Room #: 2    Chief Complaint   Patient presents with    Follow-up     Discuss pacemaker       Vitals:    05/05/25 1344   BP: (!) 126/58   BP Site: Right Upper Arm   Patient Position: Sitting   BP Cuff Size: Medium Adult   Pulse: 61   Resp: 18   SpO2: 94%   Weight: 103.4 kg (228 lb)   Height: 1.753 m (5' 9\")         Chest pain:  NO    Have you been to the ER, urgent care, or hospitalized outside of [facility] since your last visit?   NO    Refills:  NO  
evidence for acute intracranial hemorrhage, midline shift, or mass effect.  Mild bilateral subcortical and periventricular areas of patchy low attenuation  is nonspecific but likely related to changes of chronic small vessel ischemic  disease.    The basal cisterns are patent.    The visualized paranasal sinuses and mastoid air cells are clear.    No calvarial abnormality.    Impression  IMPRESSION:    No evidence for acute intracranial abnormality. Mild bilateral subcortical and  periventricular areas of patchy low attenuation is nonspecific but likely  related to changes of chronic small vessel ischemic disease.    MRI Result (most recent):  No results found for this or any previous visit from the past 3650 days.          Current meds:  Current Outpatient Medications   Medication Sig Dispense Refill    chlorhexidine gluconate (ANTISEPTIC SKIN CLEANSER) 4 % SOLN external solution Apply topically nightly Start cleanse 3 days prior to procedure 1. Rinse area (upper chest and upper arms) with water. 2. Apply minimum amount necessary to scrub the upper chest area from shoulder/neck to mid line of chest and to below the nipple each of 3 nights before the day of the procedure 3. Let solution dry. 1 each 0    cetirizine (ZYRTEC) 10 MG tablet Take 1 tablet by mouth daily      metoprolol tartrate (LOPRESSOR) 25 MG tablet Take 0.5 tablets by mouth 2 times daily 30 tablet 3    flecainide (TAMBOCOR) 50 MG tablet Take 1 tablet by mouth 2 times daily 30 tablet 3    losartan (COZAAR) 25 MG tablet Take 1 tablet by mouth daily 90 tablet 3    rivaroxaban (XARELTO) 20 MG TABS tablet Take 1 tablet by mouth Daily with supper 90 tablet 3    rosuvastatin (CRESTOR) 40 MG tablet Take 1 tablet by mouth daily 90 tablet 3    cyanocobalamin 1000 MCG tablet Take 1 tablet by mouth every 7 days      pioglitazone (ACTOS) 30 MG tablet TAKE 1 TABLET DAILY 90 tablet 1    sertraline (ZOLOFT) 100 MG tablet TAKE 1 TABLET DAILY (Patient taking

## 2025-05-05 NOTE — TELEPHONE ENCOUNTER
Spoke to Pt of New Implant Dual Chamber PPM Procedure with Dr. Worthington on 7/7/25  at 8am arriving at 7am.  Please NPO from Midnight the night prior to the procedure. Please have lab work done Between 6/7/25-6/30/25. Check in at the 2nd floor OutPt Registation desk at [facility].    Medications:  Hold xarelto the morning of the procedure     VO BY Dr. Worthington/Nurse [place] BYRD

## 2025-05-05 NOTE — PATIENT INSTRUCTIONS
Dual chamber pacemaker CPT code: 14571    You are scheduled for the following procedure with Dr. Worthington:  New Implant Dual Chamber PPM at [facility] [place] [address] [place] [address]      PLEASE be aware that your procedure date/time is tentative and subject to change due to emergency cases.    Any changes to your procedure date/time will be communicated by the hospital staff.      Procedure date/time:    Monday, July 7, 2025 at  8:00 am - please arrive by  7:00 am      ARRIVAL time:  (You will need someone to drive you home.)     [X]  [facility] procedures: please arrive to check in on 2nd floor one hour prior to your procedure the day of your procedure.        Pre-procedure Labs/Imaging:    PRE-PROCEDURE LABS NEEDED: Yes - please have these done after 6/7/25 and before 6/30/25  Forms for labwork may be given at appointment. If not, they will be mailed to you.  These can be done at any [facility] or [facility] lab.      [facility]  [address], [address]  [place], [place] [address]  Monday-Friday 730A-430P (closed 1230-130P)  [phone]    [address], [address]   [place] [address]  Monday-Friday 8:00AM - 5:00 PM   [phone]    [facility]  [address], [address]  [place], [place] [address]  Monday-Friday 7A-5P  [phone]    [facility]  [address]  [place], [place] [address]  Monday-Friday 730A-430P  [phone] (closed 1-2P)    [facility]  [address] , [address]  [place], [place] [address]  Monday-Friday 730A-430P (closed 1230-130P)  [phone]    [facility]  [address] [place], [place] [address]  Monday-Friday 7A-430P  [phone]    [facility]  [address]  [place], [place] [address]  Monday-Friday 160A-756P (closed

## 2025-05-19 RX ORDER — METOPROLOL TARTRATE 25 MG/1
12.5 TABLET, FILM COATED ORAL 2 TIMES DAILY
Qty: 90 TABLET | Refills: 1 | Status: SHIPPED | OUTPATIENT
Start: 2025-05-19

## 2025-05-19 NOTE — TELEPHONE ENCOUNTER
Ordered Per  Verbal Order.     Last appt: 5/5/2025   Future Appointments   Date Time Provider Department Center   7/21/2025 11:00 AM PACEMAKER, [facility]INDER  AMB   10/9/2025  1:20 PM PACEMAKER, [facility] AMB   10/9/2025  1:40 PM [facility]   10/15/2025 11:00 AM [facility]   12/15/2025 11:20 AM Cristiane Miller MD [facility] AMB       Requested Prescriptions     Pending Prescriptions Disp Refills    metoprolol tartrate (LOPRESSOR) 25 MG tablet 30 tablet 5     Sig: Take 0.5 tablets by mouth 2 times daily         Prior labs and Blood pressures:  BP Readings from Last 3 Encounters:   05/05/25 (!) 126/58   04/28/25 104/66   04/02/25 100/62     Lab Results   Component Value Date/Time     11/02/2023 11:31 AM    K 4.7 11/02/2023 11:31 AM     11/02/2023 11:31 AM    CO2 22 11/02/2023 11:31 AM    BUN 22 11/02/2023 11:31 AM     No results found for: \"HBA1C\", \"[id number]\"  Lab Results   Component Value Date/Time    CHOL 135 11/02/2023 11:31 AM    CHOL 147 11/02/2023 11:31 AM    HDL 55 11/02/2023 11:31 AM    LDL 65 11/02/2023 11:31 AM     No results found for: \"VITD3\"    Lab Results   Component Value Date/Time    TSH 0.762 11/02/2023 11:31 AM

## 2025-06-25 DIAGNOSIS — I48.0 PAF (PAROXYSMAL ATRIAL FIBRILLATION) (HCC): ICD-10-CM

## 2025-06-25 DIAGNOSIS — I49.5 SICK SINUS SYNDROME (HCC): ICD-10-CM

## 2025-06-25 DIAGNOSIS — I45.89 CHRONOTROPIC INCOMPETENCE: ICD-10-CM

## 2025-06-25 DIAGNOSIS — G47.33 OSA ON CPAP: ICD-10-CM

## 2025-06-25 DIAGNOSIS — I10 PRIMARY HYPERTENSION: ICD-10-CM

## 2025-06-25 DIAGNOSIS — I35.0 NONRHEUMATIC AORTIC VALVE STENOSIS: ICD-10-CM

## 2025-06-25 DIAGNOSIS — Z79.01 ANTICOAGULATED: ICD-10-CM

## 2025-06-26 LAB
ANION GAP SERPL CALC-SCNC: 5 MMOL/L (ref 2–12)
BUN SERPL-MCNC: 18 MG/DL (ref 6–20)
BUN/CREAT SERPL: 19 (ref 12–20)
CALCIUM SERPL-MCNC: 8.8 MG/DL (ref 8.5–10.1)
CHLORIDE SERPL-SCNC: 111 MMOL/L (ref 97–108)
CO2 SERPL-SCNC: 25 MMOL/L (ref 21–32)
CREAT SERPL-MCNC: 0.95 MG/DL (ref 0.7–1.3)
ERYTHROCYTE [DISTWIDTH] IN BLOOD BY AUTOMATED COUNT: 13.4 % (ref 11.5–14.5)
GLUCOSE SERPL-MCNC: 129 MG/DL (ref 65–100)
HCT VFR BLD AUTO: 39 % (ref 36.6–50.3)
HGB BLD-MCNC: 13.1 G/DL (ref 12.1–17)
MCH RBC QN AUTO: 31.3 PG (ref 26–34)
MCHC RBC AUTO-ENTMCNC: 33.6 G/DL (ref 30–36.5)
MCV RBC AUTO: 93.1 FL (ref 80–99)
NRBC # BLD: 0 K/UL (ref 0–0.01)
NRBC BLD-RTO: 0 PER 100 WBC
PLATELET # BLD AUTO: 131 K/UL (ref 150–400)
PMV BLD AUTO: 13 FL (ref 8.9–12.9)
POTASSIUM SERPL-SCNC: 3.8 MMOL/L (ref 3.5–5.1)
RBC # BLD AUTO: 4.19 M/UL (ref 4.1–5.7)
SODIUM SERPL-SCNC: 141 MMOL/L (ref 136–145)
WBC # BLD AUTO: 4.5 K/UL (ref 4.1–11.1)

## 2025-06-27 ENCOUNTER — RESULTS FOLLOW-UP (OUTPATIENT)
Age: 75
End: 2025-06-27

## 2025-07-02 ENCOUNTER — PREP FOR PROCEDURE (OUTPATIENT)
Age: 75
End: 2025-07-02

## 2025-07-02 ENCOUNTER — TELEPHONE (OUTPATIENT)
Age: 75
End: 2025-07-02

## 2025-07-02 RX ORDER — SODIUM CHLORIDE 0.9 % (FLUSH) 0.9 %
5-40 SYRINGE (ML) INJECTION EVERY 12 HOURS SCHEDULED
Status: CANCELLED | OUTPATIENT
Start: 2025-07-02

## 2025-07-02 RX ORDER — SODIUM CHLORIDE 0.9 % (FLUSH) 0.9 %
5-40 SYRINGE (ML) INJECTION PRN
Status: CANCELLED | OUTPATIENT
Start: 2025-07-02

## 2025-07-02 RX ORDER — SODIUM CHLORIDE 9 MG/ML
INJECTION, SOLUTION INTRAVENOUS PRN
Status: CANCELLED | OUTPATIENT
Start: 2025-07-02

## 2025-07-02 NOTE — TELEPHONE ENCOUNTER
Received call from patient, ID verified using two patient identifiers.  Notified Mr. Saavedra of his new arrival time of 8:00 am for his procedure on Monday, 7/7/25.    Patient verbalized understanding and will call back with any other questions or concerns.

## 2025-07-02 NOTE — TELEPHONE ENCOUNTER
Patient is scheduled for a dual chamber ppm with Dr. Worthington on 7/7/25 at [facility]. Procedure time adjusted and patient will now need to arrive by 8:00 am to [facility]    Attempted to reach patient by telephone. A message was left for return call.

## 2025-07-06 NOTE — DISCHARGE INSTRUCTIONS
Electrophysiology / Cardiology    [facility] [place]  [facility]  [address], [address]        [address]  [place] [id number]         [place], [place]  [address] [phone] / [phone] Fax       [phone] / [phone] Fax    Post Sedation: Care Instructions  Overview  Sedation is the use of medicine to help you feel relaxed and comfortable during a procedure. The medicine is usually given in a vein (by IV).   There are different levels of sedation. They range from being awake but relaxed to being completely unconscious. Which level you have will depend on the procedure and your needs. You will be watched closely by a doctor or nurse during sedation.  Common side effects from sedation include:  Feeling sleepy or tired. (Your doctors and nurses will make sure you aren't too sleepy to go home.)  Feeling dizzy or unsteady.  How can you care for yourself at home?  Activity    Don't do anything that requires attention to detail until you recover. This includes going to work or school, making important decisions, and signing any legal documents. It takes time for the medicine effects to completely wear off.     For at least 24 hours, do not drive or operate any machinery.     When you get home, make sure to rest until the anesthesia has worn off. Some people will feel drowsy or dizzy for up to a few hours after leaving the hospital.     Take your time and walk slowly. Sudden changes in position may cause nausea.     Rest when you feel tired. Getting enough sleep will help you recover.     If you have sleep apnea and you have a CPAP machine, be sure to use it.   Diet    Don't drink alcohol for 24 hours.     If your stomach is upset, try clear liquids and bland, low-fat foods like plain toast or rice.     Drink plenty of fluids (unless your doctor tells you not to).   When should you call for

## 2025-07-07 ENCOUNTER — HOSPITAL ENCOUNTER (OUTPATIENT)
Facility: HOSPITAL | Age: 75
Setting detail: OUTPATIENT SURGERY
Discharge: HOME | End: 2025-07-07
Attending: INTERNAL MEDICINE | Admitting: INTERNAL MEDICINE
Payer: MEDICARE

## 2025-07-07 ENCOUNTER — APPOINTMENT (OUTPATIENT)
Facility: HOSPITAL | Age: 75
End: 2025-07-07
Attending: INTERNAL MEDICINE
Payer: MEDICARE

## 2025-07-07 VITALS
HEIGHT: 69 IN | RESPIRATION RATE: 16 BRPM | BODY MASS INDEX: 33.33 KG/M2 | OXYGEN SATURATION: 94 % | WEIGHT: 225 LBS | SYSTOLIC BLOOD PRESSURE: 114 MMHG | TEMPERATURE: 97.4 F | DIASTOLIC BLOOD PRESSURE: 64 MMHG | HEART RATE: 61 BPM

## 2025-07-07 DIAGNOSIS — I49.5 SINOATRIAL NODE DYSFUNCTION (HCC): ICD-10-CM

## 2025-07-07 DIAGNOSIS — I45.89: ICD-10-CM

## 2025-07-07 LAB
ECHO BSA: 2.23 M2
GLUCOSE BLD STRIP.AUTO-MCNC: 137 MG/DL (ref 65–117)
SERVICE CMNT-IMP: ABNORMAL

## 2025-07-07 PROCEDURE — 71045 X-RAY EXAM CHEST 1 VIEW: CPT

## 2025-07-07 PROCEDURE — 6360000002 HC RX W HCPCS: Performed by: INTERNAL MEDICINE

## 2025-07-07 PROCEDURE — 99153 MOD SED SAME PHYS/QHP EA: CPT | Performed by: INTERNAL MEDICINE

## 2025-07-07 PROCEDURE — 6370000000 HC RX 637 (ALT 250 FOR IP)

## 2025-07-07 PROCEDURE — C1892 INTRO/SHEATH,FIXED,PEEL-AWAY: HCPCS | Performed by: INTERNAL MEDICINE

## 2025-07-07 PROCEDURE — 2500000003 HC RX 250 WO HCPCS: Performed by: INTERNAL MEDICINE

## 2025-07-07 PROCEDURE — 2709999900 HC NON-CHARGEABLE SUPPLY: Performed by: INTERNAL MEDICINE

## 2025-07-07 PROCEDURE — 33208 INSRT HEART PM ATRIAL & VENT: CPT | Performed by: INTERNAL MEDICINE

## 2025-07-07 PROCEDURE — 82962 GLUCOSE BLOOD TEST: CPT

## 2025-07-07 PROCEDURE — C1785 PMKR, DUAL, RATE-RESP: HCPCS | Performed by: INTERNAL MEDICINE

## 2025-07-07 PROCEDURE — 76937 US GUIDE VASCULAR ACCESS: CPT | Performed by: INTERNAL MEDICINE

## 2025-07-07 PROCEDURE — 99152 MOD SED SAME PHYS/QHP 5/>YRS: CPT | Performed by: INTERNAL MEDICINE

## 2025-07-07 PROCEDURE — C1898 LEAD, PMKR, OTHER THAN TRANS: HCPCS | Performed by: INTERNAL MEDICINE

## 2025-07-07 DEVICE — IPG W1DR01 AZURE XT DR MRI USA
Type: IMPLANTABLE DEVICE | Status: FUNCTIONAL
Brand: AZURE™ XT DR MRI SURESCAN™

## 2025-07-07 DEVICE — LEAD 5076-52 RCMCRD MRI US
Type: IMPLANTABLE DEVICE | Status: FUNCTIONAL
Brand: CAPSUREFIX NOVUS MRI™ SURESCAN®

## 2025-07-07 DEVICE — LEAD 5076-58 MRI US RCMCRD
Type: IMPLANTABLE DEVICE | Status: FUNCTIONAL
Brand: CAPSUREFIX NOVUS MRI™ SURESCAN®

## 2025-07-07 RX ORDER — ONDANSETRON 2 MG/ML
4 INJECTION INTRAMUSCULAR; INTRAVENOUS EVERY 6 HOURS PRN
Status: DISCONTINUED | OUTPATIENT
Start: 2025-07-07 | End: 2025-07-07 | Stop reason: HOSPADM

## 2025-07-07 RX ORDER — SODIUM CHLORIDE 9 MG/ML
INJECTION, SOLUTION INTRAVENOUS PRN
Status: DISCONTINUED | OUTPATIENT
Start: 2025-07-07 | End: 2025-07-07 | Stop reason: HOSPADM

## 2025-07-07 RX ORDER — SODIUM CHLORIDE 0.9 % (FLUSH) 0.9 %
5-40 SYRINGE (ML) INJECTION PRN
Status: DISCONTINUED | OUTPATIENT
Start: 2025-07-07 | End: 2025-07-07 | Stop reason: HOSPADM

## 2025-07-07 RX ORDER — SODIUM CHLORIDE 0.9 % (FLUSH) 0.9 %
5-40 SYRINGE (ML) INJECTION EVERY 12 HOURS SCHEDULED
Status: DISCONTINUED | OUTPATIENT
Start: 2025-07-07 | End: 2025-07-07 | Stop reason: HOSPADM

## 2025-07-07 RX ORDER — ACETAMINOPHEN 325 MG/1
650 TABLET ORAL EVERY 4 HOURS PRN
Status: DISCONTINUED | OUTPATIENT
Start: 2025-07-07 | End: 2025-07-07 | Stop reason: HOSPADM

## 2025-07-07 RX ORDER — FENTANYL CITRATE 50 UG/ML
INJECTION, SOLUTION INTRAMUSCULAR; INTRAVENOUS PRN
Status: DISCONTINUED | OUTPATIENT
Start: 2025-07-07 | End: 2025-07-07 | Stop reason: HOSPADM

## 2025-07-07 RX ORDER — DIPHENHYDRAMINE HYDROCHLORIDE 50 MG/ML
INJECTION, SOLUTION INTRAMUSCULAR; INTRAVENOUS PRN
Status: DISCONTINUED | OUTPATIENT
Start: 2025-07-07 | End: 2025-07-07 | Stop reason: HOSPADM

## 2025-07-07 RX ORDER — CEFAZOLIN SODIUM 1 G/3ML
INJECTION, POWDER, FOR SOLUTION INTRAMUSCULAR; INTRAVENOUS PRN
Status: DISCONTINUED | OUTPATIENT
Start: 2025-07-07 | End: 2025-07-07 | Stop reason: HOSPADM

## 2025-07-07 RX ORDER — MIDAZOLAM HYDROCHLORIDE 1 MG/ML
INJECTION, SOLUTION INTRAMUSCULAR; INTRAVENOUS PRN
Status: DISCONTINUED | OUTPATIENT
Start: 2025-07-07 | End: 2025-07-07 | Stop reason: HOSPADM

## 2025-07-07 RX ADMIN — ACETAMINOPHEN 650 MG: 325 TABLET ORAL at 11:14

## 2025-07-07 ASSESSMENT — PAIN SCALES - GENERAL: PAINLEVEL_OUTOF10: 3

## 2025-07-07 NOTE — PROGRESS NOTES
0827:  Patient arrived. ID and allergies verified verbally with patient. Pt voices understanding of procedure to be performed. Consent obtained. Pt prepped for procedure. Dr. Worthington notified that platelets were 131 at last blood draw.     0914:  TRANSFER - OUT REPORT:    Verbal report given to Lori Saavedra  being transferred to EP lab for ordered procedure       Report consisted of patient's Situation, Background, Assessment and   Recommendations(SBAR).     Information from the following report(s) Nurse Handoff Report was reviewed with the receiving nurse.           Lines:   Peripheral IV 07/07/25 Left;Proximal Forearm (Active)   Site Assessment Clean, dry & intact 07/07/25 1102   Line Status Blood return noted 07/07/25 1102   Phlebitis Assessment No symptoms 07/07/25 1102   Dressing Status New dressing applied;Clean, dry & intact 07/07/25 1102        Opportunity for questions and clarification was provided.      Patient transported with:  Monitor     1015:  TRANSFER - OUT REPORT:    [facility]  being transferred to EP lab for routine post-op       Report consisted of patient's Situation, Background, Assessment and   Recommendations(SBAR).     Information from the following report(s) Nurse Handoff Report was reviewed with the receiving nurse.           Lines:   Peripheral IV 07/07/25 Left;Proximal Forearm (Active)   Site Assessment Clean, dry & intact 07/07/25 1102   Line Status Blood return noted 07/07/25 1102   Phlebitis Assessment No symptoms 07/07/25 1102   Dressing Status New dressing applied;Clean, dry & intact 07/07/25 1102        Opportunity for questions and clarification was provided.      Patient transported with:  Monitor    Site check done. Dressing CDI, no hematoma noted.     1100:  Xray results checked and read.    1110:  Patient ambulates in hallway. Gait normal, no dizziness reported. Discharge instructions given to patient and family. Time given to ask

## 2025-07-07 NOTE — H&P
Office note reviewed from 25. No interim changes.                 [facility] Cardiology  Cardiac Electrophysiology Clinic Care Note                  []Initial visit     [x]Established visit     Patient Name: Fabian Saavedra - :1950 - MRN:[id number]  Primary Cardiologist: Cristiane Miller MD  Electrophysiologist: Yvonne Worthington MD     Reason for visit: Discuss pacemaker    HPI:  Mr. Saavedra is a 75 y.o. male who presents in consideration for pacemaker.    Recent exercise stress test had findings consistent with chronotropic incompetence; he only reached 50% of expected HR with exertion.    Baseline bradycardia, HR typically 40s-50s at rest.  Metoprolol & flecainide have been reduced to compensate, but does have a history of PAF with indication for ongoing rate/rhythm control.    He reports doing well, states he has some fatigue if he pushes himself, but is able to golf without difficulty.  Occasional brief left lower chest discomfort after eating, typically resolves with Tums.    Extended holter in 2025 showed HR 37-99 bpm, avg 54 bpm.  Asymptomatic PSVT noted up to 9 beats in duration.  PACs/PVCs <1%.    Echo in 2024 showed LVEF 60-65% with mild LVH, mild AS (mean grad 14 mmHg, FRANCISCO 2.2 cm2), dilated LA.    Lexiscan cardiolite stress test in 2018 showed normal LV perfusion.    BP controlled.    Anticoagulated with Xarelto, denies bleeding issues.      Previous:  PAF diagnosed in 2018.    Iron deficiency anemia, had unremarkable eval by Dr. Perez.    MAGDALENA on CPAP.    COPD, followed by pulmonary.       Assessment and Plan       ICD-10-CM    1. Sick sinus syndrome (HCC)  I49.5 CBC     Basic Metabolic Panel      2. Chronotropic incompetence  I45.89 CBC     Basic Metabolic Panel      3. PAF (paroxysmal atrial fibrillation) (HCC)  I48.0 CBC     Basic Metabolic Panel      4. Nonrheumatic aortic valve stenosis  I35.0 CBC     Basic Metabolic Panel      5. Primary hypertension  I10 CBC     Basic Metabolic

## 2025-07-07 NOTE — PROCEDURES
Pacemaker Implantation    Procedure Date: 07/07/25  Lab Physician: Yvonne Worthington MD, [facility]    INDICATIONS:  74 yo gentleman with a history of HTN, HLD, PAF, SSS with chronotropic incompetence (HR only reaching 50% of max predicted on ETT) now referred for pacemaker implantation.     COMMENTS:  After informed consent was obtained, the patient was brought to the electrophysiology laboratory in the fasting state, and was prepped and draped in the usual sterile fashion. IV antibiotic was administered prophylactically. Conscious sedation was administered by nursing staff independent of those performing the procedure under my supervision with intermittent dosing of anxiolytics and narcotics for a total sedation time of 30 mins.    Ultrasound-guided Access  Local anesthetic was delivered to the left pectoral region and an incision was made in the left deltopectoral groove. The axillary vein was accessed using a micropuncture needle with ultrasound guidance (ultrasound evaluation of possible access sites. Patency of the selected vessel.  Realtime visualization of the vascular needle entry was performed) and the vein was cannulated and a retaining wire was placed in the IVC under fluoroscopy. A 7F peel-away sheath was placed in the vein and a Medtronic lead was advanced to the RV apex under fluoroscopic guidance. Ventricular sensing and pacing thresholds were checked and were good.    A 7F peel away sheath was then placed in the vein along with the ventricular lead over the retained glide wire, and a Medtronic lead was placed in the RAA under fluoroscopic guidance. Atrial pacing and sensing thresholds were checked and were good. Pacing at 10V was performed from the ventricular lead without diaphragmatic or intercostal capture. The leads were sutured to the underlying pectoralis muscle using 0 Silk suture. Final pacing and sensing thresholds were checked and were good.     Wound Closure  A subcutaneous pocket was then

## 2025-07-08 ENCOUNTER — TELEPHONE (OUTPATIENT)
Age: 75
End: 2025-07-08

## 2025-07-08 NOTE — TELEPHONE ENCOUNTER
Verified patient with two types of identifiers. Patient states he is \"doing great\" post procedure! He has minimal to no discomfort at his incision site. Notified patient if he starts to develop any soreness he can use ice and tylenol. Verified that he has been abiding by arm restrictions. Reminded of wound and device check on 7/21/25. He states he has not received a call from [employer] [employer] but was advised that it can take 1-2 business days. Notified patient that if he has not heard from them by Thursday to please call [phone] to get his remote monitoring set up. Patient verbalized understanding and will call with any other questions.        Future Appointments   Date Time Provider Department Center   7/21/2025 11:00 AM PACEMAKER, STFRANCES [facility] AMB   10/9/2025  1:20 PM PACEMAKER, [facility]ANCES [facility] AMB   10/9/2025  1:40 PM Lacey Loving APRN - NP [facility] AMB   10/15/2025 11:00 AM Yvonne Worthington MD [facility] AMB   12/15/2025 11:20 AM Cristiane Miller MD [facility] AMB

## 2025-07-21 ENCOUNTER — PROCEDURE VISIT (OUTPATIENT)
Age: 75
End: 2025-07-21

## 2025-07-21 DIAGNOSIS — Z95.0 CARDIAC PACEMAKER IN SITU: Primary | ICD-10-CM

## 2025-07-21 NOTE — PROGRESS NOTES
Patient presents for wound check post-device implantation. The dressing was removed and the site was inspected. The site appeared to be well-healing without ecchymosis/tenderness/erythema. Denies pain, fevers, discharge.     Reviewed arm restrictions with Patient and provided Patient with handout with Instructions     Follow up in clinic as scheduled.

## 2025-07-24 ENCOUNTER — TELEPHONE (OUTPATIENT)
Age: 75
End: 2025-07-24

## 2025-07-24 NOTE — TELEPHONE ENCOUNTER
Patient would like a call back from the nurse to confirm the correct dosage for flecainide, pt stated he thought Dr. Worthington wanted him to cu the dosage in half but would like to be sure.       Pt# [phone]

## 2025-07-24 NOTE — TELEPHONE ENCOUNTER
Returned patient call, ID verified using two patient identifiers.  Notified Mr. Saavedra that he should be taking flecainide 50 mg twice daily.  He states [facility] sent him 100 mg tablets so he just wanted to confirm.    Confirmed dosage of metoprolol as that was changed at the same time as the flecainide.    Patient verbalized understanding and will call back with any other questions or concerns.    Future Appointments   Date Time Provider Department [place]   10/9/2025  1:20 PM PACEMAKER, MIKE [facility]   10/9/2025  1:40 PM Lacey Loving, APRN - NP [facility] AMB   12/15/2025 11:20 AM Cristiane Miller MD [facility] AMB

## (undated) DEVICE — INTRODUCER SHTH L13CM OD7FR SH ORNG HUB SEAMLESS SAFSHTH

## (undated) DEVICE — STRIP,CLOSURE,WOUND,MEDI-STRIP,1/2X4: Brand: MEDLINE

## (undated) DEVICE — KENDALL DL ECG DUAL CONNECT RADIOLUCENT LEAD WIRES, 5-LEAD, SINGLE PATIENT USE: Brand: KENDALL

## (undated) DEVICE — DRESSING ANTIMIC FOAM OPTIFOAM POSTOP ADH 4 X 6 IN

## (undated) DEVICE — Device

## (undated) DEVICE — SUTURE MNFLMNT SH 26 MM 1/2 CI CRCLE TPR PNT SYMTRC PD PLU

## (undated) DEVICE — SYRINGE IRRIG 60ML SFT PLIABLE BLB EZ TO GRP 1 HND USE W/

## (undated) DEVICE — 3M™ IOBAN™ 2 ANTIMICROBIAL INCISE DRAPE 6640EZ: Brand: IOBAN™ 2

## (undated) DEVICE — APPLICATOR MEDICATED 26 CC SOLUTION HI LT ORNG CHLORAPREP

## (undated) DEVICE — COVER US PRB W15XL120CM W/ GEL RUBBERBAND TAPE STRP FLD GEN

## (undated) DEVICE — SUTURE MONOCRYL + ABSORBABLE MONOFILAMENT 2-0 CT1 12 IN UD SXMP1B412

## (undated) DEVICE — SUTURE MONOCRYL STRATAFIX SPRL + SZ 4-0 L12IN ABSRB UD PS-2 SXMP1B117

## (undated) DEVICE — MEDI-TRACE CADENCE ADULT, DEFIBRILLATION ELECTRODE -RTS  (10 PR/PK) - PHYSIO-CONTROL: Brand: MEDI-TRACE CADENCE

## (undated) DEVICE — SUTURE PERMA-HAND SZ 0 L30IN NONABSORBABLE BLK L36MM CT-1 424H